# Patient Record
Sex: FEMALE | Race: WHITE | NOT HISPANIC OR LATINO | Employment: OTHER | URBAN - METROPOLITAN AREA
[De-identification: names, ages, dates, MRNs, and addresses within clinical notes are randomized per-mention and may not be internally consistent; named-entity substitution may affect disease eponyms.]

---

## 2017-03-17 DIAGNOSIS — M54.50 CHRONIC LOW BACK PAIN WITHOUT SCIATICA, UNSPECIFIED BACK PAIN LATERALITY: ICD-10-CM

## 2017-03-17 DIAGNOSIS — G89.29 CHRONIC LOW BACK PAIN WITHOUT SCIATICA, UNSPECIFIED BACK PAIN LATERALITY: ICD-10-CM

## 2017-03-17 RX ORDER — HYDROCODONE BITARTRATE AND ACETAMINOPHEN 10; 325 MG/1; MG/1
TABLET ORAL
Qty: 40 TABLET | Refills: 0 | Status: SHIPPED | OUTPATIENT
Start: 2017-03-17 | End: 2017-06-12

## 2017-03-17 NOTE — TELEPHONE ENCOUNTER
Norco 10-325mg        Last Written Prescription Date: 12/26/16  Last Fill Quantity: 40,  # refills: 0  Last Office Visit with G, P or [facility] prescribing provider: 03/10/16    Kena Mccarty Pharmacy Technician  [facility]

## 2017-03-20 ENCOUNTER — TELEPHONE (OUTPATIENT)
Dept: FAMILY MEDICINE | Facility: CLINIC | Age: 57
End: 2017-03-20

## 2017-03-20 NOTE — LETTER
[employer]  [id number] [place] [address]  [address]  Phone: [phone]  Fax: [phone]            March 20,2017                Dear ,    This is to certify that you are being evaluated and treated for some left hip discomfort. This appears to be soft tissue related, and it may be that massages will give relief. You should be able to tap into your health care savings account for therapeutic massage every 1-2 weeks as needed.            Sincerely,     Minnie Montano MD

## 2017-03-20 NOTE — TELEPHONE ENCOUNTER
Reason for Call:  Other Letter    Detailed comments: Pt asking for a letter about massage therapy and using her HSA - She wants the same note that was given to her, by Dr. Montano, 03/25/15.  Please release the letter to pt's My Chart.    Phone Number Patient can be reached at: Home number on file [phone] (home)    Best Time: any    Can we leave a detailed message on this number? YES    Call taken on 3/20/2017 at 9:40 AM by Gracie Torres

## 2017-05-04 ENCOUNTER — RADIANT APPOINTMENT (OUTPATIENT)
Dept: GENERAL RADIOLOGY | Facility: CLINIC | Age: 57
End: 2017-05-04
Attending: FAMILY MEDICINE
Payer: COMMERCIAL

## 2017-05-04 ENCOUNTER — OFFICE VISIT (OUTPATIENT)
Dept: FAMILY MEDICINE | Facility: CLINIC | Age: 57
End: 2017-05-04
Payer: COMMERCIAL

## 2017-05-04 VITALS
SYSTOLIC BLOOD PRESSURE: 126 MMHG | HEART RATE: 84 BPM | BODY MASS INDEX: 36.32 KG/M2 | DIASTOLIC BLOOD PRESSURE: 85 MMHG | WEIGHT: 230.2 LBS

## 2017-05-04 DIAGNOSIS — M25.562 ACUTE PAIN OF LEFT KNEE: ICD-10-CM

## 2017-05-04 DIAGNOSIS — E66.09 NON MORBID OBESITY DUE TO EXCESS CALORIES: ICD-10-CM

## 2017-05-04 DIAGNOSIS — L30.8 OTHER ECZEMA: Primary | ICD-10-CM

## 2017-05-04 DIAGNOSIS — N39.46 MIXED STRESS AND URGE URINARY INCONTINENCE: ICD-10-CM

## 2017-05-04 PROCEDURE — 73565 X-RAY EXAM OF KNEES: CPT

## 2017-05-04 PROCEDURE — 99214 OFFICE O/P EST MOD 30 MIN: CPT | Performed by: FAMILY MEDICINE

## 2017-05-04 RX ORDER — PHENTERMINE HYDROCHLORIDE 37.5 MG/1
CAPSULE ORAL
Qty: 30 CAPSULE | Refills: 0 | Status: SHIPPED | OUTPATIENT
Start: 2017-05-04 | End: 2017-08-10

## 2017-05-04 RX ORDER — TOLTERODINE 2 MG/1
2 CAPSULE, EXTENDED RELEASE ORAL DAILY
Qty: 30 CAPSULE | Refills: 11 | Status: SHIPPED | OUTPATIENT
Start: 2017-05-04 | End: 2018-08-20

## 2017-05-04 NOTE — PROGRESS NOTES
"  SUBJECTIVE:                                                    Joana Arredondo is a 56 year old female who presents to clinic today for the following health issues:    Chief Complaint   Patient presents with     Knee Pain     left knee pain for the last couple months. Worse with activity. swelling on the top of knee. pain is localized in the front of her knee.      Mole     back (left side) not painful.      Derm Problem     discuss cream for excema, refill Kenalog cream.      Recheck Medication     refill meds     Weight Loss     discuss weight loss medications. She has tried Phentermine in the past but would like to discuss more options.      Joana Arredondo is a 56 year old female who has multiple concerns as noted above.  She has some old triamcinolone cream which she has used for some recurrent elbow dermatitis.  She is not finding it as effective as it was several years ago, and does not think that is due to the age of the cream. She uses it intermittently, would like to try something a bit stronger.    She has noted some periodic left knee discomfort, especially after walking long distances, sometimes notes some swelling on the anterior superior portion of the knee, sometimes feels fullness in the popliteal space. She has not noted redness or warmth, has not had any trauma, is not having pain at the present time. She denies locking or buckling. She has increased her activity as part of her weight loss efforts, has even ordered a FitBit.    She has lost weight in the past with [employer] and is restarting the weekly meetings, but would like something to \"jumpstart' her progress.  She has used phentermine in the past with good results, knows this cannot be used longterm, would like to discuss other medical treatment options.    She needs a refill of her Detrol LA.    OBJECTIVE: /85 (BP Location: Right arm, Patient Position: Chair, Cuff Size: Adult Regular)  Pulse 84  Wt 230 lb 3.2 oz " (104.4 kg)  LMP 03/06/2011  BMI 36.32 kg/m2    The lesion on her back is a 4-5 mm rough speckled tan lesion consistent with a small seborrheic keratosis. Patient was reassured.    Knees are grossly symmetric, no effusion or inflammation, normal range of motion.  Xray:  FINDINGS: No fracture or osseous lesion is seen. There is mild joint  space loss in the medial compartment of the left knee and of the  lateral compartment of the right knee. No other abnormality is  Demonstrated.    Examination of the elbows continues to show rough red lesions over each olecranon. On one arm, she also has a 3x2 cm slightly raised red smooth lesion on the distal extensor surface of the forearm with distinct borders suggestive by size and location of a possible psoriatic lesion, but there is no scaliness noted at this time. She has no other similar lesions.    ASSESSMENT:/PLAN:  1)seborrheic keratosis -- asymptomatic, no treatment needed  2) mild osteoarthritis left knee -- agreed with plan to work on weight loss. Continue regular exercise but avoid high impact exercise. OK for occasional Tylenol if needed.  3) eczematoid lesions on elbow with newer plaque suggestive but not diagnostic of possible psoriatic lesion -- Rx Lidex-E BID as needed  4) We discussed phentermine, Orlistat, Belviq, Topamax, Contrave, and Qsymia. We did not discuss metformin or Victoza. At this time, since she did well on phentermine in the past --- and did very well many years ago on the old Phen-Fen combination -- and since phentermine is the least expensive, she would like to try this again for 2-4 months while she gets started on the [employer] again.  Rx for phentermine 18.75 mg daily for two months, then reassess.  5) mixed stress and urge incontinence -- Detrol LA renewed for one year.    Minnie Montano md

## 2017-05-04 NOTE — NURSING NOTE
"Chief Complaint   Patient presents with     Knee Pain     left knee pain for the last couple months. Worse with activity. swelling on the top of knee. pain is localized in the front of her knee.      Mole     back (left side) not painful.      Derm Problem     discuss cream for excema, refill Kenalog cream.      Recheck Medication     refill meds     Weight Loss     discuss weight loss medications. She has tried Phentermine in the past but would like to discuss more options.        Initial /85 (BP Location: Right arm, Patient Position: Chair, Cuff Size: Adult Regular)  Pulse 84  Wt 230 lb 3.2 oz (104.4 kg)  LMP 03/06/2011  BMI 36.32 kg/m2 Estimated body mass index is 36.32 kg/(m^2) as calculated from the following:    Height as of 3/10/16: 5' 6.75\" (1.695 m).    Weight as of this encounter: 230 lb 3.2 oz (104.4 kg).  Medication Reconciliation: complete   Indu Chaparro CMA    "

## 2017-05-04 NOTE — MR AVS SNAPSHOT
After Visit Summary   5/4/2017    Joana Arredondo    MRN: [id number]           Patient Information     Date Of Birth          1960        Visit Information        Provider Department      5/4/2017 4:00 PM Minnie Montano MD [facility]        Today's Diagnoses     Other eczema    -  1    Mixed stress and urge urinary incontinence        Acute pain of left knee        Non morbid obesity due to excess calories           Follow-ups after your visit        Who to contact     If you have questions or need follow up information about today's clinic visit or your schedule please contact [facility] directly at [phone].  Normal or non-critical lab and imaging results will be communicated to you by Reviews42hart, letter or phone within 4 business days after the clinic has received the results. If you do not hear from us within 7 days, please contact the clinic through Reviews42hart or phone. If you have a critical or abnormal lab result, we will notify you by phone as soon as possible.  Submit refill requests through DataFlyte or call your pharmacy and they will forward the refill request to us. Please allow 3 business days for your refill to be completed.          Additional Information About Your Visit        MyChart Information     DataFlyte gives you secure access to your electronic health record. If you see a primary care provider, you can also send messages to your care team and make appointments. If you have questions, please call your primary care clinic.  If you do not have a primary care provider, please call [phone] and they will assist you.        Care EveryWhere ID     This is your Care EveryWhere ID. This could be used by other organizations to access your [place] medical records  [id number]        Your Vitals Were     Pulse Last Period BMI (Body Mass Index)             84 03/06/2011 36.32 kg/m2          Blood Pressure from Last 3 Encounters:   05/04/17  126/85   03/10/16 123/81   01/22/16 122/70    Weight from Last 3 Encounters:   05/04/17 230 lb 3.2 oz (104.4 kg)   03/10/16 231 lb (104.8 kg)   01/22/16 231 lb (104.8 kg)                 Today's Medication Changes          These changes are accurate as of: 5/4/17  5:48 PM.  If you have any questions, ask your nurse or doctor.               Start taking these medicines.        Dose/Directions    fluocinonide-emollient 0.05 % cream   Commonly known as:  LIDEX-E   Used for:  Other eczema   Replaces:  triamcinolone 0.1 % cream   Started by:  Minnie Montano MD        Apply to affected area of elbows twice daily as needed.   Quantity:  30 g   Refills:  3       phentermine 37.5 MG capsule   Used for:  Non morbid obesity due to excess calories   Started by:  Minnie Montano MD        Take 1/2 tablet daily in the AM.   Quantity:  30 capsule   Refills:  0         Stop taking these medicines if you haven't already. Please contact your care team if you have questions.     triamcinolone 0.1 % cream   Commonly known as:  KENALOG   Replaced by:  fluocinonide-emollient 0.05 % cream   Stopped by:  Minnie Montano MD                Where to get your medicines      These medications were sent to [facility] [address]  [address] [facility] [address]     Phone:  [phone]     fluocinonide-emollient 0.05 % cream    tolterodine 2 MG 24 hr capsule         Some of these will need a paper prescription and others can be bought over the counter.  Ask your nurse if you have questions.     Bring a paper prescription for each of these medications     phentermine 37.5 MG capsule                Primary Care Provider Office Phone # Fax #    Minnie Montano [phone]       [facility] [address] [facility] [address]        Thank you!     Thank you for choosing [facility]  for your care. Our goal is  always to provide you with excellent care. Hearing back from our patients is one way we can continue to improve our services. Please take a few minutes to complete the written survey that you may receive in the mail after your visit with us. Thank you!             Your Updated Medication List - Protect others around you: Learn how to safely use, store and throw away your medicines at www.disposemymeds.org.          This list is accurate as of: 5/4/17  5:48 PM.  Always use your most recent med list.                   Brand Name Dispense Instructions for use    acyclovir 5 % cream    ZOVIRAX    5 g    Apply  topically. Apply to affected area thinly five times daily at the first sign of symptoms.       CALCIUM + D PO      1 TABLET DAILY       fluocinonide-emollient 0.05 % cream    LIDEX-E    30 g    Apply to affected area of elbows twice daily as needed.       HYDROcodone-acetaminophen  MG per tablet    NORCO    40 tablet    Take 1/2 - 1 tablet at night as needed for back pain.       LORazepam 0.5 MG tablet    ATIVAN    20 tablet    Take 1-2 tablets (0.5-1 mg) by mouth every 8 hours as needed for anxiety       MULTIVITAMINS PO      1 daily       phentermine 37.5 MG capsule     30 capsule    Take 1/2 tablet daily in the AM.       tolterodine 2 MG 24 hr capsule    DETROL LA    30 capsule    Take 1 capsule (2 mg) by mouth daily       TYLENOL ARTHRITIS PAIN 650 MG CR tablet   Generic drug:  acetaminophen      Take 650 mg by mouth daily. 2 daily

## 2017-05-05 ASSESSMENT — ANXIETY QUESTIONNAIRES
GAD7 TOTAL SCORE: 0
7. FEELING AFRAID AS IF SOMETHING AWFUL MIGHT HAPPEN: NOT AT ALL
2. NOT BEING ABLE TO STOP OR CONTROL WORRYING: NOT AT ALL
IF YOU CHECKED OFF ANY PROBLEMS ON THIS QUESTIONNAIRE, HOW DIFFICULT HAVE THESE PROBLEMS MADE IT FOR YOU TO DO YOUR WORK, TAKE CARE OF THINGS AT HOME, OR GET ALONG WITH OTHER PEOPLE: NOT DIFFICULT AT ALL
1. FEELING NERVOUS, ANXIOUS, OR ON EDGE: NOT AT ALL
6. BECOMING EASILY ANNOYED OR IRRITABLE: NOT AT ALL
3. WORRYING TOO MUCH ABOUT DIFFERENT THINGS: NOT AT ALL
5. BEING SO RESTLESS THAT IT IS HARD TO SIT STILL: NOT AT ALL

## 2017-05-05 ASSESSMENT — PATIENT HEALTH QUESTIONNAIRE - PHQ9: 5. POOR APPETITE OR OVEREATING: NOT AT ALL

## 2017-05-06 ASSESSMENT — PATIENT HEALTH QUESTIONNAIRE - PHQ9: SUM OF ALL RESPONSES TO PHQ QUESTIONS 1-9: 3

## 2017-05-06 ASSESSMENT — ANXIETY QUESTIONNAIRES: GAD7 TOTAL SCORE: 0

## 2017-06-12 DIAGNOSIS — M54.50 CHRONIC LOW BACK PAIN WITHOUT SCIATICA, UNSPECIFIED BACK PAIN LATERALITY: ICD-10-CM

## 2017-06-12 DIAGNOSIS — G89.29 CHRONIC LOW BACK PAIN WITHOUT SCIATICA, UNSPECIFIED BACK PAIN LATERALITY: ICD-10-CM

## 2017-06-12 RX ORDER — HYDROCODONE BITARTRATE AND ACETAMINOPHEN 10; 325 MG/1; MG/1
TABLET ORAL
Qty: 40 TABLET | Refills: 0 | Status: SHIPPED | OUTPATIENT
Start: 2017-06-12 | End: 2017-08-10

## 2017-06-12 NOTE — TELEPHONE ENCOUNTER
Norco 10-325mg      Last Written Prescription Date:  3/17/17  Last Fill Quantity: 40,   # refills: 0  Last Office Visit with [facility], [facility] or [facility] prescribing provider: 5/4/17  Future Office visit:       Routing refill request to provider for review/approval because:  Drug not on the [facility], [facility] or [facility] refill protocol or controlled substance    Thank You-  Olga Vergara, [facility] [place]

## 2017-08-10 DIAGNOSIS — F41.9 ANXIETY: ICD-10-CM

## 2017-08-10 DIAGNOSIS — E66.09 NON MORBID OBESITY DUE TO EXCESS CALORIES: ICD-10-CM

## 2017-08-10 DIAGNOSIS — M54.50 CHRONIC LOW BACK PAIN WITHOUT SCIATICA, UNSPECIFIED BACK PAIN LATERALITY: ICD-10-CM

## 2017-08-10 DIAGNOSIS — G89.29 CHRONIC LOW BACK PAIN WITHOUT SCIATICA, UNSPECIFIED BACK PAIN LATERALITY: ICD-10-CM

## 2017-08-10 RX ORDER — PHENTERMINE HYDROCHLORIDE 37.5 MG/1
CAPSULE ORAL
Qty: 30 CAPSULE | Refills: 0 | Status: SHIPPED | OUTPATIENT
Start: 2017-08-10 | End: 2018-03-07

## 2017-08-10 RX ORDER — LORAZEPAM 0.5 MG/1
.5-1 TABLET ORAL EVERY 8 HOURS PRN
Qty: 20 TABLET | Refills: 1 | Status: SHIPPED | OUTPATIENT
Start: 2017-08-10 | End: 2018-09-14

## 2017-08-10 RX ORDER — HYDROCODONE BITARTRATE AND ACETAMINOPHEN 10; 325 MG/1; MG/1
TABLET ORAL
Qty: 40 TABLET | Refills: 0 | Status: SHIPPED | OUTPATIENT
Start: 2017-08-10 | End: 2018-01-02

## 2017-08-10 NOTE — TELEPHONE ENCOUNTER
Norco   Last Written Prescription Date: 06/12/17  Last Fill Quantity: 40,  # refills: 0   Last Office Visit with [facility], [facility] or [facility] prescribing provider: 05/04/17    Phentermine     Last Written Prescription Date: 05/04  Last Fill Quantity:30 ,  # refills: 0  Last Office Visit with [facility], [facility] or [facility] prescribing provider: 05/04/17    Lorazepam    Last Written Prescription Date: 12/26/16  Last Fill Quantity: 20,  # refills: 1   Last Office Visit with [facility], [facility] or [facility] prescribing provider: 05/04/17    [facility]  [phone]

## 2018-01-02 ENCOUNTER — OFFICE VISIT (OUTPATIENT)
Dept: FAMILY MEDICINE | Facility: CLINIC | Age: 58
End: 2018-01-02
Payer: COMMERCIAL

## 2018-01-02 VITALS
WEIGHT: 234 LBS | RESPIRATION RATE: 18 BRPM | SYSTOLIC BLOOD PRESSURE: 116 MMHG | BODY MASS INDEX: 36.92 KG/M2 | DIASTOLIC BLOOD PRESSURE: 78 MMHG | TEMPERATURE: 98.3 F | HEART RATE: 87 BPM

## 2018-01-02 DIAGNOSIS — M54.50 CHRONIC LOW BACK PAIN WITHOUT SCIATICA, UNSPECIFIED BACK PAIN LATERALITY: ICD-10-CM

## 2018-01-02 DIAGNOSIS — G89.29 CHRONIC LOW BACK PAIN WITHOUT SCIATICA, UNSPECIFIED BACK PAIN LATERALITY: ICD-10-CM

## 2018-01-02 DIAGNOSIS — L23.9 ALLERGIC CONTACT DERMATITIS, UNSPECIFIED TRIGGER: Primary | ICD-10-CM

## 2018-01-02 PROCEDURE — 99213 OFFICE O/P EST LOW 20 MIN: CPT | Performed by: FAMILY MEDICINE

## 2018-01-02 RX ORDER — HYDROCODONE BITARTRATE AND ACETAMINOPHEN 10; 325 MG/1; MG/1
TABLET ORAL
Qty: 40 TABLET | Refills: 0 | Status: SHIPPED | OUTPATIENT
Start: 2018-01-02 | End: 2018-04-12

## 2018-01-02 ASSESSMENT — PATIENT HEALTH QUESTIONNAIRE - PHQ9: SUM OF ALL RESPONSES TO PHQ QUESTIONS 1-9: 0

## 2018-01-02 NOTE — MR AVS SNAPSHOT
After Visit Summary   1/2/2018    Joana Arredondo    MRN: [id number]           Patient Information     Date Of Birth          1960        Visit Information        Provider Department      1/2/2018 2:00 PM Conor Escoto MD [facility]        Today's Diagnoses     Allergic contact dermatitis, unspecified trigger    -  1    Chronic low back pain without sciatica, unspecified back pain laterality          Care Instructions          Thank you for choosing [facility].  You may be receiving a survey in the mail from [facility] your visit today.  Please take a few minutes to complete and return the survey to let us know how we are doing.      Our Clinic hours are:  Mondays    7:20 am - 7 pm  Tues -  Fri  7:20 am - 5 pm    Clinic Phone: [phone]    The clinic lab opens at 7:30 am Mon - Fri and appointments are required.    [facility]. [phone]  Monday-Thursday 8 am - 7pm  Tues/Wed/Fri 8 am - 5:30 pm                 Follow-ups after your visit        Your next 10 appointments already scheduled     Jan 08, 2018 10:15 AM CST   New Visit [facility]as MD   [facility]    [address] [facility] [address] [phone]              Who to contact     If you have questions or need follow up information about today's clinic visit or your schedule please contact [facility] directly at [phone].  Normal or non-critical lab and imaging results will be communicated to you by MyChart, letter or phone within 4 business days after the clinic has received the results. If you do not hear from us within 7 days, please contact the clinic through MyChart or phone. If you have a critical or abnormal lab result, we will notify you by phone as soon as possible.  Submit refill requests through Siemens or call your pharmacy and they will forward the refill request to  us. Please allow 3 business days for your refill to be completed.          Additional Information About Your Visit        MyChart Information     SoundTag gives you secure access to your electronic health record. If you see a primary care provider, you can also send messages to your care team and make appointments. If you have questions, please call your primary care clinic.  If you do not have a primary care provider, please call [phone] and they will assist you.        Care EveryWhere ID     This is your Care EveryWhere ID. This could be used by other organizations to access your [place] medical records  [id number]        Your Vitals Were     Pulse Temperature Respirations Last Period BMI (Body Mass Index)       87 98.3  F (36.8  C) 18 03/06/2011 36.92 kg/m2        Blood Pressure from Last 3 Encounters:   01/02/18 116/78   05/04/17 126/85   03/10/16 123/81    Weight from Last 3 Encounters:   01/02/18 234 lb (106.1 kg)   05/04/17 230 lb 3.2 oz (104.4 kg)   03/10/16 231 lb (104.8 kg)              We Performed the Following     DEPRESSION ACTION PLAN (DAP)          Where to get your medicines      Some of these will need a paper prescription and others can be bought over the counter.  Ask your nurse if you have questions.     Bring a paper prescription for each of these medications     HYDROcodone-acetaminophen  MG per tablet          Primary Care Provider Fax #    Physician No Ref-Primary [phone]       No address on file        Equal Access to Services     NIRAJ WAYNE : Hadii demi pererao Sonika, waaxda luqadaha, qaybta kaalmada adeegyada, samuel kamara . So [facility] [phone].    ATENCIÓN: Si habla español, tiene a flanagan disposición servicios gratuitos de asistencia lingüística. Llame al [phone].    We comply with applicable federal civil rights laws and [place] laws. We do not discriminate on the basis of race, color, national origin, age, disability, sex,  sexual orientation, or gender identity.            Thank you!     Thank you for choosing [facility]  for your care. Our goal is always to provide you with excellent care. Hearing back from our patients is one way we can continue to improve our services. Please take a few minutes to complete the written survey that you may receive in the mail after your visit with us. Thank you!             Your Updated Medication List - Protect others around you: Learn how to safely use, store and throw away your medicines at www.disposemymeds.org.          This list is accurate as of: 1/2/18  2:21 PM.  Always use your most recent med list.                   Brand Name Dispense Instructions for use Diagnosis    acyclovir 5 % cream    ZOVIRAX    5 g    Apply  topically. Apply to affected area thinly five times daily at the first sign of symptoms.    Herpes simplex without mention of complication       CALCIUM + D PO      1 TABLET DAILY        fluocinonide-emollient 0.05 % cream    LIDEX-E    30 g    Apply to affected area of elbows twice daily as needed.    Other eczema       FLUVIRIN Susp   Generic drug:  Influenza Vac Typ A&B Surf Ant      ADM 0.5ML IM UTD        HYDROcodone-acetaminophen  MG per tablet    NORCO    40 tablet    Take 1/2 - 1 tablet at night as needed for back pain.    Chronic low back pain without sciatica, unspecified back pain laterality       LORazepam 0.5 MG tablet    ATIVAN    20 tablet    Take 1-2 tablets (0.5-1 mg) by mouth every 8 hours as needed for anxiety    Anxiety       MULTIVITAMINS PO      1 daily        phentermine 37.5 MG capsule     30 capsule    Take 1/2 tablet daily in the AM.    Non morbid obesity due to excess calories       tolterodine 2 MG 24 hr capsule    DETROL LA    30 capsule    Take 1 capsule (2 mg) by mouth daily    Mixed stress and urge urinary incontinence       TYLENOL ARTHRITIS PAIN 650 MG CR tablet   Generic drug:  acetaminophen      Take 650 mg by mouth  daily. 2 daily    Chronic low back pain

## 2018-01-02 NOTE — PROGRESS NOTES
SUBJECTIVE:   Joana Arredondo is a 57 year old female who presents to clinic today for the following health issues:    Chief Complaint   Patient presents with     Derm Problem     rash on both eye lids      Letter Request     Recheck Medication     refill pain medication          Rash  Onset: x 6 days     Description:   Location:  Both  eye lids   Character: red  Itching (Pruritis): YES    Progression of Symptoms:  worsening    Accompanying Signs & Symptoms:  Fever: no   Body aches or joint pain: no   Sore throat symptoms: no   Recent cold symptoms: no     History:   Previous similar rash: no     Precipitating factors:   Exposure to similar rash: no   New exposures: None   Recent travel: no     Alleviating factors:  none    Therapies Tried and outcome: OTC creams - no relief           Problem list and histories reviewed & adjusted, as indicated.  Additional history: as documented        Reviewed and updated as needed this visit by clinical staffTobacco  Meds       Reviewed and updated as needed this visit by Provider      Further history obtained, clarified or corrected by physician:  She has had a few days of itchy rash on the upper eyelids.  She also apparently is a long-term but very intermittent Vicodin use her for chronic low back pain and she wants a refill.    OBJECTIVE:  /78  Pulse 87  Temp 98.3  F (36.8  C)  Resp 18  Wt 234 lb (106.1 kg)  LMP 03/06/2011  BMI 36.92 kg/m2  LUNGS: clear to auscultation, normal breath sounds  CV: RRR without murmur  ABD: BS+, soft, nontender, no masses, no hepatosplenomegaly  EXTREMITIES: without joint tenderness, swelling or erythema.  No muscle tenderness or abnormality.  SKIN: No rashes or abnormalities, except for just slight redness and scaling of the upper lids  NEURO:non focal exam    ASSESSMENT:     Chronic low back pain without sciatica, unspecified back pain laterality  Allergic contact dermatitis, unspecified trigger    PLAN:  Over-the-counter  hydrocortisone cream twice daily as needed for the next week or so  She has an appointment with a dermatologist next week so she can follow-up on the rash at that time.    Orders Placed This Encounter     DEPRESSION ACTION PLAN (DAP)     HYDROcodone-acetaminophen (NORCO)  MG per tablet     FLUVIRIN SUSP

## 2018-01-02 NOTE — PATIENT INSTRUCTIONS
Thank you for choosing [facility].  You may be receiving a survey in the mail from [facility] regarding your visit today.  Please take a few minutes to complete and return the survey to let us know how we are doing.      Our Clinic hours are:  Mondays    7:20 am - 7 pm  Tues -  Fri  7:20 am - 5 pm    Clinic Phone: [phone]    The clinic lab opens at 7:30 am Mon - Fri and appointments are required.    [facility]. [phone]  Monday-Thursday 8 am - 7pm  Tues/Wed/Fri 8 am - 5:30 pm

## 2018-01-02 NOTE — LETTER
[facility]  [address] [place] [address]  [address]  Phone: [phone]  Fax: [phone]            Joana Arredondo  [address] [address]  Phone: [phone]            Joana Arredondo,      Dear ,     This is to certify that you are being evaluated and treated for some left hip discomfort. This appears to be soft tissue related, and it may be that massages will give relief. You should be able to tap into your health care savings account for therapeutic massage every 1-2 weeks as needed.            Dr. Escoto

## 2018-01-08 ENCOUNTER — OFFICE VISIT (OUTPATIENT)
Dept: DERMATOLOGY | Facility: CLINIC | Age: 58
End: 2018-01-08
Payer: COMMERCIAL

## 2018-01-08 ENCOUNTER — TELEPHONE (OUTPATIENT)
Dept: DERMATOLOGY | Facility: CLINIC | Age: 58
End: 2018-01-08

## 2018-01-08 VITALS — HEART RATE: 82 BPM | SYSTOLIC BLOOD PRESSURE: 115 MMHG | DIASTOLIC BLOOD PRESSURE: 70 MMHG | OXYGEN SATURATION: 99 %

## 2018-01-08 DIAGNOSIS — L82.1 SK (SEBORRHEIC KERATOSIS): ICD-10-CM

## 2018-01-08 DIAGNOSIS — C44.311 BASAL CELL CARCINOMA OF NOSE: Primary | ICD-10-CM

## 2018-01-08 DIAGNOSIS — L81.4 LENTIGO: ICD-10-CM

## 2018-01-08 PROCEDURE — 99203 OFFICE O/P NEW LOW 30 MIN: CPT | Mod: 25 | Performed by: DERMATOLOGY

## 2018-01-08 PROCEDURE — 88331 PATH CONSLTJ SURG 1 BLK 1SPC: CPT | Performed by: DERMATOLOGY

## 2018-01-08 PROCEDURE — 11100 HC BIOPSY SKIN/SUBQ/MUC MEM, SINGLE LESION: CPT | Performed by: DERMATOLOGY

## 2018-01-08 NOTE — LETTER
1/8/2018         RE: Joana Arredondo  [address] [place] [address]        Dear Colleague,    Thank you for referring your patient, Joana Arredondo, to the [facility]. Please see a copy of my visit note below.    Joana Arredondo is a 57 year old year old female patient here today for bleeding spot on nose .  Patient states this has been present for 3 months.  Patient reports the following symptoms:  bleeding.  Patient reports the following previous treatments none.  Patient reports the following modifying factors none.  Associated symptoms: none.  Patient has no other skin complaints today.  Remainder of the HPI, Meds, PMH, Allergies, FH, and SH was reviewed in chart.      Past Medical History:   Diagnosis Date     Neoplasm of uncertain behavior of skin 3/13/2012     Tobacco use disorder 11/30/2005    Quit October 2007       Past Surgical History:   Procedure Laterality Date     COLONOSCOPY  10/22/2012    Procedure: COLONOSCOPY;  Colonoscopy;  Surgeon: Robert Encinas MD;  Location: [facility]     ENT SURGERY       LAPAROSCOPIC CHOLECYSTECTOMY  10/21/2010    LAPAROSCOPIC CHOLECYSTECTOMY performed by DAMIR WING at [facility]     SURGICAL HISTORY OF -   1973    Rt wrist surgery     SURGICAL HISTORY OF -   2004    breast augmentation     SURGICAL HISTORY OF -   10/15/2010    EUS - GB bulding w/sludge, Nl bile duct w/no choledocholithiasis or sludge.      SURGICAL HISTORY OF -   2/9/2006    1.  Left shoulder arthroscopy with arthroscopic subacromial decompression.      TONSILLECTOMY & ADENOIDECTOMY  as child    tonsils and adenoids        Family History   Problem Relation Age of Onset     DIABETES Father      type 2     C.A.D. Father      HEART DISEASE Father      had 2 heart attacks,68 for first one 79 on second     Cancer - colorectal Paternal Grandfather      DIABETES Sister      type 2       Social History     Social History     Marital status:      Spouse name: N/A      Number of children: N/A     Years of education: N/A     Occupational History     Not on file.     Social History Main Topics     Smoking status: Current Every Day Smoker     Packs/day: 0.50     Years: 20.00     Types: Cigarettes     Smokeless tobacco: Never Used      Comment: using E-cig     Alcohol use No     Drug use: No     Sexual activity: Yes     Partners: Male      Comment: husbandvasectomy     Other Topics Concern     Parent/Sibling W/ Cabg, Mi Or Angioplasty Before 65f 55m? No     Social History Narrative       Outpatient Encounter Prescriptions as of 1/8/2018   Medication Sig Dispense Refill     HYDROcodone-acetaminophen (NORCO)  MG per tablet Take 1/2 - 1 tablet at night as needed for back pain. 40 tablet 0     FLUVIRIN SUSP ADM 0.5ML IM UTD  0     phentermine 37.5 MG capsule Take 1/2 tablet daily in the AM. 30 capsule 0     LORazepam (ATIVAN) 0.5 MG tablet Take 1-2 tablets (0.5-1 mg) by mouth every 8 hours as needed for anxiety 20 tablet 1     fluocinonide-emollient (LIDEX-E) 0.05 % cream Apply to affected area of elbows twice daily as needed. 30 g 3     tolterodine (DETROL LA) 2 MG 24 hr capsule Take 1 capsule (2 mg) by mouth daily 30 capsule 11     acetaminophen (TYLENOL ARTHRITIS PAIN) 650 MG CR tablet Take 650 mg by mouth daily. 2 daily       acyclovir (ZOVIRAX) 5 % cream Apply  topically. Apply to affected area thinly five times daily at the first sign of symptoms. 5 g 2     MULTIVITAMINS OR 1 daily        CALCIUM + D OR 1 TABLET DAILY       No facility-administered encounter medications on file as of 1/8/2018.              Review Of Systems  Skin: As above  Eyes: negative  Ears/Nose/Throat: negative  Respiratory: No shortness of breath, dyspnea on exertion, cough, or hemoptysis  Cardiovascular: negative  Gastrointestinal: negative  Genitourinary: negative  Musculoskeletal: negative  Neurologic: negative  Psychiatric: negative  Hematologic/Lymphatic/Immunologic: negative  Endocrine:  negative      O:   NAD, WDWN, Alert & Oriented, Mood & Affect wnl, Vitals stable   Here today alone   /70 (BP Location: Left arm, Patient Position: Sitting, Cuff Size: Adult Large)  Pulse 82  LMP 03/06/2011  SpO2 99%   General appearance normal   Vitals stable   Alert, oriented and in no acute distress      Following lymph nodes palpated: Occipital, Cervical, Supraclavicular no lad   Stuck on papules and brown macules on trunk and ext    L nasal tip 5mm pink pearly papule           The remainder of expanded problem focused exam was unremarkable; the following areas were examined:  scalp/hair, conjunctiva/lids, face, neck, lips, chest, digits/nails, RUE, LUE.      Eyes: Conjunctivae/lids:Normal     ENT: Lips, buccal mucosa, tongue: normal    MSK:Normal    Cardiovascular: peripheral edema none    Pulm: Breathing Normal    Lymph Nodes: No Head and Neck Lymphadenopathy     Neuro/Psych: Orientation:Normal; Mood/Affect:Normal    MICRO  L nasal tip:Orthokeratosis of epidermis with a proliferation of nests of basaloid cells, with peripheral palisading and a haphazard arrangement in the center extending into the dermis, forming nodules.  The tumor cells have hyperchromatic nuclei. Poor cytoplasm and intercellular bridging.    A/P:  1. L nasal tip r/o basal cell carcinoma   TANGENTIAL BIOPSY IN HOUSE:  After consent, anesthesia with LEC and prep, tangential excision performed and dx above confirmed with frozen section histology.  No complications and routine wound care.  Patient told result basal cell carcinoma schedule  2. Seborrheic keratosis, lentigo  .    BENIGN LESIONS DISCUSSED WITH PATIENT:  I discussed the specifics of tumor, prognosis, and genetics of benign lesions.  I explained that treatment of these lesions would be purely cosmetic and not medically neccessary.  I discussed with patient different removal options including excision, cautery and /or laser.      Nature and genetics of benign skin lesions  dicussed with patient.  Signs and Symptoms of skin cancer discussed with patient.  Patient encouraged to perform monthly skin exams.  UV precautions reviewed with patient.  Skin care regimen reviewed with patient: Eliminate harsh soaps, i.e. Dial, zest, irsih spring; Mild soaps such as Cetaphil or Dove sensitive skin, avoid hot or cold showers, aggressive use of emollients including vanicream, cetaphil or cerave discussed with patient.    Risks of non-melanoma skin cancer discussed with patient   Return to clinic 6 months        Again, thank you for allowing me to participate in the care of your patient.        Sincerely,        Tato Dias MD

## 2018-01-08 NOTE — LETTER
[facility]  [address]  [facility] [address]  Phone: [phone]    January 8, 2018    Joana Maria Elena                                                                                                                [address] [place] [address]            Dear Ms. Arredondo,    You are scheduled for Mohs Surgery on Monday January 22 nd at 7:30 am.     Please check in at Dermatology Clinic.   (2nd Floor, last  Clinic on right up staircase or elevator -past OB/GYN clinic)    You don't need to arrive more than 5-10 minutes prior to your appointment time.     Be sure to eat a good breakfast and bathe and wash your hair prior to Surgery.    If you are taking any anti-coagulants that are prescribed by your Doctor (such as Coumadin/warfarin, Plavix, Aspirin, Ibuprofen), please continue taking them.     However, If you are taking anti-coagulants over the counter without  a Doctor's order for a Medical condition, please discontinue them 10 days prior to Surgery.      Please wear loose comfortable clothing as it could possibly be 4-6 hours until your surgery is completed depending upon how many layers of tissue need to be removed.     Wi-fi access is available.     Thank you,      Tato Dias MD/ Virgie Peguero RN

## 2018-01-08 NOTE — TELEPHONE ENCOUNTER
Patient notified. Patient verbalized understanding. Scheduled for MOHS Surgery. Mohs brochure/Pre-op letter sent.   Virgie Peguero RN

## 2018-01-08 NOTE — NURSING NOTE
"Initial /70 (BP Location: Left arm, Patient Position: Sitting, Cuff Size: Adult Large)  Pulse 82  LMP 03/06/2011  SpO2 99% Estimated body mass index is 36.92 kg/(m^2) as calculated from the following:    Height as of 3/10/16: 1.695 m (5' 6.75\").    Weight as of 1/2/18: 106.1 kg (234 lb). .      "
Statement Selected

## 2018-01-08 NOTE — MR AVS SNAPSHOT
After Visit Summary   1/8/2018    Joana Arredondo    MRN: [id number]           Patient Information     Date Of Birth          1960        Visit Information        Provider Department      1/8/2018 10:15 AM Tato Dias MD [facility]        Care Instructions          Wound Care Instructions     FOR SUPERFICIAL WOUNDS     [facility] [phone]    [facility][1] [phone]      nose                 AFTER 24 HOURS YOU SHOULD REMOVE THE BANDAGE AND BEGIN DAILY DRESSING CHANGES AS FOLLOWS:     1) Remove Dressing.     2) Clean and dry the area with tap water using a Q-tip or sterile gauze pad.     3) Apply Vaseline, Aquaphor, Polysporin ointment or Bacitracin ointment over entire wound.  Do NOT use Neosporin ointment.     4) Cover the wound with a band-aid, or a sterile non-stick gauze pad and micropore paper tape      REPEAT THESE INSTRUCTIONS AT LEAST ONCE A DAY UNTIL THE WOUND HAS COMPLETELY HEALED.    It is an old wives tale that a wound heals better when it is exposed to air and allowed to dry out. The wound will heal faster with a better cosmetic result if it is kept moist with ointment and covered with a bandage.    **Do not let the wound dry out.**      Supplies Needed:      *Cotton tipped applicators (Q-tips)    *Polysporin Ointment or Bacitracin Ointment (NOT NEOSPORIN)    *Band-aids or non-stick gauze pads and micropore paper tape.      PATIENT INFORMATION:    During the healing process you will notice a number of changes. All wounds develop a small halo of redness surrounding the wound.  This means healing is occurring. Severe itching with extensive redness usually indicates sensitivity to the ointment or bandage tape used to dress the wound.  You should call our office if this develops.      Swelling  and/or discoloration around your surgical site is common, particularly when performed around the eye.    All wounds normally drain.  The  larger the wound the more drainage there will be.  After 7-10 days, you will notice the wound beginning to shrink and new skin will begin to grow.  The wound is healed when you can see skin has formed over the entire area.  A healed wound has a healthy, shiny look to the surface and is red to dark pink in color to normalize.  Wounds may take approximately 4-6 weeks to heal.  Larger wounds may take 6-8 weeks.  After the wound is healed you may discontinue dressing changes.    You may experience a sensation of tightness as your wound heals. This is normal and will gradually subside.    Your healed wound may be sensitive to temperature changes. This sensitivity improves with time, but if you re having a lot of discomfort, try to avoid temperature extremes.    Patients frequently experience itching after their wound appears to have healed because of the continue healing under the skin.  Plain Vaseline will help relieve the itching.        POSSIBLE COMPLICATIONS    BLEEDIN. Leave the bandage in place.  2. Use tightly rolled up gauze or a cloth to apply direct pressure over the bandage for 30  minutes.  3. Reapply pressure for an additional 30 minutes if necessary  4. Use additional gauze and tape to maintain pressure once the bleeding has stopped.      You can try a Benzoyl Peroxide wash on face      We will notify you of your biopsy results within 2 business days.           Follow-ups after your visit        Who to contact     If you have questions or need follow up information about today's clinic visit or your schedule please contact [facility] directly at [phone].  Normal or non-critical lab and imaging results will be communicated to you by MyChart, letter or phone within 4 business days after the clinic has received the results. If you do not hear from us within 7 days, please contact the clinic through MyChart or phone. If you have a critical or abnormal lab result, we will notify you by  phone as soon as possible.  Submit refill requests through [employer] or call your pharmacy and they will forward the refill request to us. Please allow 3 business days for your refill to be completed.          Additional Information About Your Visit        [employer] Information     [employer] gives you secure access to your electronic health record. If you see a primary care provider, you can also send messages to your care team and make appointments. If you have questions, please call your primary care clinic.  If you do not have a primary care provider, please call [phone] and they will assist you.        Care EveryWhere ID     This is your Care EveryWhere ID. This could be used by other organizations to access your [facility] medical records  [id number]        Your Vitals Were     Pulse Last Period Pulse Oximetry             82 03/06/2011 99%          Blood Pressure from Last 3 Encounters:   01/08/18 115/70   01/02/18 116/78   05/04/17 126/85    Weight from Last 3 Encounters:   01/02/18 106.1 kg (234 lb)   05/04/17 104.4 kg (230 lb 3.2 oz)   03/10/16 104.8 kg (231 lb)              Today, you had the following     No orders found for display       Primary Care Provider Fax #    Physician No Ref-Primary [phone]       No address on file        Equal Access to Services     NIRAJ WAYNE : Hadii demi pererao Soestefaniaali, waaxda luqadaha, qaybta kaalmada adeegyada, samuel kamara . So [facility] [phone].    ATENCIÓN: Si habla español, tiene a flanagan disposición servicios gratuitos de asistencia lingüística. LlEdkimo al [phone].    We comply with applicable federal civil rights laws and [place] laws. We do not discriminate on the basis of race, color, national origin, age, disability, sex, sexual orientation, or gender identity.            Thank you!     Thank you for choosing [facility]  for your care. Our goal is always to provide you with excellent care. Hearing back from our  patients is one way we can continue to improve our services. Please take a few minutes to complete the written survey that you may receive in the mail after your visit with us. Thank you!             Your Updated Medication List - Protect others around you: Learn how to safely use, store and throw away your medicines at www.disposemymeds.org.          This list is accurate as of: 1/8/18 10:31 AM.  Always use your most recent med list.                   Brand Name Dispense Instructions for use Diagnosis    acyclovir 5 % cream    ZOVIRAX    5 g    Apply  topically. Apply to affected area thinly five times daily at the first sign of symptoms.    Herpes simplex without mention of complication       CALCIUM + D PO      1 TABLET DAILY        fluocinonide-emollient 0.05 % cream    LIDEX-E    30 g    Apply to affected area of elbows twice daily as needed.    Other eczema       FLUVIRIN Susp   Generic drug:  Influenza Vac Typ A&B Surf Ant      ADM 0.5ML IM UTD        HYDROcodone-acetaminophen  MG per tablet    NORCO    40 tablet    Take 1/2 - 1 tablet at night as needed for back pain.    Chronic low back pain without sciatica, unspecified back pain laterality       LORazepam 0.5 MG tablet    ATIVAN    20 tablet    Take 1-2 tablets (0.5-1 mg) by mouth every 8 hours as needed for anxiety    Anxiety       MULTIVITAMINS PO      1 daily        phentermine 37.5 MG capsule     30 capsule    Take 1/2 tablet daily in the AM.    Non morbid obesity due to excess calories       tolterodine 2 MG 24 hr capsule    DETROL LA    30 capsule    Take 1 capsule (2 mg) by mouth daily    Mixed stress and urge urinary incontinence       TYLENOL ARTHRITIS PAIN 650 MG CR tablet   Generic drug:  acetaminophen      Take 650 mg by mouth daily. 2 daily    Chronic low back pain

## 2018-01-08 NOTE — TELEPHONE ENCOUNTER
----- Message from Tato Dias MD sent at 1/8/2018 10:47 AM CST -----  L nasal tip basal cell carcinoma schedule excision

## 2018-01-08 NOTE — PROGRESS NOTES
Joana Arredondo is a 57 year old year old female patient here today for bleeding spot on nose .  Patient states this has been present for 3 months.  Patient reports the following symptoms:  bleeding.  Patient reports the following previous treatments none.  Patient reports the following modifying factors none.  Associated symptoms: none.  Patient has no other skin complaints today.  Remainder of the HPI, Meds, PMH, Allergies, FH, and SH was reviewed in chart.      Past Medical History:   Diagnosis Date     Neoplasm of uncertain behavior of skin 3/13/2012     Tobacco use disorder 11/30/2005    Quit October 2007       Past Surgical History:   Procedure Laterality Date     COLONOSCOPY  10/22/2012    Procedure: COLONOSCOPY;  Colonoscopy;  Surgeon: Robert Encinas MD;  Location: [facility]     ENT SURGERY       LAPAROSCOPIC CHOLECYSTECTOMY  10/21/2010    LAPAROSCOPIC CHOLECYSTECTOMY performed by DAMIR WING at [facility]     SURGICAL HISTORY OF -   1973    Rt wrist surgery     SURGICAL HISTORY OF -   2004    breast augmentation     SURGICAL HISTORY OF -   10/15/2010    EUS - GB bulding w/sludge, Nl bile duct w/no choledocholithiasis or sludge.      SURGICAL HISTORY OF -   2/9/2006    1.  Left shoulder arthroscopy with arthroscopic subacromial decompression.      TONSILLECTOMY & ADENOIDECTOMY  as child    tonsils and adenoids        Family History   Problem Relation Age of Onset     DIABETES Father      type 2     C.A.D. Father      HEART DISEASE Father      had 2 heart attacks,68 for first one 79 on second     Cancer - colorectal Paternal Grandfather      DIABETES Sister      type 2       Social History     Social History     Marital status:      Spouse name: N/A     Number of children: N/A     Years of education: N/A     Occupational History     Not on file.     Social History Main Topics     Smoking status: Current Every Day Smoker     Packs/day: 0.50     Years: 20.00     Types: Cigarettes     Smokeless  tobacco: Never Used      Comment: using E-cig     Alcohol use No     Drug use: No     Sexual activity: Yes     Partners: Male      Comment: husbandvasectomy     Other Topics Concern     Parent/Sibling W/ Cabg, Mi Or Angioplasty Before 65f 55m? No     Social History Narrative       Outpatient Encounter Prescriptions as of 1/8/2018   Medication Sig Dispense Refill     HYDROcodone-acetaminophen (NORCO)  MG per tablet Take 1/2 - 1 tablet at night as needed for back pain. 40 tablet 0     FLUVIRIN SUSP ADM 0.5ML IM UTD  0     phentermine 37.5 MG capsule Take 1/2 tablet daily in the AM. 30 capsule 0     LORazepam (ATIVAN) 0.5 MG tablet Take 1-2 tablets (0.5-1 mg) by mouth every 8 hours as needed for anxiety 20 tablet 1     fluocinonide-emollient (LIDEX-E) 0.05 % cream Apply to affected area of elbows twice daily as needed. 30 g 3     tolterodine (DETROL LA) 2 MG 24 hr capsule Take 1 capsule (2 mg) by mouth daily 30 capsule 11     acetaminophen (TYLENOL ARTHRITIS PAIN) 650 MG CR tablet Take 650 mg by mouth daily. 2 daily       acyclovir (ZOVIRAX) 5 % cream Apply  topically. Apply to affected area thinly five times daily at the first sign of symptoms. 5 g 2     MULTIVITAMINS OR 1 daily        CALCIUM + D OR 1 TABLET DAILY       No facility-administered encounter medications on file as of 1/8/2018.              Review Of Systems  Skin: As above  Eyes: negative  Ears/Nose/Throat: negative  Respiratory: No shortness of breath, dyspnea on exertion, cough, or hemoptysis  Cardiovascular: negative  Gastrointestinal: negative  Genitourinary: negative  Musculoskeletal: negative  Neurologic: negative  Psychiatric: negative  Hematologic/Lymphatic/Immunologic: negative  Endocrine: negative      O:   NAD, WDWN, Alert & Oriented, Mood & Affect wnl, Vitals stable   Here today alone   /70 (BP Location: Left arm, Patient Position: Sitting, Cuff Size: Adult Large)  Pulse 82  LMP 03/06/2011  SpO2 99%   General appearance  normal   Vitals stable   Alert, oriented and in no acute distress      Following lymph nodes palpated: Occipital, Cervical, Supraclavicular no lad   Stuck on papules and brown macules on trunk and ext    L nasal tip 5mm pink pearly papule           The remainder of expanded problem focused exam was unremarkable; the following areas were examined:  scalp/hair, conjunctiva/lids, face, neck, lips, chest, digits/nails, RUE, LUE.      Eyes: Conjunctivae/lids:Normal     ENT: Lips, buccal mucosa, tongue: normal    MSK:Normal    Cardiovascular: peripheral edema none    Pulm: Breathing Normal    Lymph Nodes: No Head and Neck Lymphadenopathy     Neuro/Psych: Orientation:Normal; Mood/Affect:Normal    MICRO  L nasal tip:Orthokeratosis of epidermis with a proliferation of nests of basaloid cells, with peripheral palisading and a haphazard arrangement in the center extending into the dermis, forming nodules.  The tumor cells have hyperchromatic nuclei. Poor cytoplasm and intercellular bridging.    A/P:  1. L nasal tip r/o basal cell carcinoma   TANGENTIAL BIOPSY IN HOUSE:  After consent, anesthesia with LEC and prep, tangential excision performed and dx above confirmed with frozen section histology.  No complications and routine wound care.  Patient told result basal cell carcinoma schedule  2. Seborrheic keratosis, lentigo  .    BENIGN LESIONS DISCUSSED WITH PATIENT:  I discussed the specifics of tumor, prognosis, and genetics of benign lesions.  I explained that treatment of these lesions would be purely cosmetic and not medically neccessary.  I discussed with patient different removal options including excision, cautery and /or laser.      Nature and genetics of benign skin lesions dicussed with patient.  Signs and Symptoms of skin cancer discussed with patient.  Patient encouraged to perform monthly skin exams.  UV precautions reviewed with patient.  Skin care regimen reviewed with patient: Eliminate harsh soaps, i.e. Dial,  zest, irsih spring; Mild soaps such as Cetaphil or Dove sensitive skin, avoid hot or cold showers, aggressive use of emollients including vanicream, cetaphil or cerave discussed with patient.    Risks of non-melanoma skin cancer discussed with patient   Return to clinic 6 months

## 2018-01-08 NOTE — PATIENT INSTRUCTIONS
Wound Care Instructions     FOR SUPERFICIAL WOUNDS     [facility] [phone]    [facility] [phone]      nose                 AFTER 24 HOURS YOU SHOULD REMOVE THE BANDAGE AND BEGIN DAILY DRESSING CHANGES AS FOLLOWS:     1) Remove Dressing.     2) Clean and dry the area with tap water using a Q-tip or sterile gauze pad.     3) Apply Vaseline, Aquaphor, Polysporin ointment or Bacitracin ointment over entire wound.  Do NOT use Neosporin ointment.     4) Cover the wound with a band-aid, or a sterile non-stick gauze pad and micropore paper tape      REPEAT THESE INSTRUCTIONS AT LEAST ONCE A DAY UNTIL THE WOUND HAS COMPLETELY HEALED.    It is an old wives tale that a wound heals better when it is exposed to air and allowed to dry out. The wound will heal faster with a better cosmetic result if it is kept moist with ointment and covered with a bandage.    **Do not let the wound dry out.**      Supplies Needed:      *Cotton tipped applicators (Q-tips)    *Polysporin Ointment or Bacitracin Ointment (NOT NEOSPORIN)    *Band-aids or non-stick gauze pads and micropore paper tape.      PATIENT INFORMATION:    During the healing process you will notice a number of changes. All wounds develop a small halo of redness surrounding the wound.  This means healing is occurring. Severe itching with extensive redness usually indicates sensitivity to the ointment or bandage tape used to dress the wound.  You should call our office if this develops.      Swelling  and/or discoloration around your surgical site is common, particularly when performed around the eye.    All wounds normally drain.  The larger the wound the more drainage there will be.  After 7-10 days, you will notice the wound beginning to shrink and new skin will begin to grow.  The wound is healed when you can see skin has formed over the entire area.  A healed wound has a healthy, shiny look to the surface and is red to dark pink in  color to normalize.  Wounds may take approximately 4-6 weeks to heal.  Larger wounds may take 6-8 weeks.  After the wound is healed you may discontinue dressing changes.    You may experience a sensation of tightness as your wound heals. This is normal and will gradually subside.    Your healed wound may be sensitive to temperature changes. This sensitivity improves with time, but if you re having a lot of discomfort, try to avoid temperature extremes.    Patients frequently experience itching after their wound appears to have healed because of the continue healing under the skin.  Plain Vaseline will help relieve the itching.        POSSIBLE COMPLICATIONS    BLEEDIN. Leave the bandage in place.  2. Use tightly rolled up gauze or a cloth to apply direct pressure over the bandage for 30  minutes.  3. Reapply pressure for an additional 30 minutes if necessary  4. Use additional gauze and tape to maintain pressure once the bleeding has stopped.      You can try a Benzoyl Peroxide wash on face      We will notify you of your biopsy results within 2 business days.

## 2018-01-16 ENCOUNTER — APPOINTMENT (OUTPATIENT)
Dept: GENERAL RADIOLOGY | Facility: CLINIC | Age: 58
End: 2018-01-16
Attending: EMERGENCY MEDICINE
Payer: COMMERCIAL

## 2018-01-16 ENCOUNTER — HOSPITAL ENCOUNTER (EMERGENCY)
Facility: CLINIC | Age: 58
Discharge: HOME | End: 2018-01-16
Attending: EMERGENCY MEDICINE | Admitting: EMERGENCY MEDICINE
Payer: COMMERCIAL

## 2018-01-16 VITALS
SYSTOLIC BLOOD PRESSURE: 115 MMHG | DIASTOLIC BLOOD PRESSURE: 76 MMHG | RESPIRATION RATE: 16 BRPM | OXYGEN SATURATION: 96 % | TEMPERATURE: 97.7 F

## 2018-01-16 DIAGNOSIS — M43.10 ANTEROLISTHESIS: ICD-10-CM

## 2018-01-16 DIAGNOSIS — M54.50 ACUTE BILATERAL LOW BACK PAIN WITHOUT SCIATICA: ICD-10-CM

## 2018-01-16 PROCEDURE — 99284 EMERGENCY DEPT VISIT MOD MDM: CPT | Mod: 25 | Performed by: EMERGENCY MEDICINE

## 2018-01-16 PROCEDURE — 72100 X-RAY EXAM L-S SPINE 2/3 VWS: CPT

## 2018-01-16 PROCEDURE — 25000128 H RX IP 250 OP 636: Performed by: EMERGENCY MEDICINE

## 2018-01-16 PROCEDURE — 99284 EMERGENCY DEPT VISIT MOD MDM: CPT | Mod: Z6 | Performed by: EMERGENCY MEDICINE

## 2018-01-16 PROCEDURE — 96372 THER/PROPH/DIAG INJ SC/IM: CPT | Performed by: EMERGENCY MEDICINE

## 2018-01-16 RX ORDER — CYCLOBENZAPRINE HCL 10 MG
10 TABLET ORAL 3 TIMES DAILY PRN
Qty: 30 TABLET | Refills: 0 | Status: SHIPPED | OUTPATIENT
Start: 2018-01-16 | End: 2018-04-20

## 2018-01-16 RX ORDER — KETOROLAC TROMETHAMINE 30 MG/ML
60 INJECTION, SOLUTION INTRAMUSCULAR; INTRAVENOUS ONCE
Status: COMPLETED | OUTPATIENT
Start: 2018-01-16 | End: 2018-01-16

## 2018-01-16 RX ORDER — TRAMADOL HYDROCHLORIDE 50 MG/1
50-100 TABLET ORAL EVERY 6 HOURS PRN
Qty: 20 TABLET | Refills: 0 | Status: SHIPPED | OUTPATIENT
Start: 2018-01-16 | End: 2018-03-07

## 2018-01-16 RX ADMIN — KETOROLAC TROMETHAMINE 60 MG: 30 INJECTION, SOLUTION INTRAMUSCULAR at 10:15

## 2018-01-16 ASSESSMENT — ENCOUNTER SYMPTOMS
GASTROINTESTINAL NEGATIVE: 1
BACK PAIN: 1
NEUROLOGICAL NEGATIVE: 1

## 2018-01-16 NOTE — ED PROVIDER NOTES
History     Chief Complaint   Patient presents with     Back Pain     Pt started having pain 3 days ago, low back pain, across back.  Unable to get pain relieved, trying to stretch and improve discomfort.  Doesn't know of specific injury, cough be      HPI  Joana Arredondo is a 57 year old female with 2 days of atraumatic LBP.  Diffuse low back pain radiating laterally towards the hips bilaterally.  Worse in the morning and better throughout the day.  Insidious onset of atraumatic sharp, severe pain which is constant and exacerbated by movement and change in position.  She has some Norco from an old prescription and does not need a refill, but this helps little.  She is wondering about trying a muscle relaxant, which she is used in the past for chronic intermittent low back pain.  She has no leg weakness or paresthesia, or bowel or bladder incontinence.  No UTI signs or symptoms or hematuria.  No upper back pain or abdominal pain.  No fever or chills.  No other acute complaints or concerns.    Problem List:    Patient Active Problem List    Diagnosis Date Noted     Controlled substance agreement signed 03/10/2016     Priority: Medium     Mixed stress and urge urinary incontinence 01/22/2016     Priority: Medium     Anxiety 03/05/2014     Priority: Medium     Mild obesity 05/12/2013     Priority: Medium     Major depression in complete remission (H) 04/20/2011     Priority: Medium     Probably mild seasonal affective disorder too, does have light box at home and encouraged to use  Off SSRIs, doing well (PHQ-9 1), feels is benefiting from low dose HRT       CARDIOVASCULAR SCREENING; LDL GOAL LESS THAN 130 10/31/2010     Priority: Medium     NICOLETTE (obstructive sleep apnea)-Mild (AHI 6) 10/05/2010     Priority: Medium     Chronic low back pain 11/24/2008     Priority: Medium     724.5 BACK PAIN  Followed with Dr. Montano  Note: She has had therapy and does exercises and changed mattresses, but every once in a while she  has low back pain precluding sleep.  Plan: HYDROCODONE-ACETAMINOPHEN 10/325  #40 at at time, taking 1/2 to 1 tablet at bedtime PRN        Refill -- she has been using this judiciously.  Over the past year (since spring 2014 - summer 2015) a supply of 40 tabs has generally been lasting about 3 months   Patient last seen regarding the back pain in November 2014, OK to continue quarterly refill when requested, call patient if requesting refill significantly before 3 months  Will obtain written narcotic agreement at next clinic visit.          Past Medical History:    Past Medical History:   Diagnosis Date     Neoplasm of uncertain behavior of skin 3/13/2012     Tobacco use disorder 11/30/2005       Past Surgical History:    Past Surgical History:   Procedure Laterality Date     COLONOSCOPY  10/22/2012    Procedure: COLONOSCOPY;  Colonoscopy;  Surgeon: Robert Encinas MD;  Location: [facility]     ENT SURGERY       LAPAROSCOPIC CHOLECYSTECTOMY  10/21/2010    LAPAROSCOPIC CHOLECYSTECTOMY performed by DAMIR WING at [facility]     SURGICAL HISTORY OF -   1973    Rt wrist surgery     SURGICAL HISTORY OF -   2004    breast augmentation     SURGICAL HISTORY OF -   10/15/2010    EUS - GB bulding w/sludge, Nl bile duct w/no choledocholithiasis or sludge.      SURGICAL HISTORY OF -   2/9/2006    1.  Left shoulder arthroscopy with arthroscopic subacromial decompression.      TONSILLECTOMY & ADENOIDECTOMY  as child    tonsils and adenoids       Family History:    Family History   Problem Relation Age of Onset     DIABETES Father      type 2     C.A.D. Father      HEART DISEASE Father      had 2 heart attacks,68 for first one 79 on second     Cancer - colorectal Paternal Grandfather      DIABETES Sister      type 2       Social History:  Marital Status:   [2]  Social History   Substance Use Topics     Smoking status: Current Every Day Smoker     Packs/day: 0.50     Years: 20.00     Types: Cigarettes     Smokeless tobacco:  Never Used      Comment: using E-cig     Alcohol use No        Medications:      cyclobenzaprine (FLEXERIL) 10 MG tablet   traMADol (ULTRAM) 50 MG tablet   HYDROcodone-acetaminophen (NORCO)  MG per tablet   tolterodine (DETROL LA) 2 MG 24 hr capsule   acetaminophen (TYLENOL ARTHRITIS PAIN) 650 MG CR tablet   MULTIVITAMINS OR   CALCIUM + D OR   phentermine 37.5 MG capsule   LORazepam (ATIVAN) 0.5 MG tablet   fluocinonide-emollient (LIDEX-E) 0.05 % cream   acyclovir (ZOVIRAX) 5 % cream       Review of Systems   Gastrointestinal: Negative.    Genitourinary: Negative.    Musculoskeletal: Positive for back pain.   Skin: Negative.    Neurological: Negative.        Physical Exam   BP: 110/68  Heart Rate: 94  Temp: 97.7  F (36.5  C)  Resp: 16  SpO2: 98 %      Physical Exam   Constitutional: She is oriented to person, place, and time. She appears well-developed and well-nourished. No distress.   HENT:   Head: Normocephalic and atraumatic.   Eyes: Conjunctivae and EOM are normal. No scleral icterus.   Neck: Normal range of motion. Neck supple. No tracheal deviation present.   Cardiovascular: Normal rate, regular rhythm and normal heart sounds.  Exam reveals no gallop and no friction rub.    No murmur heard.  Pulmonary/Chest: Effort normal and breath sounds normal. No respiratory distress. She has no wheezes. She has no rales.   Abdominal: Soft. Bowel sounds are normal. She exhibits no distension. There is no tenderness.   Musculoskeletal: Normal range of motion. She exhibits tenderness ( diffuse LBP as diagrammed). She exhibits no edema.        Back:    Neurological: She is alert and oriented to person, place, and time. She has normal strength and normal reflexes. No sensory deficit.   Skin: Skin is warm and dry. No rash noted. She is not diaphoretic. No erythema. No pallor.   Psychiatric: She has a normal mood and affect. Her behavior is normal.   Nursing note and vitals reviewed.      ED Course     ED Course      Procedures           Results for orders placed or performed during the hospital encounter of 01/16/18   XR Lumbar Spine 2/3 Views    Narrative    LUMBAR SPINE TWO TO THREE VIEWS January 16, 2018 10:15 AM    HISTORY: Atraumatic pain.    COMPARISON: None.      Impression    IMPRESSION: Grade 1 anterolisthesis of L4 on L5. Suspicious for  chronic pars defect at this level. Degenerative facet changes at L4  and L5. Otherwise negative.    MYRA BARRIOS MD          Labs Ordered and Resulted from Time of ED Arrival Up to the Time of Departure from the ED - No data to display    Medications   ketorolac (TORADOL) injection 60 mg (60 mg Intramuscular Given 1/16/18 1015)       Assessments & Plan (with Medical Decision Making)   Atraumatic LBP w/o sx of cauda equina syndrome or neurologic claudication. Plain films (never prev performed for recurring LBP) remarkable for L4 on L5 spondylolithesis. She has Norco, will rx Flexeril and have her use NSAID. PCC recheck and  Refer her for PT. Patient was provided instructions for supportive care and will return as needed for worsened condition or worsening symptoms, or new problems or concerns.    I have reviewed the nursing notes.    I have reviewed the findings, diagnosis, plan and need for follow up with the patient.      Discharge Medication List as of 1/16/2018 11:11 AM      START taking these medications    Details   cyclobenzaprine (FLEXERIL) 10 MG tablet Take 1 tablet (10 mg) by mouth 3 times daily as needed for muscle spasms, Disp-30 tablet, R-0, E-Prescribe             Final diagnoses:   Acute bilateral low back pain without sciatica   Anterolisthesis - L4 and L5       1/16/2018   [facility] EMERGENCY DEPARTMENT     Jesus Andrade MD  01/20/18 2139

## 2018-01-16 NOTE — ED AVS SNAPSHOT
[facility] Emergency Department    [address] [address]    Phone:  [phone]    Fax:  [phone]                                       Joana Arredondo   MRN: [id number]    Department:  [facility] Emergency Department   Date of Visit:  1/16/2018           After Visit Summary Signature Page     I have received my discharge instructions, and my questions have been answered. I have discussed any challenges I see with this plan with the nurse or doctor.    ..........................................................................................................................................  Patient/Patient Representative Signature      ..........................................................................................................................................  Patient Representative Print Name and Relationship to Patient    ..................................................               ................................................  Date                                            Time    ..........................................................................................................................................  Reviewed by Signature/Title    ...................................................              ..............................................  Date                                                            Time

## 2018-01-16 NOTE — ED AVS SNAPSHOT
[facility]    [address] [address]    Phone:  [phone]    Fax:  [phone]                                       Joana Arredondo   MRN: [id number]    Department:  [facility]   Date of Visit:  1/16/2018           Patient Information     Date Of Birth          1960        Your diagnoses for this visit were:     Acute bilateral low back pain without sciatica        You were seen by Jesus Andrade MD.      Follow-up Information     Follow up with [facility].    Specialty:  EMERGENCY MEDICINE    Why:  If symptoms worsen    Contact information:    [address] [address] [phone]    Additional information:    The medical center is located at   [address]. (between [address] in [place], [place] miles north   of [place]).        Schedule an appointment as soon as possible for a visit with No Ref-Primary, Physician.        Discharge Instructions       When You Have Low Back Pain  Caring for Your Back  You are not alone.  Low back pain is very common. Nearly half of all adults have low back pain in any given year.  The good news is that back pain is rarely a danger to your health. Most people can manage their back pain on their own and about half of them start feeling better within 2 weeks. In 9 out of 10 cases, low back pain goes away or no longer limits daily activity within 6 weeks.  Your outlook is good!  Your symptoms tell us that your low back pain is most likely not a danger to you. Most of the time we do not know the exact cause of low back pain, even if you see a doctor or have an MRI. However, treatment can still work without knowing the cause of the pain. Less than 1 in 100 people need surgery for their back pain.  What can I do about my low back pain?  There are three things you can do to ease low back pain and help it go away.    Use heat or cold  packs.    Take medicine as directed.    Use positions, movements and exercises.  Using heat or cold packs  Try cold packs or gentle heat to ease your pain. Use whichever gives you the most relief. Apply the cold pack or heat for 15 minutes at a time, as often as needed.  Taking medicine    If your doctor has prescribed medicine, be sure to follow the directions.    If you take over-the-counter medicine, read and follow the directions.    Talk to your doctor if you have any questions.  Using positions, movements and exercises  Research tells us that moving your joints and muscles can help you recover from back pain. Such activity should be simple and gentle.  Use the positions in the photos as well as walking to help relieve your pain. Try taking a short walk every 3 to 4 hours during the day. Walk for a few minutes inside your home or take longer walks outside, on a treadmill or at a mall. Slowly increase the amount of time you walk.  Expect discomfort when you begin, but it should lessen as your back starts to heal. When your back feels better, walk daily to keep your back and body healthy.  Finding a comfortable position  When your back pain is new, certain positions will ease your pain. Gently try each of the positions below until you find one that is helpful. Once you find a position of comfort, use it as often as you like when you are resting. You will recover faster if you combine rest with activity.         When should I call my doctor?  Your back pain should improve over the first couple of weeks. As it improves, you should be able to return to your normal activities. But call your doctor if:    You have a sudden change in your ability to control?your bladder or bowels.    You feel tingling in your groin or legs.    The pain spreads down your leg and into your foot.    Your toes, feet or leg muscles feel weak.    You feel generally unwell or sick.    Your pain does not get better or gets worse.  For  informational purposes only. Not to replace the advice of your health care provider.  Copyright   2013 [facility]. All rights reserved. [employer] [id number] - REV 03/16.      Back Basics: A Healthy Spine  A healthy spine supports the body while letting it move freely. It does this with the help of three natural curves. Strong, flexible muscles help, too. They support the spine by keeping its curves properly aligned. The disks that cushion the bones of your spine also play a role in back fitness.    Three natural curves  The spine is made of bones (vertebrae) and pads of soft tissue (disks). These parts are arranged in three curves: cervical, thoracic, and lumbar. When properly aligned, these curves keep your body balanced. They also support your body when you move. By distributing your weight throughout your spine, the curves make back injuries less likely.  Strong, flexible muscles  Strong, flexible back muscles help support the three curves of the spine. They do so by holding the vertebrae and disks in proper alignment. Strong, flexible abdominal, hip, and leg muscles also reduce strain on the back.  The lumbar curve  The lumbar curve is the hardest-working part of the spine. It carries more weight and moves the most. Aligning this curve helps prevent damage to vertebrae, disks, and other parts of the spine.  Cushioning disks  Disks are the soft pads of tissue between the vertebrae. The disks absorb shock caused by movement. Each disk has a spongy center (nucleus) and a tougher outer ring (annulus). Movement within the nucleus allows the vertebrae to rock back and forth on the disks. This provides the flexibility needed to bend and move.       Date Last Reviewed: 10/18/2015    2459-0821 [employer]. [address] [place] [address]. All rights reserved. This information is not intended as a substitute for professional medical care. Always follow your healthcare professional's  "instructions.          Discharge References/Attachments     BACK, HOW IT WORKS (ENGLISH)    BACK, CARING FOR THROUGHOUT THE DAY (ENGLISH)    BACK PAIN, RELIEVING (ENGLISH)    BACK PAIN, LOW: CAUSES OF LUMBAR (ENGLISH)    BACK EXERCISES, LUMBAR (ENGLISH)      Future Appointments        Provider Department Dept Phone Center    1/22/2018 7:30 AM Tato Dias MD [facility] [phone] [facility]      24 Hour Appointment Hotline       To make an appointment at any [facility], call [phone] ([phone]). If you don't have a family doctor or clinic, we will help you find one. [facility] are conveniently located to serve the needs of you and your family.          ED Discharge Orders     PHYSICAL THERAPY REFERRAL       *This therapy referral will be filtered to a centralized scheduling office at [facility] and the patient will receive a call to schedule an appointment at a [place] location most convenient for them. *     [facility] provides Physical Therapy evaluation and treatment and many specialty services across the [place] system.  If requesting a specialty program, please choose from the list below.    If you have not heard from the scheduling office within 2 business days, please call [phone] for all locations, with the exception of [place], please call [phone].  Treatment: Evaluation & Treatment  Special Instructions/Modalities: As deemed appropriate  Special Programs: As deemed appropriate  Please be aware that coverage of these services is subject to the terms and limitations of your health insurance plan.  Call member services at your health plan with any benefit or coverage questions.      **Note to Provider:  If you are referring outside of [place] for the therapy appointment, please list the name of the location in the \"special instructions\" above, print the referral and give to the patient to schedule the " appointment.                     Review of your medicines      START taking        Dose / Directions Last dose taken    cyclobenzaprine 10 MG tablet   Commonly known as:  FLEXERIL   Dose:  10 mg   Quantity:  30 tablet        Take 1 tablet (10 mg) by mouth 3 times daily as needed for muscle spasms   Refills:  0          Our records show that you are taking the medicines listed below. If these are incorrect, please call your family doctor or clinic.        Dose / Directions Last dose taken    acyclovir 5 % cream   Commonly known as:  ZOVIRAX   Quantity:  5 g        Apply  topically. Apply to affected area thinly five times daily at the first sign of symptoms.   Refills:  2        CALCIUM + D PO        1 TABLET DAILY   Refills:  0        fluocinonide-emollient 0.05 % cream   Commonly known as:  LIDEX-E   Quantity:  30 g        Apply to affected area of elbows twice daily as needed.   Refills:  3        HYDROcodone-acetaminophen  MG per tablet   Commonly known as:  NORCO   Quantity:  40 tablet        Take 1/2 - 1 tablet at night as needed for back pain.   Refills:  0        LORazepam 0.5 MG tablet   Commonly known as:  ATIVAN   Dose:  0.5-1 mg   Quantity:  20 tablet        Take 1-2 tablets (0.5-1 mg) by mouth every 8 hours as needed for anxiety   Refills:  1        MULTIVITAMINS PO        1 daily   Refills:  0        phentermine 37.5 MG capsule   Quantity:  30 capsule        Take 1/2 tablet daily in the AM.   Refills:  0        tolterodine 2 MG 24 hr capsule   Commonly known as:  DETROL LA   Dose:  2 mg   Quantity:  30 capsule        Take 1 capsule (2 mg) by mouth daily   Refills:  11        TYLENOL ARTHRITIS PAIN 650 MG CR tablet   Dose:  1300 mg   Generic drug:  acetaminophen        Take 1,300 mg by mouth every 8 hours as needed   Refills:  0                Prescriptions were sent or printed at these locations (1 Prescription)                   [facility] [place] - [place], [place] - [address]   [address] [address]    Telephone:  [phone]   Fax:  [phone]   Hours:                  E-Prescribed (1 of 1)         cyclobenzaprine (FLEXERIL) 10 MG tablet                Procedures and tests performed during your visit     XR Lumbar Spine 2/3 Views      Orders Needing Specimen Collection     Ordered          01/16/18 1058  Urine Culture - ROUTINE, Prio: Routine, Needs to be Collected     Scheduled Task Status   01/16/18 1059 Collect Urine Culture Open   Order Class:  PCU Collect                01/16/18 1058  UA with Microscopic - STAT, Prio: STAT, Needs to be Collected     Scheduled Task Status   01/16/18 1059 Collect UA with Microscopic Open   Order Class:  PCU Collect                  Pending Results     Date and Time Order Name Status Description    1/16/2018 0954 XR Lumbar Spine 2/3 Views Preliminary             Pending Culture Results     No orders found from 1/14/2018 to 1/17/2018.            Pending Results Instructions     If you had any lab results that were not finalized at the time of your Discharge, you can call the ED Lab Result RN at [phone]. You will be contacted by this team for any positive Lab results or changes in treatment. The nurses are available 7 days a week from 10A to 6:30P.  You can leave a message 24 hours per day and they will return your call.        Test Results From Your Hospital Stay        1/16/2018 10:21 AM      Narrative     LUMBAR SPINE TWO TO THREE VIEWS January 16, 2018 10:15 AM    HISTORY: Atraumatic pain.    COMPARISON: None.        Impression     IMPRESSION: Grade 1 anterolisthesis of L4 on L5. Suspicious for  chronic pars defect at this level. Degenerative facet changes at L4  and L5. Otherwise negative.                Thank you for choosing [facility]       Thank you for choosing [facility] for your care. Our goal is always to provide you with excellent care. Hearing back from our patients is one way we can continue to improve our services. Please  take a few minutes to complete the written survey that you may receive in the mail after you visit with us. Thank you!        [employer] Information     [employer] gives you secure access to your electronic health record. If you see a primary care provider, you can also send messages to your care team and make appointments. If you have questions, please call your primary care clinic.  If you do not have a primary care provider, please call [phone] and they will assist you.        Care EveryWhere ID     This is your Care EveryWhere ID. This could be used by other organizations to access your [facility] medical records  [id number]        Equal Access to Services     [facility] : Massimo Delaney, j carlos garner, david gray, samuel kamara . So [facility] [phone].    ATENCIÓN: Si habla español, tiene a flanagan disposición servicios gratuitos de asistencia lingüística. Llame al [phone].    We comply with applicable federal civil rights laws and [place] laws. We do not discriminate on the basis of race, color, national origin, age, disability, sex, sexual orientation, or gender identity.            After Visit Summary       This is your record. Keep this with you and show to your community pharmacist(s) and doctor(s) at your next visit.

## 2018-01-16 NOTE — DISCHARGE INSTRUCTIONS
When You Have Low Back Pain  Caring for Your Back  You are not alone.  Low back pain is very common. Nearly half of all adults have low back pain in any given year.  The good news is that back pain is rarely a danger to your health. Most people can manage their back pain on their own and about half of them start feeling better within 2 weeks. In 9 out of 10 cases, low back pain goes away or no longer limits daily activity within 6 weeks.  Your outlook is good!  Your symptoms tell us that your low back pain is most likely not a danger to you. Most of the time we do not know the exact cause of low back pain, even if you see a doctor or have an MRI. However, treatment can still work without knowing the cause of the pain. Less than 1 in 100 people need surgery for their back pain.  What can I do about my low back pain?  There are three things you can do to ease low back pain and help it go away.    Use heat or cold packs.    Take medicine as directed.    Use positions, movements and exercises.  Using heat or cold packs  Try cold packs or gentle heat to ease your pain. Use whichever gives you the most relief. Apply the cold pack or heat for 15 minutes at a time, as often as needed.  Taking medicine    If your doctor has prescribed medicine, be sure to follow the directions.    If you take over-the-counter medicine, read and follow the directions.    Talk to your doctor if you have any questions.  Using positions, movements and exercises  Research tells us that moving your joints and muscles can help you recover from back pain. Such activity should be simple and gentle.  Use the positions in the photos as well as walking to help relieve your pain. Try taking a short walk every 3 to 4 hours during the day. Walk for a few minutes inside your home or take longer walks outside, on a treadmill or at a mall. Slowly increase the amount of time you walk.  Expect discomfort when you begin, but it should lessen as your back starts  to heal. When your back feels better, walk daily to keep your back and body healthy.  Finding a comfortable position  When your back pain is new, certain positions will ease your pain. Gently try each of the positions below until you find one that is helpful. Once you find a position of comfort, use it as often as you like when you are resting. You will recover faster if you combine rest with activity.         When should I call my doctor?  Your back pain should improve over the first couple of weeks. As it improves, you should be able to return to your normal activities. But call your doctor if:    You have a sudden change in your ability to control?your bladder or bowels.    You feel tingling in your groin or legs.    The pain spreads down your leg and into your foot.    Your toes, feet or leg muscles feel weak.    You feel generally unwell or sick.    Your pain does not get better or gets worse.  For informational purposes only. Not to replace the advice of your health care provider.  Copyright   2013 [employer]. All rights reserved. [employer] [id number] - REV 03/16.      Back Basics: A Healthy Spine  A healthy spine supports the body while letting it move freely. It does this with the help of three natural curves. Strong, flexible muscles help, too. They support the spine by keeping its curves properly aligned. The disks that cushion the bones of your spine also play a role in back fitness.    Three natural curves  The spine is made of bones (vertebrae) and pads of soft tissue (disks). These parts are arranged in three curves: cervical, thoracic, and lumbar. When properly aligned, these curves keep your body balanced. They also support your body when you move. By distributing your weight throughout your spine, the curves make back injuries less likely.  Strong, flexible muscles  Strong, flexible back muscles help support the three curves of the spine. They do so by holding the vertebrae and disks in  proper alignment. Strong, flexible abdominal, hip, and leg muscles also reduce strain on the back.  The lumbar curve  The lumbar curve is the hardest-working part of the spine. It carries more weight and moves the most. Aligning this curve helps prevent damage to vertebrae, disks, and other parts of the spine.  Cushioning disks  Disks are the soft pads of tissue between the vertebrae. The disks absorb shock caused by movement. Each disk has a spongy center (nucleus) and a tougher outer ring (annulus). Movement within the nucleus allows the vertebrae to rock back and forth on the disks. This provides the flexibility needed to bend and move.       Date Last Reviewed: 10/18/2015    5693-6426 [employer]. [address] [place] [address], [place] [address]. All rights reserved. This information is not intended as a substitute for professional medical care. Always follow your healthcare professional's instructions.

## 2018-01-16 NOTE — ED NOTES
Pt feeling better after resting a little.  Using ice after arrival to ED and sitting up in bed.  Pt feels like position of sitting helped her relax.

## 2018-01-17 ENCOUNTER — NURSE TRIAGE (OUTPATIENT)
Dept: NURSING | Facility: CLINIC | Age: 58
End: 2018-01-17

## 2018-01-17 NOTE — TELEPHONE ENCOUNTER
Sunday woke up with a bad lower back ache.  Yesterday went to ED and given Flexeril.  Xray done and was told to schedule with primary MD.  Joana is calling to schedule appointment with ortho MD.  FNA advised walk in ortho or schedule appointment.

## 2018-01-22 ENCOUNTER — OFFICE VISIT (OUTPATIENT)
Dept: DERMATOLOGY | Facility: CLINIC | Age: 58
End: 2018-01-22
Payer: COMMERCIAL

## 2018-01-22 VITALS — HEART RATE: 112 BPM | OXYGEN SATURATION: 99 % | DIASTOLIC BLOOD PRESSURE: 67 MMHG | SYSTOLIC BLOOD PRESSURE: 126 MMHG

## 2018-01-22 DIAGNOSIS — C44.311 BASAL CELL CARCINOMA OF LEFT NASAL TIP: Primary | ICD-10-CM

## 2018-01-22 PROCEDURE — 14061 TIS TRNFR E/N/E/L10.1-30SQCM: CPT | Performed by: DERMATOLOGY

## 2018-01-22 PROCEDURE — 17311 MOHS 1 STAGE H/N/HF/G: CPT | Mod: 51 | Performed by: DERMATOLOGY

## 2018-01-22 NOTE — NURSING NOTE
Surgical Office Location :   [facility]  [address] [place] [address]

## 2018-01-22 NOTE — NURSING NOTE
"Chief Complaint   Patient presents with     Derm Problem     MOHS left nasal tip       Initial /67 (BP Location: Right arm, Patient Position: Chair, Cuff Size: Adult Regular)  Pulse 112  LMP 03/06/2011  SpO2 99% Estimated body mass index is 36.92 kg/(m^2) as calculated from the following:    Height as of 3/10/16: 1.695 m (5' 6.75\").    Weight as of 1/2/18: 106.1 kg (234 lb).  Medication Reconciliation: complete     Jessica Bahena CMA      "

## 2018-01-22 NOTE — PROGRESS NOTES
Joana Arredondo is a 57 year old year old female patient here today for evaluation and managment of basal cell carcinoma on nose. Patient reports the following modifying factors none.  Associated symptoms: none.  Patient has no other skin complaints today.  Remainder of the HPI, Meds, PMH, Allergies, FH, and SH was reviewed in chart.      Past Medical History:   Diagnosis Date     Neoplasm of uncertain behavior of skin 3/13/2012     Tobacco use disorder 11/30/2005    Quit October 2007       Past Surgical History:   Procedure Laterality Date     COLONOSCOPY  10/22/2012    Procedure: COLONOSCOPY;  Colonoscopy;  Surgeon: Robert Encinas MD;  Location: [facility]     ENT SURGERY       LAPAROSCOPIC CHOLECYSTECTOMY  10/21/2010    LAPAROSCOPIC CHOLECYSTECTOMY performed by DAMIR WING at [facility]     SURGICAL HISTORY OF -   1973    Rt wrist surgery     SURGICAL HISTORY OF -   2004    breast augmentation     SURGICAL HISTORY OF -   10/15/2010    EUS - GB bulding w/sludge, Nl bile duct w/no choledocholithiasis or sludge.      SURGICAL HISTORY OF -   2/9/2006    1.  Left shoulder arthroscopy with arthroscopic subacromial decompression.      TONSILLECTOMY & ADENOIDECTOMY  as child    tonsils and adenoids        Family History   Problem Relation Age of Onset     DIABETES Father      type 2     C.A.D. Father      HEART DISEASE Father      had 2 heart attacks,68 for first one 79 on second     Cancer - colorectal Paternal Grandfather      DIABETES Sister      type 2       Social History     Social History     Marital status:      Spouse name: N/A     Number of children: N/A     Years of education: N/A     Occupational History     Not on file.     Social History Main Topics     Smoking status: Current Every Day Smoker     Packs/day: 0.50     Years: 20.00     Types: Cigarettes     Smokeless tobacco: Never Used      Comment: using E-cig     Alcohol use No     Drug use: No     Sexual activity: Yes     Partners: Male       Comment: husbandvasectomy     Other Topics Concern     Parent/Sibling W/ Cabg, Mi Or Angioplasty Before 65f 55m? No     Social History Narrative       Outpatient Encounter Prescriptions as of 1/22/2018   Medication Sig Dispense Refill     cyclobenzaprine (FLEXERIL) 10 MG tablet Take 1 tablet (10 mg) by mouth 3 times daily as needed for muscle spasms 30 tablet 0     traMADol (ULTRAM) 50 MG tablet Take 1-2 tablets ( mg) by mouth every 6 hours as needed for pain 20 tablet 0     HYDROcodone-acetaminophen (NORCO)  MG per tablet Take 1/2 - 1 tablet at night as needed for back pain. (Patient taking differently: Take 0.5-1 tablets by mouth every evening as needed for pain Take 1/2 - 1 tablet at night as needed for back pain.) 40 tablet 0     phentermine 37.5 MG capsule Take 1/2 tablet daily in the AM. 30 capsule 0     LORazepam (ATIVAN) 0.5 MG tablet Take 1-2 tablets (0.5-1 mg) by mouth every 8 hours as needed for anxiety 20 tablet 1     fluocinonide-emollient (LIDEX-E) 0.05 % cream Apply to affected area of elbows twice daily as needed. 30 g 3     tolterodine (DETROL LA) 2 MG 24 hr capsule Take 1 capsule (2 mg) by mouth daily 30 capsule 11     acetaminophen (TYLENOL ARTHRITIS PAIN) 650 MG CR tablet Take 1,300 mg by mouth every 8 hours as needed        acyclovir (ZOVIRAX) 5 % cream Apply  topically. Apply to affected area thinly five times daily at the first sign of symptoms. 5 g 2     MULTIVITAMINS OR 1 daily        CALCIUM + D OR 1 TABLET DAILY       No facility-administered encounter medications on file as of 1/22/2018.              Review Of Systems  Skin: As above  Eyes: negative  Ears/Nose/Throat: negative  Respiratory: No shortness of breath, dyspnea on exertion, cough, or hemoptysis  Cardiovascular: negative  Gastrointestinal: negative  Genitourinary: negative  Musculoskeletal: negative  Neurologic: negative  Psychiatric: negative  Hematologic/Lymphatic/Immunologic: negative  Endocrine: negative      O:    NAD, WDWN, Alert & Oriented, Mood & Affect wnl, Vitals stable   Here today alone   /67 (BP Location: Right arm, Patient Position: Chair, Cuff Size: Adult Regular)  Pulse 112  LMP 03/06/2011  SpO2 99%   General appearance normal   Vitals stable   Alert, oriented and in no acute distress     L nasal tip 5mm scaly papule       Eyes: Conjunctivae/lids:Normal     ENT: Lips, buccal mucosa, tongue: normal    MSK:Normal    Cardiovascular: peripheral edema none    Pulm: Breathing Normal    Neuro/Psych: Orientation:Normal; Mood/Affect:Normal      A/P:  1. L nasal tip 5mm scaly papule   MOHS:   Location    After PGACAC discussed with patient, decision for Mohs surgery was made. Indication for Mohs was Location. Patient confirmed the site with Dr. Dias.  After anesthesia with LEC, the tumor was excised using standard Mohs technique in 1 stages(s).  CLEAR MARGINS OBTAINED and Final defect size was 1 cm.       REPAIR WITH BUROW'S FLAP: Because of the Because of the size and full thickness nature of the defect, an advancement flap was planned. After LEC anesthesia and prep, the Burow's triangles were excised. One Burow's triangle was displaced laterally along alar groove to hide incisions within skin relaxation lines. The advancement flap was raised by dissection in the deep subcutaneous plane. The remaining wound edges were undermined and hemostasis was obtained. The flap was advanced into the defect with care to avoid distortion and was sutured into place in a layered fashion using Vicryl and Fast Absorbing sutures. Postoperative size was 3.7 x 3.4 cm.  EBL minimal; complications none; wound care routine.  The patient was discharged in good condition and will return in one week for wound evaluation.      BENIGN LESIONS DISCUSSED WITH PATIENT:  I discussed the specifics of tumor, prognosis, and genetics of benign lesions.  I explained that treatment of these lesions would be purely cosmetic and not medically  neccessary.  I discussed with patient different removal options including excision, cautery and /or laser.      Nature and genetics of benign skin lesions dicussed with patient.  Signs and Symptoms of skin cancer discussed with patient.  Patient encouraged to perform monthly skin exams.  UV precautions reviewed with patient.  Skin care regimen reviewed with patient: Eliminate harsh soaps, i.e. Dial, zest, irsih spring; Mild soaps such as Cetaphil or Dove sensitive skin, avoid hot or cold showers, aggressive use of emollients including vanicream, cetaphil or cerave discussed with patient.    Risks of non-melanoma skin cancer discussed with patient   Return to clinic 6 months

## 2018-01-22 NOTE — PATIENT INSTRUCTIONS
Sutured Wound Care     [facility]: [phone]    [facility]: [phone]          ? No strenuous activity for 48 hours. Resume moderate activity in 48 hours. No heavy exercising until you are seen for follow up in one week.     ? Take Tylenol as needed for discomfort.                         ? Do not drink alcoholic beverages for 48 hours.     ? Keep the pressure bandage in place for 24 hours. If the bandage becomes blood tinged or loose, reinforce it with gauze and tape.        (Refer to the reverse side of this page for management of bleeding).    ? Remove pressure bandage in 24 hours     ? Leave the flat bandage in place until your follow up appointment.    ? Keep the bandage dry. Wash around it carefully.    ? If the tape becomes soiled or starts to come off, reinforce it with additional paper tape.    ? Do not smoke for 3 weeks; smoking is detrimental to wound healing.    ? It is normal to have swelling and bruising around the surgical site. The bruising will fade in approximately 10-14 days. Elevate the area to reduce swelling.    ? Numbness, itchiness and sensitivity to temperature changes can occur after surgery and may take up to 18 months to normalize.      POSSIBLE COMPLICATIONS    BLEEDIN. Leave the bandage in place.  2. Use tightly rolled up gauze or a cloth to apply direct pressure over the bandage for 20   minutes.  3. Reapply pressure for an additional 20 minutes if necessary  4. Call the office or go to the nearest emergency room if pressure fails to stop the bleeding.  5. Use additional gauze and tape to maintain pressure once the bleeding has stopped.        PAIN:    1. Post operative pain should slowly get better, never worse.  2. A severe increase in pain may indicate a problem. Call the office if this occurs.    In case of emergency phone:Dr Dias [phone]

## 2018-01-22 NOTE — LETTER
1/22/2018         RE: Joana Arredondo  [address] [place] [address]        Dear Colleague,    Thank you for referring your patient, Joana Arredondo, to the [facility]. Please see a copy of my visit note below.    Joana Arredondo is a 57 year old year old female patient here today for evaluation and managment of basal cell carcinoma on nose. Patient reports the following modifying factors none.  Associated symptoms: none.  Patient has no other skin complaints today.  Remainder of the HPI, Meds, PMH, Allergies, FH, and SH was reviewed in chart.      Past Medical History:   Diagnosis Date     Neoplasm of uncertain behavior of skin 3/13/2012     Tobacco use disorder 11/30/2005    Quit October 2007       Past Surgical History:   Procedure Laterality Date     COLONOSCOPY  10/22/2012    Procedure: COLONOSCOPY;  Colonoscopy;  Surgeon: Robert Encinas MD;  Location: [facility]     ENT SURGERY       LAPAROSCOPIC CHOLECYSTECTOMY  10/21/2010    LAPAROSCOPIC CHOLECYSTECTOMY performed by DAMIR WING at [facility]     SURGICAL HISTORY OF -   1973    Rt wrist surgery     SURGICAL HISTORY OF -   2004    breast augmentation     SURGICAL HISTORY OF -   10/15/2010    EUS - GB bulding w/sludge, Nl bile duct w/no choledocholithiasis or sludge.      SURGICAL HISTORY OF -   2/9/2006    1.  Left shoulder arthroscopy with arthroscopic subacromial decompression.      TONSILLECTOMY & ADENOIDECTOMY  as child    tonsils and adenoids        Family History   Problem Relation Age of Onset     DIABETES Father      type 2     C.A.D. Father      HEART DISEASE Father      had 2 heart attacks,68 for first one 79 on second     Cancer - colorectal Paternal Grandfather      DIABETES Sister      type 2       Social History     Social History     Marital status:      Spouse name: N/A     Number of children: N/A     Years of education: N/A     Occupational History     Not on file.     Social History Main Topics      Smoking status: Current Every Day Smoker     Packs/day: 0.50     Years: 20.00     Types: Cigarettes     Smokeless tobacco: Never Used      Comment: using E-cig     Alcohol use No     Drug use: No     Sexual activity: Yes     Partners: Male      Comment: husbandvasectomy     Other Topics Concern     Parent/Sibling W/ Cabg, Mi Or Angioplasty Before 65f 55m? No     Social History Narrative       Outpatient Encounter Prescriptions as of 1/22/2018   Medication Sig Dispense Refill     cyclobenzaprine (FLEXERIL) 10 MG tablet Take 1 tablet (10 mg) by mouth 3 times daily as needed for muscle spasms 30 tablet 0     traMADol (ULTRAM) 50 MG tablet Take 1-2 tablets ( mg) by mouth every 6 hours as needed for pain 20 tablet 0     HYDROcodone-acetaminophen (NORCO)  MG per tablet Take 1/2 - 1 tablet at night as needed for back pain. (Patient taking differently: Take 0.5-1 tablets by mouth every evening as needed for pain Take 1/2 - 1 tablet at night as needed for back pain.) 40 tablet 0     phentermine 37.5 MG capsule Take 1/2 tablet daily in the AM. 30 capsule 0     LORazepam (ATIVAN) 0.5 MG tablet Take 1-2 tablets (0.5-1 mg) by mouth every 8 hours as needed for anxiety 20 tablet 1     fluocinonide-emollient (LIDEX-E) 0.05 % cream Apply to affected area of elbows twice daily as needed. 30 g 3     tolterodine (DETROL LA) 2 MG 24 hr capsule Take 1 capsule (2 mg) by mouth daily 30 capsule 11     acetaminophen (TYLENOL ARTHRITIS PAIN) 650 MG CR tablet Take 1,300 mg by mouth every 8 hours as needed        acyclovir (ZOVIRAX) 5 % cream Apply  topically. Apply to affected area thinly five times daily at the first sign of symptoms. 5 g 2     MULTIVITAMINS OR 1 daily        CALCIUM + D OR 1 TABLET DAILY       No facility-administered encounter medications on file as of 1/22/2018.              Review Of Systems  Skin: As above  Eyes: negative  Ears/Nose/Throat: negative  Respiratory: No shortness of breath, dyspnea on exertion,  cough, or hemoptysis  Cardiovascular: negative  Gastrointestinal: negative  Genitourinary: negative  Musculoskeletal: negative  Neurologic: negative  Psychiatric: negative  Hematologic/Lymphatic/Immunologic: negative  Endocrine: negative      O:   NAD, WDWN, Alert & Oriented, Mood & Affect wnl, Vitals stable   Here today alone   /67 (BP Location: Right arm, Patient Position: Chair, Cuff Size: Adult Regular)  Pulse 112  LMP 03/06/2011  SpO2 99%   General appearance normal   Vitals stable   Alert, oriented and in no acute distress     L nasal tip 5mm scaly papule       Eyes: Conjunctivae/lids:Normal     ENT: Lips, buccal mucosa, tongue: normal    MSK:Normal    Cardiovascular: peripheral edema none    Pulm: Breathing Normal    Neuro/Psych: Orientation:Normal; Mood/Affect:Normal      A/P:  1. L nasal tip 5mm scaly papule   MOHS:   Location    After PGACAC discussed with patient, decision for Mohs surgery was made. Indication for Mohs was Location. Patient confirmed the site with Dr. Dias.  After anesthesia with LEC, the tumor was excised using standard Mohs technique in 1 stages(s).  CLEAR MARGINS OBTAINED and Final defect size was 1 cm.       REPAIR WITH BUROW'S FLAP: Because of the Because of the size and full thickness nature of the defect, an advancement flap was planned. After LEC anesthesia and prep, the Burow's triangles were excised. One Burow's triangle was displaced laterally along alar groove to hide incisions within skin relaxation lines. The advancement flap was raised by dissection in the deep subcutaneous plane. The remaining wound edges were undermined and hemostasis was obtained. The flap was advanced into the defect with care to avoid distortion and was sutured into place in a layered fashion using Vicryl and Fast Absorbing sutures. Postoperative size was 3.7 x 3.4 cm.  EBL minimal; complications none; wound care routine.  The patient was discharged in good condition and will return in one  week for wound evaluation.      BENIGN LESIONS DISCUSSED WITH PATIENT:  I discussed the specifics of tumor, prognosis, and genetics of benign lesions.  I explained that treatment of these lesions would be purely cosmetic and not medically neccessary.  I discussed with patient different removal options including excision, cautery and /or laser.      Nature and genetics of benign skin lesions dicussed with patient.  Signs and Symptoms of skin cancer discussed with patient.  Patient encouraged to perform monthly skin exams.  UV precautions reviewed with patient.  Skin care regimen reviewed with patient: Eliminate harsh soaps, i.e. Dial, zest, irsih spring; Mild soaps such as Cetaphil or Dove sensitive skin, avoid hot or cold showers, aggressive use of emollients including vanicream, cetaphil or cerave discussed with patient.    Risks of non-melanoma skin cancer discussed with patient   Return to clinic 6 months      Again, thank you for allowing me to participate in the care of your patient.        Sincerely,        Tato Dias MD

## 2018-01-26 ENCOUNTER — ALLIED HEALTH/NURSE VISIT (OUTPATIENT)
Dept: DERMATOLOGY | Facility: CLINIC | Age: 58
End: 2018-01-26
Payer: COMMERCIAL

## 2018-01-26 ENCOUNTER — TELEPHONE (OUTPATIENT)
Dept: DERMATOLOGY | Facility: CLINIC | Age: 58
End: 2018-01-26

## 2018-01-26 DIAGNOSIS — Z48.01 ENCOUNTER FOR CHANGE OR REMOVAL OF SURGICAL WOUND DRESSING: Primary | ICD-10-CM

## 2018-01-26 PROCEDURE — 99207 ZZC NO CHARGE NURSE ONLY: CPT

## 2018-01-26 NOTE — MR AVS SNAPSHOT
After Visit Summary   1/26/2018    Joana Arredondo    MRN: [id number]           Patient Information     Date Of Birth          1960        Visit Information        Provider Department      1/26/2018 11:30 AM [facility]        Care Instructions    WOUND CARE INSTRUCTIONS  for  ONE WEEK AFTER SURGERY          1) Leave flat bandage on your skin for one week after today s bandage change.  2) In one week when you remove the bandage, you may resume your regular skin care routine, including washing with mild soap and water, applying moisturizer, make-up and sunscreen.    3) If there are any open or bleeding areas at the incision/graft site you should begin to cover the area with a bandage daily as follows:    1) Clean and dry the area with plain tap water using a Q-tip or sterile gauze pad.  2) Apply Polysporin or Bacitracin ointment to the open area.  3) Cover the wound with a band-aid or a sterile non-stick gauze pad and micropore paper tape.             *Once the bandages are removed, the scar will be red and firm (especially in the lip/chin area). This is normal and will fade in time. It might take 6-12 months for this to happen.     *Massaging the area will help the scar soften and fade quicker. Begin to massage the area one month after the bandages have been removed. To massage apply pressure directly and firmly over the scar with the fingertips and move in a circular motion. Massage the area for a few minutes several times a day. Continue to massage the site for several months.    *Approximately 6-8 weeks after surgery it is not uncommon to see the formation of  tender pimple-like  bump along the scar. This is normal. As the scar continues to mature and the stitches underneath the skin begin to dissolve, this might occur. Do not pick or squeeze, this will resolve on it s own. Should one break open producing a small amount of drainage, apply Polysporin or Bacitracin  ointment a few times a day until the wound is completely healed.    *Numbness in the surgical area is expected. It might take 12-18 months for the feeling to return to normal. During this time sensations of itchiness, tingling and occasional sharp pains might be noted. These feelings are normal and will subside once the nerves have completely healed.         IN CASE OF EMERGENCY: Dr Dias [phone]     If you were seen in [place] call: [phone]    If you were seen in [place] call: [phone]              Follow-ups after your visit        Your next 10 appointments already scheduled     Jan 29, 2018  1:30 PM CST   Nurse Only with [facility]   [facility]    [address] [address] [phone]              Who to contact     If you have questions or need follow up information about today's clinic visit or your schedule please contact [facility] directly at [phone].  Normal or non-critical lab and imaging results will be communicated to you by [employer], letter or phone within 4 business days after the clinic has received the results. If you do not hear from us within 7 days, please contact the clinic through [employer] or phone. If you have a critical or abnormal lab result, we will notify you by phone as soon as possible.  Submit refill requests through [employer] or call your pharmacy and they will forward the refill request to us. Please allow 3 business days for your refill to be completed.          Additional Information About Your Visit        [employer] Information     [employer] gives you secure access to your electronic health record. If you see a primary care provider, you can also send messages to your care team and make appointments. If you have questions, please call your primary care clinic.  If you do not have a primary care provider, please call [phone] and they will assist you.        Care EveryWhere ID      This is your Care EveryWhere ID. This could be used by other organizations to access your [place] medical records  [id number]        Your Vitals Were     Last Period                   03/06/2011            Blood Pressure from Last 3 Encounters:   01/22/18 126/67   01/16/18 115/76   01/08/18 115/70    Weight from Last 3 Encounters:   01/02/18 106.1 kg (234 lb)   05/04/17 104.4 kg (230 lb 3.2 oz)   03/10/16 104.8 kg (231 lb)              Today, you had the following     No orders found for display         Today's Medication Changes          These changes are accurate as of 1/26/18 11:46 AM.  If you have any questions, ask your nurse or doctor.               These medicines have changed or have updated prescriptions.        Dose/Directions    HYDROcodone-acetaminophen  MG per tablet   Commonly known as:  NORCO   This may have changed:    - how much to take  - how to take this  - when to take this  - reasons to take this  - additional instructions   Used for:  Chronic low back pain without sciatica, unspecified back pain laterality        Take 1/2 - 1 tablet at night as needed for back pain.   Quantity:  40 tablet   Refills:  0                Primary Care Provider Fax #    Physician No Ref-Primary [phone]       No address on file        Equal Access to Services     NIRAJ WAYNE : Massimo Delaney, waawa garner, qaybta kaalmada adesean, samuel duarte. So [facility] [phone].    ATENCIÓN: Si habla español, tiene a flanagan disposición servicios gratuitos de asistencia lingüística. Llame al [phone].    We comply with applicable federal civil rights laws and [place] laws. We do not discriminate on the basis of race, color, national origin, age, disability, sex, sexual orientation, or gender identity.            Thank you!     Thank you for choosing [facility]  for your care. Our goal is always to provide you with excellent care. Hearing back from our  patients is one way we can continue to improve our services. Please take a few minutes to complete the written survey that you may receive in the mail after your visit with us. Thank you!             Your Updated Medication List - Protect others around you: Learn how to safely use, store and throw away your medicines at www.disposemymeds.org.          This list is accurate as of 1/26/18 11:46 AM.  Always use your most recent med list.                   Brand Name Dispense Instructions for use Diagnosis    acyclovir 5 % cream    ZOVIRAX    5 g    Apply  topically. Apply to affected area thinly five times daily at the first sign of symptoms.    Herpes simplex without mention of complication       CALCIUM + D PO      1 TABLET DAILY        cyclobenzaprine 10 MG tablet    FLEXERIL    30 tablet    Take 1 tablet (10 mg) by mouth 3 times daily as needed for muscle spasms        fluocinonide-emollient 0.05 % cream    LIDEX-E    30 g    Apply to affected area of elbows twice daily as needed.    Other eczema       HYDROcodone-acetaminophen  MG per tablet    NORCO    40 tablet    Take 1/2 - 1 tablet at night as needed for back pain.    Chronic low back pain without sciatica, unspecified back pain laterality       LORazepam 0.5 MG tablet    ATIVAN    20 tablet    Take 1-2 tablets (0.5-1 mg) by mouth every 8 hours as needed for anxiety    Anxiety       MULTIVITAMINS PO      1 daily        phentermine 37.5 MG capsule     30 capsule    Take 1/2 tablet daily in the AM.    Non morbid obesity due to excess calories       tolterodine 2 MG 24 hr capsule    DETROL LA    30 capsule    Take 1 capsule (2 mg) by mouth daily    Mixed stress and urge urinary incontinence       traMADol 50 MG tablet    ULTRAM    20 tablet    Take 1-2 tablets ( mg) by mouth every 6 hours as needed for pain        TYLENOL ARTHRITIS PAIN 650 MG CR tablet   Generic drug:  acetaminophen      Take 1,300 mg by mouth every 8 hours as needed    Chronic low  back pain

## 2018-01-26 NOTE — TELEPHONE ENCOUNTER
Reason for Call:  Other call back    Detailed comments: pt calling stating she had mohs on Monday. Her bandage is blood crusted and falling off. Would like to come in and have someone fix it. She has somewhere she needs to be this afternoon.     Phone Number Patient can be reached at: Home number on file [phone] (home)    Best Time: any     Can we leave a detailed message on this number? YES    Call taken on 1/26/2018 at 7:51 AM by Juliette Canela

## 2018-01-26 NOTE — PATIENT INSTRUCTIONS
WOUND CARE INSTRUCTIONS  for  ONE WEEK AFTER SURGERY          1) Leave flat bandage on your skin for one week after today s bandage change.  2) In one week when you remove the bandage, you may resume your regular skin care routine, including washing with mild soap and water, applying moisturizer, make-up and sunscreen.    3) If there are any open or bleeding areas at the incision/graft site you should begin to cover the area with a bandage daily as follows:    1) Clean and dry the area with plain tap water using a Q-tip or sterile gauze pad.  2) Apply Polysporin or Bacitracin ointment to the open area.  3) Cover the wound with a band-aid or a sterile non-stick gauze pad and micropore paper tape.             *Once the bandages are removed, the scar will be red and firm (especially in the lip/chin area). This is normal and will fade in time. It might take 6-12 months for this to happen.     *Massaging the area will help the scar soften and fade quicker. Begin to massage the area one month after the bandages have been removed. To massage apply pressure directly and firmly over the scar with the fingertips and move in a circular motion. Massage the area for a few minutes several times a day. Continue to massage the site for several months.    *Approximately 6-8 weeks after surgery it is not uncommon to see the formation of  tender pimple-like  bump along the scar. This is normal. As the scar continues to mature and the stitches underneath the skin begin to dissolve, this might occur. Do not pick or squeeze, this will resolve on it s own. Should one break open producing a small amount of drainage, apply Polysporin or Bacitracin ointment a few times a day until the wound is completely healed.    *Numbness in the surgical area is expected. It might take 12-18 months for the feeling to return to normal. During this time sensations of itchiness, tingling and occasional sharp pains might be noted. These feelings are normal  and will subside once the nerves have completely healed.         IN CASE OF EMERGENCY: Dr Dias [phone]     If you were seen in [place] call: [phone]    If you were seen in [place] call: [phone]

## 2018-01-26 NOTE — PROGRESS NOTES
Pt returned to clinic for post surgery 1 week follow up bandage change. Pt has no complaints, denies pain. Bandage removed nose, area cleansed with normal saline. Site is healing and wound edges approximating well. Reapplied new steri strips and paper tape.    Advised to watch for signs/sx of infection; spreading redness, drainage, odor, fever. Call or report promptly to clinic. Pt given written instructions and informed to rtc as needed. Patient verbalized understanding.     Elizabeth Wynne, CMA

## 2018-01-26 NOTE — TELEPHONE ENCOUNTER
Called patient back. She has what sounds like old drainage on dressing. Gauze is in place currently still. Concerned about taking off and finding a dressing that will stick and fit appropriately. Patient requesting to come in to be seen and make sure site is healing appropriately. Planning to come in for nurse visit @ 11:30 today.     Maria T STREETER   [facility] RN

## 2018-03-07 ENCOUNTER — OFFICE VISIT (OUTPATIENT)
Dept: FAMILY MEDICINE | Facility: CLINIC | Age: 58
End: 2018-03-07
Payer: COMMERCIAL

## 2018-03-07 VITALS
BODY MASS INDEX: 38.06 KG/M2 | WEIGHT: 236.8 LBS | RESPIRATION RATE: 16 BRPM | SYSTOLIC BLOOD PRESSURE: 122 MMHG | DIASTOLIC BLOOD PRESSURE: 81 MMHG | HEIGHT: 66 IN | HEART RATE: 87 BPM | TEMPERATURE: 98.3 F

## 2018-03-07 DIAGNOSIS — M54.9 MUSCULOSKELETAL BACK PAIN: ICD-10-CM

## 2018-03-07 DIAGNOSIS — M54.2 CERVICALGIA: Primary | ICD-10-CM

## 2018-03-07 PROCEDURE — 99215 OFFICE O/P EST HI 40 MIN: CPT | Performed by: FAMILY MEDICINE

## 2018-03-07 RX ORDER — COVID-19 ANTIGEN TEST
220 KIT MISCELLANEOUS 2 TIMES DAILY PRN
COMMUNITY
End: 2022-04-28

## 2018-03-07 RX ORDER — OMEGA-3 FATTY ACIDS/FISH OIL 300-1000MG
200 CAPSULE ORAL EVERY 4 HOURS PRN
COMMUNITY

## 2018-03-07 ASSESSMENT — PAIN SCALES - GENERAL: PAINLEVEL: MODERATE PAIN (5)

## 2018-03-07 NOTE — PATIENT INSTRUCTIONS
Thank you for choosing [facility].  You may be receiving a survey in the mail from [facility] regarding your visit today.  Please take a few minutes to complete and return the survey to let us know how we are doing.      Our Clinic hours are:  Mondays    7:20 am - 7 pm  Tues -  Fri  7:20 am - 5 pm    Clinic Phone: [phone]    The clinic lab opens at 7:30 am Mon - Fri and appointments are required.    [facility]. [phone]  Monday-Thursday 8 am - 7pm  Tues/Wed/Fri 8 am - 5:30 pm

## 2018-03-07 NOTE — PROGRESS NOTES
SUBJECTIVE:   Joana Arredondo is a 57 year old female who presents to clinic today for the following health issues:    Problem:   Chief Complaint   Patient presents with     Neck Pain     would like referral for physical therapy neck pain/spasms.   x 2-3 months.   Was seen in ED (1/16/18) for low back pain and was given a referral for physical therapy for her low back pain then.     Weight Problem     would like to discuss medication for weight loss     ADDITIONAL HPI: 57 year old female here for above issue.      ROS: 10 point review of systems negative except as per HPI.    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 Father      HEART DISEASE Father      had 2 heart attacks,68 for first one 79 on second     Cancer - colorectal Paternal Grandfather      DIABETES Sister      type 2       SOCIAL HISTORY:  Social History     Social History     Marital status:      Spouse name: N/A     Number of children: N/A     Years of education: N/A     Occupational History     Not on file.     Social History Main Topics     Smoking status: Former Smoker     Packs/day: 0.50     Years: 20.00     Types: Cigarettes     Quit date: 1/1/2016     Smokeless tobacco: Never Used      Comment: using E-cig     Alcohol use No     Drug use: No     Sexual activity: Yes     Partners: Male      Comment: husbandvasectomy     Other Topics Concern      Parent/Sibling W/ Cabg, Mi Or Angioplasty Before 65f 55m? No     Social History Narrative       MEDICATIONS:  Current Outpatient Prescriptions   Medication Sig Dispense Refill     naproxen sodium (ALEVE) 220 MG capsule Take 220 mg by mouth 2 times daily as needed       ibuprofen 200 MG capsule Take 200 mg by mouth every 4 hours as needed for fever       cyclobenzaprine (FLEXERIL) 10 MG tablet Take 1 tablet (10 mg) by mouth 3 times daily as needed for muscle spasms 30 tablet 0     traMADol (ULTRAM) 50 MG tablet Take 1-2 tablets ( mg) by mouth every 6 hours as needed for pain 20 tablet 0     HYDROcodone-acetaminophen (NORCO)  MG per tablet Take 1/2 - 1 tablet at night as needed for back pain. (Patient taking differently: Take 0.5-1 tablets by mouth every evening as needed for pain Take 1/2 - 1 tablet at night as needed for back pain.) 40 tablet 0     LORazepam (ATIVAN) 0.5 MG tablet Take 1-2 tablets (0.5-1 mg) by mouth every 8 hours as needed for anxiety 20 tablet 1     fluocinonide-emollient (LIDEX-E) 0.05 % cream Apply to affected area of elbows twice daily as needed. 30 g 3     acetaminophen (TYLENOL ARTHRITIS PAIN) 650 MG CR tablet Take 1,300 mg by mouth every 8 hours as needed        acyclovir (ZOVIRAX) 5 % cream Apply  topically. Apply to affected area thinly five times daily at the first sign of symptoms. 5 g 2     MULTIVITAMINS OR 1 daily        CALCIUM + D OR 1 TABLET DAILY       phentermine 37.5 MG capsule Take 1/2 tablet daily in the AM. (Patient not taking: Reported on 3/7/2018) 30 capsule 0     tolterodine (DETROL LA) 2 MG 24 hr capsule Take 1 capsule (2 mg) by mouth daily (Patient not taking: Reported on 3/7/2018) 30 capsule 11       ALLERGIES:     Allergies   Allergen Reactions     Nkda [No Known Drug Allergies]        Problem list, Medication list, Allergies, and Medical/Social/Surgical histories reviewed in [facility] and updated as appropriate.    OBJECTIVE:                                   "                  VITALS: /81 (BP Location: Right arm, Cuff Size: Adult Large)  Pulse 87  Temp 98.3  F (36.8  C) (Tympanic)  Resp 16  Ht 5' 6.25\" (1.683 m)  Wt 236 lb 12.8 oz (107.4 kg)  LMP 03/06/2011  BMI 37.93 kg/m2 Body mass index is 37.93 kg/(m^2).  GENERAL: Pleasant, well appearing female.     ASSESSMENT/PLAN:                                                    1. Cervicalgia  - PHYSICAL THERAPY REFERRAL    2. Musculoskeletal back pain  - PHYSICAL THERAPY REFERRAL    3. BMI 38.0-38.9,adult  Discussed weight loss options including medications. Also discussed low carb/keto diets. Ultimately she wanted to try contrave. Discussed use and side effects. Cautioned to not take hydrocodone while taking this.  - naltrexone-bupropion (CONTRAVE) 8-90 MG per 12 hr tablet; Take 1 tablet by mouth 2 times daily  Dispense: 60 tablet; Refill: 2    I spent 45 minutes with patient face-to-face, of which 50% or greater was spent in counseling and coordination of care in regards to weight loss counseling.  See plan above.       "

## 2018-03-07 NOTE — MR AVS SNAPSHOT
After Visit Summary   3/7/2018    Joana Arredondo    MRN: [id number]           Patient Information     Date Of Birth          1960        Visit Information        Provider Department      3/7/2018 3:00 PM Marybel Aguiar MD [facility]        Today's Diagnoses     Cervicalgia    -  1    Musculoskeletal back pain        BMI 38.0-38.9,adult          Care Instructions        Thank you for choosing [facility].  You may be receiving a [facility] the [facility].  Please take a few minutes to complete and return the survey to let us know how we are doing.      Our Clinic hours are:  Mondays    7:20 am - 7 pm  Tues -  Fri  7:20 am - 5 pm    Clinic Phone: [phone]    The clinic lab opens at 7:30 am Mon - Fri and appointments are required.    [facility]. [phone]  Monday-Thursday 8 am - 7pm  Tues/Wed/Fri 8 am - 5:30 pm                 Follow-ups after your visit        Additional Services     PHYSICAL THERAPY REFERRAL       *This therapy referral will be filtered to a centralized scheduling office at [facility] and the patient will receive a call to schedule an appointment at a [facility] location most convenient for them. *     [facility] provides Physical Therapy evaluation and treatment and many specialty services across the [facility] system.  If requesting a specialty program, please choose from the list below.    If you have not heard from the scheduling office within 2 business days, please call [phone] for all locations, with the exception of [place], please call [phone] and [facility], please call [phone]  Treatment: Evaluation & Treatment  Special Instructions/Modalities:   Special Programs: None    Please be aware that coverage of these services is subject to the terms and limitations of your health insurance plan.  Call member services at  "your health plan with any benefit or coverage questions.      **Note to Provider:  If you are referring outside of [place] for the therapy appointment, please list the name of the location in the \"special instructions\" above, print the referral and give to the patient to schedule the appointment.                  Your next 10 appointments already scheduled     May 07, 2018  9:15 AM CDT   [facility]    [address] [address]   [phone]              Who to contact     If you have questions or need follow up information about today's clinic visit or your schedule please contact [facility] directly at [phone].  Normal or non-critical lab and imaging results will be communicated to you by MyChart, letter or phone within 4 business days after the clinic has received the results. If you do not hear from us within 7 days, please contact the clinic through MyChart or phone. If you have a critical or abnormal lab result, we will notify you by phone as soon as possible.  Submit refill requests through [employer] or call your pharmacy and they will forward the refill request to us. Please allow 3 business days for your refill to be completed.          Additional Information About Your Visit        [employer] Information     [employer] gives you secure access to your electronic health record. If you see a primary care provider, you can also send messages to your care team and make appointments. If you have questions, please call your primary care clinic.  If you do not have a primary care provider, please call [phone] and they will assist you.        Care EveryWhere ID     This is your Care EveryWhere ID. This could be used by other organizations to access your [place] medical records  [id number]        Your Vitals Were     Pulse Temperature Respirations Height Last Period BMI (Body Mass " "Index)    87 98.3  F (36.8  C) (Tympanic) 16 5' 6.25\" (1.683 m) 03/06/2011 37.93 kg/m2       Blood Pressure from Last 3 Encounters:   03/07/18 122/81   01/22/18 126/67   01/16/18 115/76    Weight from Last 3 Encounters:   03/07/18 236 lb 12.8 oz (107.4 kg)   01/02/18 234 lb (106.1 kg)   05/04/17 230 lb 3.2 oz (104.4 kg)              We Performed the Following     PHYSICAL THERAPY REFERRAL          Today's Medication Changes          These changes are accurate as of 3/7/18  4:25 PM.  If you have any questions, ask your nurse or doctor.               Start taking these medicines.        Dose/Directions    naltrexone-bupropion 8-90 MG per 12 hr tablet   Commonly known as:  CONTRAVE   Used for:  BMI 38.0-38.9,adult   Started by:  Marybel Aguiar MD        Dose:  1 tablet   Take 1 tablet by mouth 2 times daily   Quantity:  60 tablet   Refills:  2         These medicines have changed or have updated prescriptions.        Dose/Directions    HYDROcodone-acetaminophen  MG per tablet   Commonly known as:  NORCO   This may have changed:    - how much to take  - how to take this  - when to take this  - reasons to take this  - additional instructions   Used for:  Chronic low back pain without sciatica, unspecified back pain laterality        Take 1/2 - 1 tablet at night as needed for back pain.   Quantity:  40 tablet   Refills:  0         Stop taking these medicines if you haven't already. Please contact your care team if you have questions.     phentermine 37.5 MG capsule   Stopped by:  Marybel Aguiar MD           traMADol 50 MG tablet   Commonly known as:  ULTRAM   Stopped by:  Marybel Aguiar MD                Where to get your medicines      These medications were sent to [facility] [place], [place] - [address]  [address], [facility] [address]     Phone:  [phone]     naltrexone-bupropion 8-90 MG per 12 hr tablet                " Primary Care Provider Office Phone # Fax #    [facility] Kaylah Aguiar [phone] [address] [facility] [address]        Equal Access to Services     FAHADMARVIN ROSANA : Hadlaura simms ku dilmao Soestefaniaali, waaxda luqadaha, qaybta kaalmada marina, samuel de la pazvanessa felicia. So [facility] [phone].    ATENCIÓN: Si habla español, tiene a flanagan disposición servicios gratuitos de asistencia lingüística. Llame al [phone].    We comply with applicable federal civil rights laws and [place] laws. We do not discriminate on the basis of race, color, national origin, age, disability, sex, sexual orientation, or gender identity.            Thank you!     Thank you for choosing [facility]  for your care. Our goal is always to provide you with excellent care. Hearing back from our patients is one way we can continue to improve our services. Please take a few minutes to complete the written survey that you may receive in the mail after your visit with us. Thank you!             Your Updated Medication List - Protect others around you: Learn how to safely use, store and throw away your medicines at www.disposemymeds.org.          This list is accurate as of 3/7/18  4:25 PM.  Always use your most recent med list.                   Brand Name Dispense Instructions for use Diagnosis    acyclovir 5 % cream    ZOVIRAX    5 g    Apply  topically. Apply to affected area thinly five times daily at the first sign of symptoms.    Herpes simplex without mention of complication       ALEVE 220 MG capsule   Generic drug:  naproxen sodium      Take 220 mg by mouth 2 times daily as needed        CALCIUM + D PO      1 TABLET DAILY        cyclobenzaprine 10 MG tablet    FLEXERIL    30 tablet    Take 1 tablet (10 mg) by mouth 3 times daily as needed for muscle spasms        fluocinonide-emollient 0.05 % cream    LIDEX-E    30 g    Apply to affected area of elbows twice daily as needed.     Other eczema       HYDROcodone-acetaminophen  MG per tablet    NORCO    40 tablet    Take 1/2 - 1 tablet at night as needed for back pain.    Chronic low back pain without sciatica, unspecified back pain laterality       ibuprofen 200 MG capsule      Take 200 mg by mouth every 4 hours as needed for fever        LORazepam 0.5 MG tablet    ATIVAN    20 tablet    Take 1-2 tablets (0.5-1 mg) by mouth every 8 hours as needed for anxiety    Anxiety       MULTIVITAMINS PO      1 daily        naltrexone-bupropion 8-90 MG per 12 hr tablet    CONTRAVE    60 tablet    Take 1 tablet by mouth 2 times daily    BMI 38.0-38.9,adult       tolterodine 2 MG 24 hr capsule    DETROL LA    30 capsule    Take 1 capsule (2 mg) by mouth daily    Mixed stress and urge urinary incontinence       TYLENOL ARTHRITIS PAIN 650 MG CR tablet   Generic drug:  acetaminophen      Take 1,300 mg by mouth every 8 hours as needed    Chronic low back pain

## 2018-03-15 ENCOUNTER — TELEPHONE (OUTPATIENT)
Dept: FAMILY MEDICINE | Facility: CLINIC | Age: 58
End: 2018-03-15

## 2018-03-15 NOTE — TELEPHONE ENCOUNTER
Patient came in today wondering the status of a prior auth for Contrave. So far has picked up to weeks worth at the cash price but she is really hoping to get insurance to cover at least some of it.  So pharmacy is trying to follow up, is there a  PA in process?      Prior Authorization Retail Medication Request    Medication/Dose:   ICD code (if different than what is on RX):  Previously Tried and Failed:  Rationale:    Insurance Name: [employer]  Insurance ID: [id number]      Pharmacy Information (if different than what is on RX)  Name:  Phone:

## 2018-03-16 NOTE — TELEPHONE ENCOUNTER
Previous message about PA was sent to OOP.  Waiting for response.    Thank you  Rosita CAMPBELL RN

## 2018-03-19 NOTE — TELEPHONE ENCOUNTER
"Prior Authorization Retail Medication Request    Medication/Dose: naltrexone-bupropion (CONTRAVE) 8-90 MG per 12 hr tablet  ICD code (if different than what is on RX):  Previously Tried and Failed: phentermine 37.5 previously used  Rationale: 5/2017    She has lost weight in the past with [employer] and is restarting the weekly meetings, but would like something to \"jumpstart' her progress.  She has used phentermine in the past with good results, knows this cannot be used longterm, would like to discuss other medical treatment options.    Insurance Name: [employer]  Insurance ID:       Pharmacy Information (if different than what is on RX)  Name: [facility]   Phone: [phone]    "

## 2018-03-21 ENCOUNTER — HOSPITAL ENCOUNTER (OUTPATIENT)
Dept: PHYSICAL THERAPY | Facility: CLINIC | Age: 58
Setting detail: THERAPIES SERIES
End: 2018-03-21
Attending: FAMILY MEDICINE
Payer: COMMERCIAL

## 2018-03-21 PROCEDURE — 97110 THERAPEUTIC EXERCISES: CPT | Mod: GP | Performed by: PHYSICAL THERAPIST

## 2018-03-21 PROCEDURE — 40000718 ZZHC STATISTIC PT DEPARTMENT ORTHO VISIT: Performed by: PHYSICAL THERAPIST

## 2018-03-21 PROCEDURE — 97161 PT EVAL LOW COMPLEX 20 MIN: CPT | Mod: GP | Performed by: PHYSICAL THERAPIST

## 2018-03-21 NOTE — TELEPHONE ENCOUNTER
Central Prior Authorization Team   Phone: [phone]    PA Initiation    Medication:   Insurance Company: [employer] - Phone [phone] Fax [phone]  Pharmacy Filling the Rx: [place], [place] - [address]  Filling Pharmacy Phone: [phone]  Filling Pharmacy Fax: [phone]  Start Date: 3/21/2018

## 2018-03-26 NOTE — TELEPHONE ENCOUNTER
Prior Authorization Approval    Authorization Effective Date: 3/24/2018  Authorization Expiration Date: 7/24/2018  Medication: CONTRAVE-APPROVED  Approved Dose/Quantity:    Reference #:     Insurance Company: [employer] - Phone [phone] Fax [phone]  Expected CoPay: $41.74     CoPay Card Available:      Foundation Assistance Needed:    Which Pharmacy is filling the prescription (Not needed for infusion/clinic administered): [facility] [place], [place] - [address]  Pharmacy Notified: Yes  Patient Notified: Yes

## 2018-04-02 ENCOUNTER — HOSPITAL ENCOUNTER (OUTPATIENT)
Dept: PHYSICAL THERAPY | Facility: CLINIC | Age: 58
Setting detail: THERAPIES SERIES
End: 2018-04-02
Attending: FAMILY MEDICINE
Payer: COMMERCIAL

## 2018-04-02 PROCEDURE — 97110 THERAPEUTIC EXERCISES: CPT | Mod: GP | Performed by: PHYSICAL THERAPIST

## 2018-04-02 PROCEDURE — 40000718 ZZHC STATISTIC PT DEPARTMENT ORTHO VISIT: Performed by: PHYSICAL THERAPIST

## 2018-04-02 PROCEDURE — 97140 MANUAL THERAPY 1/> REGIONS: CPT | Mod: GP | Performed by: PHYSICAL THERAPIST

## 2018-04-02 NOTE — ADDENDUM NOTE
Encounter addended by: Ilda Christensen, PT on: 4/2/2018 10:29 AM<BR>     Actions taken: Sign clinical note, Flowsheet accepted

## 2018-04-02 NOTE — PROGRESS NOTES
03/21/18 0600   General Information   Type of Visit Initial OP Ortho PT Evaluation   Start of Care Date 03/21/18   Referring Physician Marybel Aguiar MD   Patient/Family Goals Statement To reduce pain; try to get rid of it   Orders Evaluate and Treat   Date of Order 03/07/18   Insurance Type Other   Insurance Comments/Visits Authorized [employer] 365/365   Medical Diagnosis Cervicalgia; musculoskeletal back pain   General Information Comments PmHX left shoulder surgery in 2006 or 2007       Present No   Body Part(s)   Body Part(s) Cervical Spine;Lumbar Spine/SI   Presentation and Etiology   Pertinent history of current problem (include personal factors and/or comorbidities that impact the POC) Pt reports: she has had low back pain for years.  In January the low back worsened, so bad that she could only lie on the ground.  More recently the upper back has been spasming.  Massage usually helps, but not this time.   Flexeril does help to get to sleep.     Impairments A. Pain;E. Decreased flexibility;N. Headaches;R. Other   Impairment comment Spasms   Functional Limitations perform activities of daily living;perform required work activities;perform desired leisure / sports activities   Symptom Location Between shoulder blades, into neck;    How/Where did it occur From insidious onset   Onset date of current episode/exacerbation 01/17/18  (Chronic; worsening in January)   Chronicity Chronic   Pain rating (0-10 point scale) Best (/10);Worst (/10)   Best (/10) 3   Worst (/10) 7   Pain quality C. Aching;G. Cramping   Frequency of pain/symptoms A. Constant   Pain/symptoms are: The same all the time   Pain/symptoms exacerbated by F. Nothing   Pain/symptoms eased by H. Cold   Progression of symptoms since onset: Worsened   Current / Previous Interventions   Diagnostic Tests: X-ray   X-ray Results Results   X-ray results 1/16/18 XR lumbar spine: IMPRESSION: Grade 1 anterolisthesis of L4 on L5.  Suspicious for chronic pars defect at this level. Degenerative facet changes at L4 and L5. Otherwise negative.   Prior Level of Function   Prior Level of Function-Mobility Independent   Prior Level of Function-ADLs Independent   Current Level of Function   Current Community Support Family/friend caregiver   Patient role/employment history A. 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 No   Have you fallen and had an injury in the past year? No   Is patient a fall risk? No   System Outcome Measures   Outcome Measures Low Back Pain (see Oswestry and Dave)   Lumbar Spine/SI Objective Findings   Posture Hyper lumbar lordosis with hinge at L5-S1; increased thoracic kyphosis; raza rounded shoulders   Flexion % fingertips to toes   Extension % pain free   Right Side Bending % pain free   Left Side Bending % pain free   Hip Abduction Strength Raza=3/5   Lumbar/Hip/Knee/Foot Strength Comments Myotomes=5/5   SLR 90 deg raza   Cervical Spine   Cervical Flexion ROM 30 deg pain free   Cervical Extension ROM 50 deg pain free   Cervical Right Side Bending ROM 10 deg   Cervical Left Side Bending ROM 10 deg   Cervical Right Rotation ROM 60 deg with right sided cervical pain   Cervical Left Rotation ROM 65 deg   Planned Therapy Interventions   Planned Therapy Interventions ADL retraining;balance training;gait training;joint mobilization;manual therapy;motor coordination training;neuromuscular re-education;ROM;strengthening;stretching   Planned Modality Interventions   Planned Modality Interventions Cryotherapy;TENS;Traction   Clinical Impression   Criteria for Skilled Therapeutic Interventions Met yes, treatment indicated   PT Diagnosis Cervical and lumbar pain   Influenced by the following impairments Pain; weakness;  impaired ROM; impaired posture   Functional limitations due to impairments Pain and difficulty performing ADL's   Clinical Presentation Stable/Uncomplicated   Clinical Presentation Rationale (+) motivation; age; overall health   Clinical Decision Making (Complexity) Low complexity   Therapy Frequency other (see comments)   Predicted Duration of Therapy Intervention (days/wks) 1x every other week   Risk & Benefits of therapy have been explained Yes   Patient, Family & other staff in agreement with plan of care Yes   Clinical Impression Comments Joana is a pleasant 58 yo female who presents today with cervical > lumbar pain.  She will benefit from skilled PT to address the above impairments and functional limitations.   Education Assessment   Preferred Learning Style Listening;Demonstration;Pictures/video   Barriers to Learning No barriers   ORTHO GOALS   PT Ortho Eval Goals 1;2;3   Ortho Goal 1   Goal Description Patient will have >=70 degrees of bilateral cervical AROM to allow for safe driving.   Target Date 05/14/18   Ortho Goal 2   Goal Description Patient will be able to perform her ADL's without upper back spasming.   Target Date 05/14/18   Ortho Goal 3   Goal Description Patient will be independent with her HEP to reduce future occurrence of pain and disability.   Target Date 04/02/18       Ilda Christensen, PT, DPT  Doctor of Physical Therapy #[id number]  [facility]  [phone]  [email or web address]

## 2018-04-05 DIAGNOSIS — G89.29 CHRONIC LOW BACK PAIN WITHOUT SCIATICA, UNSPECIFIED BACK PAIN LATERALITY: ICD-10-CM

## 2018-04-05 DIAGNOSIS — M54.50 CHRONIC LOW BACK PAIN WITHOUT SCIATICA, UNSPECIFIED BACK PAIN LATERALITY: ICD-10-CM

## 2018-04-06 RX ORDER — HYDROCODONE BITARTRATE AND ACETAMINOPHEN 10; 325 MG/1; MG/1
TABLET ORAL
Qty: 40 TABLET | Refills: 0 | OUTPATIENT
Start: 2018-04-06

## 2018-04-06 NOTE — TELEPHONE ENCOUNTER
No refills of hydrocodone since she is on contrave and should not be using narcotics while taking this.

## 2018-04-12 ENCOUNTER — TELEPHONE (OUTPATIENT)
Dept: FAMILY MEDICINE | Facility: CLINIC | Age: 58
End: 2018-04-12

## 2018-04-12 DIAGNOSIS — G89.29 CHRONIC LOW BACK PAIN WITHOUT SCIATICA, UNSPECIFIED BACK PAIN LATERALITY: ICD-10-CM

## 2018-04-12 DIAGNOSIS — M54.50 CHRONIC LOW BACK PAIN WITHOUT SCIATICA, UNSPECIFIED BACK PAIN LATERALITY: ICD-10-CM

## 2018-04-12 NOTE — TELEPHONE ENCOUNTER
Requested Prescriptions   Pending Prescriptions Disp Refills     HYDROcodone-acetaminophen (NORCO)  MG per tablet  Last Written Prescription Date:  01/02/2018  Last Fill Quantity: 40,  # refills: 0   Last office visit: 3/7/2018 with prescribing provider:  TIFFANIE Aguiar   Future Office Visit:   Next 5 appointments (look out 90 days)     May 07, 2018  9:15 AM CDT   [facility] Visit with Tato Dias MD   [facility]    [address] [place] [id number]   [phone]                  40 tablet 0     Sig: Take 1/2 - 1 tablet at night as needed for back pain.    There is no refill protocol information for this order        Bassem BEJARANO (R)

## 2018-04-14 ENCOUNTER — MYC REFILL (OUTPATIENT)
Dept: FAMILY MEDICINE | Facility: CLINIC | Age: 58
End: 2018-04-14

## 2018-04-14 DIAGNOSIS — G89.29 CHRONIC LOW BACK PAIN WITHOUT SCIATICA, UNSPECIFIED BACK PAIN LATERALITY: ICD-10-CM

## 2018-04-14 DIAGNOSIS — M54.50 CHRONIC LOW BACK PAIN WITHOUT SCIATICA, UNSPECIFIED BACK PAIN LATERALITY: ICD-10-CM

## 2018-04-16 NOTE — TELEPHONE ENCOUNTER
Message from [facility]:  Original authorizing provider: MD Charlotte Khanann Maria Elena would like a refill of the following medications:  HYDROcodone-acetaminophen (NORCO)  MG per tablet [Conor Escoto MD]    Preferred pharmacy: [facility] [id number]    Comment:  Please send this renewal to my new regular physician, Dr Aguiar. Dr Escoto provided a refill when I was in between physicians, after Dr Montano retired.

## 2018-04-18 RX ORDER — HYDROCODONE BITARTRATE AND ACETAMINOPHEN 10; 325 MG/1; MG/1
TABLET ORAL
Qty: 40 TABLET | Refills: 0 | OUTPATIENT
Start: 2018-04-18

## 2018-04-18 NOTE — TELEPHONE ENCOUNTER
Please see previous phone note. She is on Contrave and should not be taking narcotics while on this.

## 2018-04-18 NOTE — TELEPHONE ENCOUNTER
She called about this earlier. She should not be combining this with Contrave. Please be sure that she has stopped the Contrave while taking narcotics.

## 2018-04-20 RX ORDER — HYDROCODONE BITARTRATE AND ACETAMINOPHEN 10; 325 MG/1; MG/1
.5-1 TABLET ORAL
Qty: 30 TABLET | Refills: 0 | Status: SHIPPED | OUTPATIENT
Start: 2018-04-20 | End: 2020-04-09

## 2018-04-20 RX ORDER — METHOCARBAMOL 500 MG/1
1000 TABLET, FILM COATED ORAL 3 TIMES DAILY PRN
Qty: 30 TABLET | Refills: 1 | Status: SHIPPED | OUTPATIENT
Start: 2018-04-20 | End: 2018-06-06

## 2018-04-20 NOTE — TELEPHONE ENCOUNTER
Trial Robaxin instead of Flexeril.  This may make her less drowsy.  May also improve pain control some.  As it has dual benefit of both musculoskeletal pain relief and muscle relaxing properties.  Hydrocodone printed. Follow-up if not improving or if worsening.

## 2018-04-20 NOTE — TELEPHONE ENCOUNTER
Rx for Norco Signed and walked to our pharmacy, also called the patient to inform her and gave her the messag about Robaxin.  Esme Garvey  [facility]

## 2018-04-20 NOTE — TELEPHONE ENCOUNTER
Patient is doing PT and still having issues.  Patient has stopped taking the Contrave, patient reports she did not like the way it made her feel. Patient states she has flexeril but it makes her sleepy, so she will be able to take it on the weekend.  Patient would like to try something else.  Will send to provider for review and advise.    Thank you  Rosita CAMPBELL RN

## 2018-06-06 ENCOUNTER — TELEPHONE (OUTPATIENT)
Dept: FAMILY MEDICINE | Facility: CLINIC | Age: 58
End: 2018-06-06

## 2018-06-06 DIAGNOSIS — M54.50 CHRONIC LOW BACK PAIN WITHOUT SCIATICA, UNSPECIFIED BACK PAIN LATERALITY: ICD-10-CM

## 2018-06-06 DIAGNOSIS — G89.29 CHRONIC LOW BACK PAIN WITHOUT SCIATICA, UNSPECIFIED BACK PAIN LATERALITY: ICD-10-CM

## 2018-06-06 RX ORDER — METHOCARBAMOL 750 MG/1
750-1125 TABLET, FILM COATED ORAL 3 TIMES DAILY PRN
Qty: 45 TABLET | Refills: 1 | Status: SHIPPED | OUTPATIENT
Start: 2018-06-06 | End: 2020-08-12

## 2018-06-06 NOTE — TELEPHONE ENCOUNTER
Patient is trying to refill her Methocarbamol 500mg tablets from the rx that was written 4/20/18, unfortunately that drug is on backorder and there is no estimated date for it to be back, is there another med you want to write a new rx for or we have the 750mg tabs if you want to send an rx for that strength.     Thank You-  Olga Vergara, [facility] [place]

## 2018-06-06 NOTE — ADDENDUM NOTE
Encounter addended by: Ilda Christensen, PT on: 6/6/2018  9:46 AM<BR>     Actions taken: Sign clinical note, Episode resolved

## 2018-06-06 NOTE — PROGRESS NOTES
Outpatient Physical Therapy Discharge Note     Patient: Joana Arredondo  : 1960    Beginning/End Dates of Reporting Period:  3/21/18 to 2018    Referring Provider: Marybel Aguiar MD    Therapy Diagnosis: Cervicalgia; musculoskeletal back pain     Client Self Report: Stretching has really really helped the neck; low back is still alright, no soreness.    Objective Measurements:       Objective Measure: Cervical AROM   Details: Right=80 deg; Left=74 deg         Goals:  Goal Identifier     Goal Description Patient will have >=70 degrees of bilateral cervical AROM to allow for safe driving.   Target Date 18   Date Met  18   Progress:     Goal Identifier     Goal Description Patient will be able to perform her ADL's without upper back spasming.   Target Date 18   Date Met  18   Progress:     Goal Identifier     Goal Description Patient will be independent with her HEP to reduce future occurrence of pain and disability.   Target Date 18   Date Met  18   Progress:       Progress Toward Goals:   Progress this reporting period: Joana attended 2 PT sessions to address her lumbar and cervical pain. Sessions were primarily focused on neck pain.  Joana made good progress with cervical AROM and pain reduction.  She is independent with her long term HEP to continue self managing symptoms.      Plan:  Discharge from therapy.    Discharge:    Reason for Discharge: Working independently to achieve final goals    Equipment Issued: Opal    Discharge Plan: Patient to continue home program.    Ilda Christensen, PT, DPT  Doctor of Physical Therapy #[id number]  [facility]  [phone]  [email or web address]

## 2018-06-25 DIAGNOSIS — G89.29 CHRONIC LOW BACK PAIN WITHOUT SCIATICA, UNSPECIFIED BACK PAIN LATERALITY: ICD-10-CM

## 2018-06-25 DIAGNOSIS — M54.50 CHRONIC LOW BACK PAIN WITHOUT SCIATICA, UNSPECIFIED BACK PAIN LATERALITY: ICD-10-CM

## 2018-06-25 RX ORDER — HYDROCODONE BITARTRATE AND ACETAMINOPHEN 10; 325 MG/1; MG/1
.5-1 TABLET ORAL
Qty: 30 TABLET | Refills: 0 | Status: CANCELLED | OUTPATIENT
Start: 2018-06-25

## 2018-06-25 NOTE — TELEPHONE ENCOUNTER
Requested Prescriptions   Pending Prescriptions Disp Refills     HYDROcodone-acetaminophen (NORCO)  MG per tablet 30 tablet 0     Sig: Take 0.5-1 tablets by mouth every evening as needed for pain Take 1/2 - 1 tablet at night as needed for back pain.    There is no refill protocol information for this order        Routing refill request to provider for review/approval because:  Drug not on the [facility] refill protocol     Mary Ann WOLFE Rn

## 2018-08-17 ENCOUNTER — TELEPHONE (OUTPATIENT)
Dept: FAMILY MEDICINE | Facility: CLINIC | Age: 58
End: 2018-08-17

## 2018-08-17 NOTE — TELEPHONE ENCOUNTER
Reason for call:  Patient reporting a symptom    Symptom or request: sushil rt middle finger x2 weeks ago, it swelled up and bruised and now the bruising and swelling went down but patient would like advise if she should get it xray or not?    Duration (how long have symptoms been present): x2 weeks    Have you been treated for this before? No    Additional comments: finger bends but not all the way    Phone Number patient can be reached at:  Cell number on file:    Telephone Information:   Mobile [phone]       Best Time:  any    Can we leave a detailed message on this number:  YES    Call taken on 8/17/2018 at 1:10 PM by Sunshine Caballero

## 2018-08-17 NOTE — TELEPHONE ENCOUNTER
"S-(situation): jammed middle finger on right hand     B-(background): 2 weeks ago    A-(assessment): The finger is on her dominant hand. She thinks it is bruised, still can bend it. She is \"not holding back with it, seems fine\". Patient rates the pain as a 3/10 and \"uncomfortable\". She denies, obvious deformity or dislocation, severe pain, fever, loose nail, puncture wound bleeding, severe swelling, draining, blood under nail, or discolored nail.      R-(recommendations): ice, soak in Epson salt, ibuprofen, immobilize the digit by kike taping. This RN recommended that she watch the digit for another 2 weeks as jammed fingers can take 4 weeks to heal.    Ira JIMENEZ RN      "

## 2018-08-20 ENCOUNTER — TELEPHONE (OUTPATIENT)
Dept: FAMILY MEDICINE | Facility: CLINIC | Age: 58
End: 2018-08-20

## 2018-08-20 DIAGNOSIS — N39.46 MIXED STRESS AND URGE URINARY INCONTINENCE: ICD-10-CM

## 2018-08-20 DIAGNOSIS — F41.9 ANXIETY: ICD-10-CM

## 2018-08-20 RX ORDER — LORAZEPAM 0.5 MG/1
.5-1 TABLET ORAL EVERY 8 HOURS PRN
Qty: 20 TABLET | Refills: 1 | Status: CANCELLED | OUTPATIENT
Start: 2018-08-20

## 2018-08-21 NOTE — TELEPHONE ENCOUNTER
"Requested Prescriptions   Pending Prescriptions Disp Refills     tolterodine (DETROL LA) 2 MG 24 hr capsule  Last Written Prescription Date: 5/4/2017  Last Fill Quantity: 30,  # refills: 11   Last office visit: 3/7/2018 with prescribing provider:  [place]    Future Office Visit:     30 capsule 11     Sig: Take 1 capsule (2 mg) by mouth daily    Muscarinic Antagonists (Urinary Incontinence Agents) Passed    8/20/2018  6:03 PM       Passed - Recent (12 mo) or future (30 days) visit within the authorizing provider's specialty    Patient had office visit in the last 12 months or has a visit in the next 30 days with authorizing provider or within the authorizing provider's specialty.  See \"Patient Info\" tab in inbasket, or \"Choose Columns\" in Meds & Orders section of the refill encounter.           Passed - Patient does not have a diagnosis of glaucoma on the problem list    If glaucoma diagnosis is new, refer refill to physician.         Passed - Patient is 18 years of age or older          "

## 2018-08-21 NOTE — TELEPHONE ENCOUNTER
Requested Prescriptions   Pending Prescriptions Disp Refills     LORazepam (ATIVAN) 0.5 MG tablet 20 tablet 1     Sig: Take 1-2 tablets (0.5-1 mg) by mouth every 8 hours as needed for anxiety    Last Written Prescription Date:  8/10/2017  Last Fill Quantity: 20,   # refills: 1  Last Office Visit: 3/7/2018 with [place]   Future Office visit:       Routing refill request to provider for review/approval because:  Drug not on the [facility] or [facility] refill protocol or controlled substance

## 2018-08-22 RX ORDER — TOLTERODINE 2 MG/1
2 CAPSULE, EXTENDED RELEASE ORAL DAILY
Qty: 30 CAPSULE | Refills: 11 | Status: SHIPPED | OUTPATIENT
Start: 2018-08-22 | End: 2020-08-12

## 2018-08-22 NOTE — TELEPHONE ENCOUNTER
Left message for patient to return call to clinic.  Needs OV.  This is a controlled substance so needs OV.  Last prescriber was Dr. Montano.  She has seen Dr. Aguiar since however anxiety not discussed.  Lili WOLFE RN

## 2018-08-22 NOTE — TELEPHONE ENCOUNTER
Routing refill request to provider for review/approval because:  Last filled by Dr. Montano. Patient has been seen in clinic/established care with Dr. OBED Aguiar but not for this diagnosis. OK to continue?     Olive VELAZQUEZ RN

## 2018-08-22 NOTE — TELEPHONE ENCOUNTER
She reported that she was not taking this back when we saw her in March. Which is why it was not refilled. Was that a mistake or does she only take it intermittently?

## 2018-08-23 NOTE — TELEPHONE ENCOUNTER
Left message for the patient to call the clinic.  Clarification on if she is taking the detrol. Ansley GOLDBERG RN

## 2018-08-24 NOTE — TELEPHONE ENCOUNTER
Emma prescription signed. Please advise her any time she starts up an old medication just to give us a heads up so we can keep her medlist accurate.  Otherwise we are unable to correctly track her medications or refills.

## 2018-08-24 NOTE — TELEPHONE ENCOUNTER
Patient states she did start up the medication again and would like to continue taking the med daily.    Rosita CAMPBELL RN

## 2018-09-14 ENCOUNTER — RADIANT APPOINTMENT (OUTPATIENT)
Dept: GENERAL RADIOLOGY | Facility: CLINIC | Age: 58
End: 2018-09-14
Attending: FAMILY MEDICINE
Payer: COMMERCIAL

## 2018-09-14 ENCOUNTER — OFFICE VISIT (OUTPATIENT)
Dept: FAMILY MEDICINE | Facility: CLINIC | Age: 58
End: 2018-09-14
Payer: COMMERCIAL

## 2018-09-14 VITALS
DIASTOLIC BLOOD PRESSURE: 60 MMHG | HEIGHT: 67 IN | HEART RATE: 81 BPM | OXYGEN SATURATION: 97 % | TEMPERATURE: 97.5 F | SYSTOLIC BLOOD PRESSURE: 110 MMHG | WEIGHT: 219.4 LBS | BODY MASS INDEX: 34.44 KG/M2 | RESPIRATION RATE: 16 BRPM

## 2018-09-14 DIAGNOSIS — R73.9 HYPERGLYCEMIA: ICD-10-CM

## 2018-09-14 DIAGNOSIS — Z13.220 LIPID SCREENING: ICD-10-CM

## 2018-09-14 DIAGNOSIS — S69.91XA INJURY OF RIGHT MIDDLE FINGER, INITIAL ENCOUNTER: Primary | ICD-10-CM

## 2018-09-14 DIAGNOSIS — S69.91XA INJURY OF RIGHT MIDDLE FINGER, INITIAL ENCOUNTER: ICD-10-CM

## 2018-09-14 DIAGNOSIS — Z23 NEED FOR PROPHYLACTIC VACCINATION AND INOCULATION AGAINST INFLUENZA: ICD-10-CM

## 2018-09-14 DIAGNOSIS — F41.9 ANXIETY: ICD-10-CM

## 2018-09-14 LAB
CHOLEST SERPL-MCNC: 204 MG/DL
HBA1C MFR BLD: 5.5 % (ref 0–5.6)
HDLC SERPL-MCNC: 43 MG/DL
LDLC SERPL CALC-MCNC: 131 MG/DL
NONHDLC SERPL-MCNC: 161 MG/DL
TRIGL SERPL-MCNC: 151 MG/DL

## 2018-09-14 PROCEDURE — 80061 LIPID PANEL: CPT | Performed by: FAMILY MEDICINE

## 2018-09-14 PROCEDURE — 83036 HEMOGLOBIN GLYCOSYLATED A1C: CPT | Performed by: FAMILY MEDICINE

## 2018-09-14 PROCEDURE — 73140 X-RAY EXAM OF FINGER(S): CPT | Mod: LT

## 2018-09-14 PROCEDURE — 90686 IIV4 VACC NO PRSV 0.5 ML IM: CPT | Performed by: FAMILY MEDICINE

## 2018-09-14 PROCEDURE — 36415 COLL VENOUS BLD VENIPUNCTURE: CPT | Performed by: FAMILY MEDICINE

## 2018-09-14 PROCEDURE — 99214 OFFICE O/P EST MOD 30 MIN: CPT | Mod: 25 | Performed by: FAMILY MEDICINE

## 2018-09-14 PROCEDURE — 90471 IMMUNIZATION ADMIN: CPT | Performed by: FAMILY MEDICINE

## 2018-09-14 RX ORDER — LORAZEPAM 0.5 MG/1
.5-1 TABLET ORAL EVERY 8 HOURS PRN
Qty: 20 TABLET | Refills: 1 | Status: SHIPPED | OUTPATIENT
Start: 2018-09-14 | End: 2020-02-19

## 2018-09-14 ASSESSMENT — PAIN SCALES - GENERAL: PAINLEVEL: MILD PAIN (2)

## 2018-09-14 ASSESSMENT — ANXIETY QUESTIONNAIRES
2. NOT BEING ABLE TO STOP OR CONTROL WORRYING: NOT AT ALL
5. BEING SO RESTLESS THAT IT IS HARD TO SIT STILL: NOT AT ALL
GAD7 TOTAL SCORE: 1
6. BECOMING EASILY ANNOYED OR IRRITABLE: NOT AT ALL
IF YOU CHECKED OFF ANY PROBLEMS ON THIS QUESTIONNAIRE, HOW DIFFICULT HAVE THESE PROBLEMS MADE IT FOR YOU TO DO YOUR WORK, TAKE CARE OF THINGS AT HOME, OR GET ALONG WITH OTHER PEOPLE: NOT DIFFICULT AT ALL
7. FEELING AFRAID AS IF SOMETHING AWFUL MIGHT HAPPEN: NOT AT ALL
3. WORRYING TOO MUCH ABOUT DIFFERENT THINGS: NOT AT ALL
1. FEELING NERVOUS, ANXIOUS, OR ON EDGE: SEVERAL DAYS

## 2018-09-14 ASSESSMENT — PATIENT HEALTH QUESTIONNAIRE - PHQ9: 5. POOR APPETITE OR OVEREATING: NOT AT ALL

## 2018-09-14 NOTE — PROGRESS NOTES
SUBJECTIVE:   Joana Arredondo is a 57 year old female who presents to clinic today for the following health issues:      Problem:   Chief Complaint   Patient presents with     Finger     jammed finger 5-6 weeks ago and having trouble bending middle finger right hand.     Patient Request     would like to get a A1c and Cholesterol checked. states she has a family hx of diabetes. Has been told in the past she has prediabetes.     Anxiety     med recheck and refill     ADDITIONAL HPI: 57 year old female here for above issue.      ROS: 10 point review of systems negative except as per HPI.    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 Father      HEART DISEASE Father      had 2 heart attacks,68 for first one 79 on second     Cancer - colorectal Paternal Grandfather      Diabetes Sister      type 2       SOCIAL HISTORY:  Social History     Social History     Marital status:      Spouse name: N/A     Number of children: N/A     Years of education: N/A     Occupational History     Not on file.     Social History Main Topics     Smoking status: Former Smoker     Packs/day: 0.50     Years: 20.00     Types: Cigarettes     Quit date: 1/1/2016     Smokeless tobacco: Never Used      Comment: using E-cig     Alcohol use No     Drug use: No     Sexual activity: Yes     Partners: Male      Comment: husbandvasectomy  "    Other Topics Concern     Parent/Sibling W/ Cabg, Mi Or Angioplasty Before 65f 55m? No     Social History Narrative       MEDICATIONS:  Current Outpatient Prescriptions   Medication Sig Dispense Refill     acetaminophen (TYLENOL ARTHRITIS PAIN) 650 MG CR tablet Take 1,300 mg by mouth every 8 hours as needed        acyclovir (ZOVIRAX) 5 % cream Apply  topically. Apply to affected area thinly five times daily at the first sign of symptoms. 5 g 2     CALCIUM + D OR 1 TABLET DAILY       fluocinonide-emollient (LIDEX-E) 0.05 % cream Apply to affected area of elbows twice daily as needed. 30 g 3     HYDROcodone-acetaminophen (NORCO)  MG per tablet Take 0.5-1 tablets by mouth every evening as needed for pain Take 1/2 - 1 tablet at night as needed for back pain. 30 tablet 0     ibuprofen 200 MG capsule Take 200 mg by mouth every 4 hours as needed for fever       LORazepam (ATIVAN) 0.5 MG tablet Take 1-2 tablets (0.5-1 mg) by mouth every 8 hours as needed for anxiety 20 tablet 1     methocarbamol (ROBAXIN) 750 MG tablet Take 1-1.5 tablets (750-1,125 mg) by mouth 3 times daily as needed for muscle spasms 45 tablet 1     MULTIVITAMINS OR 1 daily        naproxen sodium (ALEVE) 220 MG capsule Take 220 mg by mouth 2 times daily as needed       tolterodine (DETROL LA) 2 MG 24 hr capsule Take 1 capsule (2 mg) by mouth daily (Patient not taking: Reported on 9/14/2018) 30 capsule 11       ALLERGIES:     Allergies   Allergen Reactions     Nkda [No Known Drug Allergies]        Problem list, Medication list, Allergies, and Medical/Social/Surgical histories reviewed in [facility] and updated as appropriate.    OBJECTIVE:                                                    VITALS: /60 (BP Location: Right arm, Cuff Size: Adult Large)  Pulse 81  Temp 97.5  F (36.4  C) (Tympanic)  Resp 16  Ht 5' 6.5\" (1.689 m)  Wt 219 lb 6.4 oz (99.5 kg)  LMP 03/06/2011  SpO2 97%  Breastfeeding? No  BMI 34.88 kg/m2 Body mass index is 34.88 " kg/(m^2).  GENERAL: Pleasant, well appearing female.  HEENT: PERRL, EOMI, oropharynx clear.  NECK: supple, no thyromegaly or thyroid masses, no lymphadenopathy.  CV: RRR, no murmurs, rubs or gallops.  LUNGS: Clear to auscultation bilaterally, normal effort.  ABDOMEN: Soft, non-distended, non-tender.  No hepatosplenomegaly or palpable masses.    SKIN: warm and dry without obvious rashes.   EXTREMITIES: No edema, normal pulses.   PSYCH: Alert and oriented x3, neatly dressed and well groomed, makes good eye contact, fluid speech - non-pressured, no psychomotor agitation or slowing, good memory, judgement and insight, no auditory or visual hallucinations, bright affect which is mood congruent.     Right middle finger x-ray obtained. Read and interpreted by me. No obvious bony deformities.      ASSESSMENT/PLAN:                                                    1. Injury of right middle finger, initial encounter  Buddy tape PRN comfort, ice. Gentle ROM. Follow-up if not improving or if worsening.   - XR Finger Left G/E 2 Views; Future    2. Hyperglycemia  Check labs.   - Hemoglobin A1c    3. Anxiety  Well controlled. Refilled medication.     - LORazepam (ATIVAN) 0.5 MG tablet; Take 1-2 tablets (0.5-1 mg) by mouth every 8 hours as needed for anxiety  Dispense: 20 tablet; Refill: 1    4. Lipid screening  - Lipid Profile (Chol, Trig, HDL, LDL calc)    5. Need for prophylactic vaccination and inoculation against influenza  - Vaccine Administration, Initial [40984]  - FLU VACCINE, SPLIT VIRUS, IM (QUADRIVALENT) [73537]- >3 YRS  - Vaccine Administration, Initial [43919]         Injectable Influenza Immunization Documentation    1.  Is the person to be vaccinated sick today?   No    2. Does the person to be vaccinated have an allergy to a component   of the vaccine?   No  Egg Allergy Algorithm Link    3. Has the person to be vaccinated ever had a serious reaction   to influenza vaccine in the past?   No    4. Has the person to  be vaccinated ever had Guillain-Barré syndrome?   No    Form completed by Elizabeth Field MA

## 2018-09-14 NOTE — MR AVS SNAPSHOT
After Visit Summary   9/14/2018    Joana Arredondo    MRN: [id number]           Patient Information     Date Of Birth          1960        Visit Information        Provider Department      9/14/2018 10:40 AM Marybel Aguiar MD [facility]        Today's Diagnoses     Need for prophylactic vaccination and inoculation against influenza    -  1    Injury of right middle finger, initial encounter        Hyperglycemia        Lipid screening        Anxiety          Care Instructions          Thank you for choosing [facility].  You may be receiving a survey in the mail from [facility] regarding your visit today.  Please take a few minutes to complete and return the survey to let us know how we are doing.      Our Clinic hours are:  Mondays    7:20 am - 7 pm  Tues -  Fri  7:20 am - 5 pm    Clinic Phone: [phone]    The clinic lab opens at 7:30 am Mon - Fri and appointments are required.    [facility]  Ph. [phone]  Monday  8 am - 7pm  Tues - Fri 8 am - 5:30 pm                 Follow-ups after your visit        Who to contact     If you have questions or need follow up information about today's clinic visit or your schedule please contact [facility] directly at [phone].  Normal or non-critical lab and imaging results will be communicated to you by MyChart, letter or phone within 4 business days after the clinic has received the results. If you do not hear from us within 7 days, please contact the clinic through AppLovinhart or phone. If you have a critical or abnormal lab result, we will notify you by phone as soon as possible.  Submit refill requests through Pathful or call your pharmacy and they will forward the refill request to us. Please allow 3 business days for your refill to be completed.          Additional Information About Your Visit        [facility] Information     Pathful gives you secure access to your  "electronic health record. If you see a primary care provider, you can also send messages to your care team and make appointments. If you have questions, please call your primary care clinic.  If you do not have a primary care provider, please call [phone] and they will assist you.        Care EveryWhere ID     This is your Care EveryWhere ID. This could be used by other organizations to access your [facility] medical records  [id number]        Your Vitals Were     Pulse Temperature Respirations Height Last Period Pulse Oximetry    81 97.5  F (36.4  C) (Tympanic) 16 5' 6.5\" (1.689 m) 03/06/2011 97%    Breastfeeding? BMI (Body Mass Index)                No 34.88 kg/m2           Blood Pressure from Last 3 Encounters:   09/14/18 110/60   03/07/18 122/81   01/22/18 126/67    Weight from Last 3 Encounters:   09/14/18 219 lb 6.4 oz (99.5 kg)   03/07/18 236 lb 12.8 oz (107.4 kg)   01/02/18 234 lb (106.1 kg)              We Performed the Following     FLU VACCINE, SPLIT VIRUS, IM (QUADRIVALENT) [17273]- >3 YRS     Hemoglobin A1c     Lipid Profile (Chol, Trig, HDL, LDL calc)     Vaccine Administration, Initial [31736]     Vaccine Administration, Initial [35643]          Where to get your medicines      Some of these will need a paper prescription and others can be bought over the counter.  Ask your nurse if you have questions.     Bring a paper prescription for each of these medications     LORazepam 0.5 MG tablet          Primary Care Provider Office Phone # Fax #    Marybel Kaylah Aguiar [phone] [address] [facility] [address]        Equal Access to Services     MARVIN WAYNE : Hadii demi Delaney, rosalineda pascual, qaybta kaalmasamuel duran. So [facility] [phone].    ATENCIÓN: Si habla español, tiene a flanagan disposición servicios gratuitos de asistencia lingüística. Llame al [phone].    We comply with applicable federal civil " rights laws and [place] laws. We do not discriminate on the basis of race, color, national origin, age, disability, sex, sexual orientation, or gender identity.            Thank you!     Thank you for choosing [facility]  for your care. Our goal is always to provide you with excellent care. Hearing back from our patients is one way we can continue to improve our services. Please take a few minutes to complete the written survey that you may receive in the mail after your visit with us. Thank you!             Your Updated Medication List - Protect others around you: Learn how to safely use, store and throw away your medicines at www.disposemymeds.org.          This list is accurate as of 9/14/18 12:07 PM.  Always use your most recent med list.                   Brand Name Dispense Instructions for use Diagnosis    acyclovir 5 % cream    ZOVIRAX    5 g    Apply  topically. Apply to affected area thinly five times daily at the first sign of symptoms.    Herpes simplex without mention of complication       ALEVE 220 MG capsule   Generic drug:  naproxen sodium      Take 220 mg by mouth 2 times daily as needed        CALCIUM + D PO      1 TABLET DAILY        fluocinonide-emollient 0.05 % cream    LIDEX-E    30 g    Apply to affected area of elbows twice daily as needed.    Other eczema       HYDROcodone-acetaminophen  MG per tablet    NORCO    30 tablet    Take 0.5-1 tablets by mouth every evening as needed for pain Take 1/2 - 1 tablet at night as needed for back pain.    Chronic low back pain without sciatica, unspecified back pain laterality       ibuprofen 200 MG capsule      Take 200 mg by mouth every 4 hours as needed for fever        LORazepam 0.5 MG tablet    ATIVAN    20 tablet    Take 1-2 tablets (0.5-1 mg) by mouth every 8 hours as needed for anxiety    Anxiety       methocarbamol 750 MG tablet    ROBAXIN    45 tablet    Take 1-1.5 tablets (750-1,125 mg) by mouth 3 times daily as  needed for muscle spasms    Chronic low back pain without sciatica, unspecified back pain laterality       MULTIVITAMINS PO      1 daily        tolterodine 2 MG 24 hr capsule    DETROL LA    30 capsule    Take 1 capsule (2 mg) by mouth daily    Mixed stress and urge urinary incontinence       TYLENOL ARTHRITIS PAIN 650 MG CR tablet   Generic drug:  acetaminophen      Take 1,300 mg by mouth every 8 hours as needed    Chronic low back pain

## 2018-09-14 NOTE — PATIENT INSTRUCTIONS
Thank you for choosing [facility].  You may be receiving a survey in the mail from [facility] regarding your visit today.  Please take a few minutes to complete and return the survey to let us know how we are doing.      Our Clinic hours are:  Mondays    7:20 am - 7 pm  Tues - Fri  7:20 am - 5 pm    Clinic Phone: [phone]    The clinic lab opens at 7:30 am Mon - Fri and appointments are required.    [facility]. [phone]  Monday  8 am - 7pm  Tues - Fri 8 am - 5:30 pm

## 2018-09-15 ASSESSMENT — PATIENT HEALTH QUESTIONNAIRE - PHQ9: SUM OF ALL RESPONSES TO PHQ QUESTIONS 1-9: 0

## 2018-09-15 ASSESSMENT — ANXIETY QUESTIONNAIRES: GAD7 TOTAL SCORE: 1

## 2018-09-19 NOTE — PROGRESS NOTES
Notified via [email or web address]: Cholesterol mildly elevated.  Not abnormal enough at this point to warrant medication changes but I would recommend: increasing daily exercise, increasing dietary fiber, eliminating trans fats and reducing saturated fats. Otherwise labs look good.

## 2018-10-03 ENCOUNTER — E-VISIT (OUTPATIENT)
Dept: FAMILY MEDICINE | Facility: CLINIC | Age: 58
End: 2018-10-03
Payer: COMMERCIAL

## 2018-10-03 DIAGNOSIS — F32.5 MAJOR DEPRESSION IN COMPLETE REMISSION (H): Primary | ICD-10-CM

## 2018-10-03 DIAGNOSIS — F41.9 ANXIETY: ICD-10-CM

## 2018-10-03 PROCEDURE — 99444 ZZC PHYSICIAN ONLINE EVALUATION & MANAGEMENT SERVICE: CPT | Performed by: FAMILY MEDICINE

## 2018-10-03 ASSESSMENT — ANXIETY QUESTIONNAIRES
1. FEELING NERVOUS, ANXIOUS, OR ON EDGE: NEARLY EVERY DAY
5. BEING SO RESTLESS THAT IT IS HARD TO SIT STILL: SEVERAL DAYS
3. WORRYING TOO MUCH ABOUT DIFFERENT THINGS: SEVERAL DAYS
GAD7 TOTAL SCORE: 10
7. FEELING AFRAID AS IF SOMETHING AWFUL MIGHT HAPPEN: MORE THAN HALF THE DAYS
7. FEELING AFRAID AS IF SOMETHING AWFUL MIGHT HAPPEN: MORE THAN HALF THE DAYS
4. TROUBLE RELAXING: MORE THAN HALF THE DAYS
GAD7 TOTAL SCORE: 10
GAD7 TOTAL SCORE: 10
6. BECOMING EASILY ANNOYED OR IRRITABLE: NOT AT ALL
2. NOT BEING ABLE TO STOP OR CONTROL WORRYING: SEVERAL DAYS

## 2018-10-03 ASSESSMENT — PATIENT HEALTH QUESTIONNAIRE - PHQ9
SUM OF ALL RESPONSES TO PHQ QUESTIONS 1-9: 14
10. IF YOU CHECKED OFF ANY PROBLEMS, HOW DIFFICULT HAVE THESE PROBLEMS MADE IT FOR YOU TO DO YOUR WORK, TAKE CARE OF THINGS AT HOME, OR GET ALONG WITH OTHER PEOPLE: SOMEWHAT DIFFICULT
SUM OF ALL RESPONSES TO PHQ QUESTIONS 1-9: 14

## 2018-10-04 ASSESSMENT — ANXIETY QUESTIONNAIRES: GAD7 TOTAL SCORE: 10

## 2018-10-04 ASSESSMENT — PATIENT HEALTH QUESTIONNAIRE - PHQ9: SUM OF ALL RESPONSES TO PHQ QUESTIONS 1-9: 14

## 2018-11-01 ENCOUNTER — TELEPHONE (OUTPATIENT)
Dept: FAMILY MEDICINE | Facility: CLINIC | Age: 58
End: 2018-11-01

## 2018-11-01 NOTE — TELEPHONE ENCOUNTER
Pt recently restarted Prozac.  Med is working well for her depression.  Pt is having episodes of vertigo,spinning when she rolls over in bed, sits up quickly, moves her neck side to side.  Discussed med side effects and BPPV symptoms.  Pt has appt on 11/16/18.  Will f/u sooner if symptoms worsen.  Josey

## 2018-11-01 NOTE — TELEPHONE ENCOUNTER
Reason for Call:  Other     Detailed comments: Pt experiencing Vertigo,the medication of  Fluoxitine on recently and wondering if this is the reason.  Please advise.    Phone Number Patient can be reached at: Cell number on file:    Telephone Information:   Mobile [phone]       Best Time: any    Can we leave a detailed message on this number? YES    Call taken on 11/1/2018 at 9:06 AM by Ansley Epperson

## 2019-01-04 DIAGNOSIS — F41.9 ANXIETY: ICD-10-CM

## 2019-01-04 DIAGNOSIS — N95.1 MENOPAUSAL SYNDROME (HOT FLASHES): ICD-10-CM

## 2019-01-07 NOTE — TELEPHONE ENCOUNTER
"Requested Prescriptions   Pending Prescriptions Disp Refills     FLUoxetine (PROZAC) 20 MG capsule  Not on med list  Last Written Prescription Date:  6/20/2016 d/cd on 5/4/2017  Last Fill Quantity: 180,  # refills: 3   Last office visit: 9/14/2018 with prescribing provider: [place]   Future Office Visit:     180 capsule 3     Sig: Take 2 capsules (40 mg) by mouth daily    SSRIs Protocol Failed - 1/4/2019  1:07 PM       Failed - Medication is active on med list       Passed - Recent (12 mo) or future (30 days) visit within the authorizing provider's specialty    Patient had office visit in the last 12 months or has a visit in the next 30 days with authorizing provider or within the authorizing provider's specialty.  See \"Patient Info\" tab in inbasket, or \"Choose Columns\" in Meds & Orders section of the refill encounter.             Passed - Patient is age 18 or older       Passed - No active pregnancy on record       Passed - No positive pregnancy test in last 12 months          "

## 2019-01-07 NOTE — TELEPHONE ENCOUNTER
Order Status Reason By On   Discontinued Therapy completed Indu Chaparro, KRANTHI 5/4/17 1620     Need clinic visit. Left message for patient to return call to clinic.     TONO PardoN, RN

## 2019-01-11 ENCOUNTER — E-VISIT (OUTPATIENT)
Dept: FAMILY MEDICINE | Facility: CLINIC | Age: 59
End: 2019-01-11
Payer: COMMERCIAL

## 2019-01-11 DIAGNOSIS — N95.1 MENOPAUSAL SYNDROME (HOT FLASHES): ICD-10-CM

## 2019-01-11 DIAGNOSIS — F41.9 ANXIETY: ICD-10-CM

## 2019-01-11 PROCEDURE — 99444 ZZC PHYSICIAN ONLINE EVALUATION & MANAGEMENT SERVICE: CPT | Performed by: FAMILY MEDICINE

## 2019-01-11 RX ORDER — FLUOXETINE 40 MG/1
40 CAPSULE ORAL DAILY
Qty: 90 CAPSULE | Refills: 3 | Status: SHIPPED | OUTPATIENT
Start: 2019-01-11 | End: 2020-02-19

## 2019-02-05 ENCOUNTER — TELEPHONE (OUTPATIENT)
Dept: FAMILY MEDICINE | Facility: CLINIC | Age: 59
End: 2019-02-05

## 2019-02-05 NOTE — TELEPHONE ENCOUNTER
Panel Management Review      Patient has the following on her problem list: None      Composite cancer screening  Chart review shows that this patient is due/due soon for the following Pap Smear and Mammogram  Summary:    Patient is due/failing the following:   MAMMOGRAM, PAP and PHYSICAL    Action needed:   Patient needs office visit for annual physical/pap and to schedule a mammogram.    Type of outreach:    Sent letter.    Questions for provider review:    None                                                                                                                                    Elizabeth Field MA

## 2019-02-05 NOTE — LETTER
[facility]  [address]  [facility] [address]  [phone]  February 5, 2019    Joana Arredondo  [address] [place] [address]    Dear Joana,    We care about your health and have reviewed your health plan including your medical conditions, medication list, and lab results.  Based on this review, it is recommended that you follow up regarding the following health topic(s):     -Breast Cancer Screening  -Wellness (Physical) Visit     We recommend you take the following action(s):    To schedule annual physical please call [phone] to make an appointment.    -Please call [phone] to schedule you mammogram at one of our locations. Your last mammogram was on 7/25/16.      Thank you for trusting [facility] and we appreciate the opportunity to serve you.  We look forward to supporting your healthcare needs in the future.    Healthy Regards,      Dr. Marybel Aguiar MD/la  Your Health Care Team  [facility]

## 2019-02-08 ENCOUNTER — MYC MEDICAL ADVICE (OUTPATIENT)
Dept: FAMILY MEDICINE | Facility: CLINIC | Age: 59
End: 2019-02-08

## 2019-02-08 NOTE — LETTER
[address]  [address]  Phone: [phone]  Fax: [phone]      1/1/2019       Joana Arredondo  [address] [address]  Phone: [phone]            Joana Arredondo,      Dear ,     This is to certify that you are being evaluated and treated for some left hip discomfort. This appears to be soft tissue related, and it may be that massages will give relief. You should be able to tap into your health care savings account for therapeutic massage every 1-2 weeks as needed.            Sincerely,          Marybel Aguiar MD

## 2019-04-24 ENCOUNTER — TELEPHONE (OUTPATIENT)
Dept: FAMILY MEDICINE | Facility: CLINIC | Age: 59
End: 2019-04-24

## 2019-04-24 NOTE — TELEPHONE ENCOUNTER
4/24/2019    Attempt 1    Contacted patient in regards to scheduling VIP mammogram screening at [facility] on May 15th  Message on voicemail     Patient is also due for - Preventive Health Screening Cervical/PAP    Comments:       Outreach   Dimple CEJA

## 2019-05-01 NOTE — TELEPHONE ENCOUNTER
Left message to call back. Due to go over Phq9. Patient is also due for mammogram, annual physical/pap

## 2019-05-06 ASSESSMENT — ANXIETY QUESTIONNAIRES
3. WORRYING TOO MUCH ABOUT DIFFERENT THINGS: NOT AT ALL
1. FEELING NERVOUS, ANXIOUS, OR ON EDGE: NOT AT ALL
7. FEELING AFRAID AS IF SOMETHING AWFUL MIGHT HAPPEN: NOT AT ALL
6. BECOMING EASILY ANNOYED OR IRRITABLE: NOT AT ALL
GAD7 TOTAL SCORE: 0
2. NOT BEING ABLE TO STOP OR CONTROL WORRYING: NOT AT ALL
5. BEING SO RESTLESS THAT IT IS HARD TO SIT STILL: NOT AT ALL

## 2019-05-06 ASSESSMENT — PATIENT HEALTH QUESTIONNAIRE - PHQ9
5. POOR APPETITE OR OVEREATING: NOT AT ALL
SUM OF ALL RESPONSES TO PHQ QUESTIONS 1-9: 1

## 2019-05-06 NOTE — TELEPHONE ENCOUNTER
Panel Management Review      Patient has the following on her problem list:     Depression / Dysthymia review    Measure:  Needs PHQ-9 score of 4 or less during index window.  Administer PHQ-9 and if score is 5 or more, send encounter to provider for next steps.    5 - 7 month window range: no    PHQ-9 SCORE 9/14/2018 10/3/2018 5/6/2019   PHQ-9 Total Score - - -   PHQ-9 Total Score MyChart - 14 (Moderate depression) -   PHQ-9 Total Score 0 14 1       If PHQ-9 recheck is 5 or more, route to provider for next steps.    Patient is due for:  PHQ9      Composite cancer screening  Chart review shows that this patient is due/due soon for the following Pap Smear and Mammogram  Summary:    Patient is due/failing the following:   PHQ9 and PHYSICAL    Action needed:   Patient needs to do PHQ9.    Type of outreach:    Phone, spoke to patient.  PHQ-9 done and reminded to schedule for [place] and mammo    Questions for provider review:    None                                                                                                                                    Elizabeth Yo MA

## 2019-05-07 ASSESSMENT — ANXIETY QUESTIONNAIRES: GAD7 TOTAL SCORE: 0

## 2019-05-22 ENCOUNTER — MYC MEDICAL ADVICE (OUTPATIENT)
Dept: FAMILY MEDICINE | Facility: CLINIC | Age: 59
End: 2019-05-22

## 2019-05-22 NOTE — LETTER
[facility]  [address]  [place] [address]  Phone: [phone]    May 23, 2019        Joana Arredondo  [address] [place] [address]          To whom it may concern:    RE: Joana Maria Elena    Please allow Joana to use her [employer] account for over the counter acetaminophen, ibuprofen and naproxen as she uses these for medical conditions.    Please contact me for questions or concerns.      Sincerely,        Marybel Aguiar MD

## 2019-05-23 NOTE — TELEPHONE ENCOUNTER
Ok for these. Letter drafted. Just be sure she's aware to not use ibuprofen and naproxen in the same day as they are similar medications.  Ok to combine either of these with acetaminophen.

## 2019-07-22 ENCOUNTER — ANCILLARY PROCEDURE (OUTPATIENT)
Dept: MAMMOGRAPHY | Facility: CLINIC | Age: 59
End: 2019-07-22
Attending: FAMILY MEDICINE
Payer: COMMERCIAL

## 2019-07-22 DIAGNOSIS — Z12.31 VISIT FOR SCREENING MAMMOGRAM: ICD-10-CM

## 2019-07-22 PROCEDURE — 77067 SCR MAMMO BI INCL CAD: CPT | Mod: TC

## 2019-09-04 ENCOUNTER — TELEPHONE (OUTPATIENT)
Dept: FAMILY MEDICINE | Facility: CLINIC | Age: 59
End: 2019-09-04

## 2019-09-04 NOTE — TELEPHONE ENCOUNTER
Panel Management Review      Patient has the following on her problem list:     Depression / Dysthymia review    Measure:  Needs PHQ-9 score of 4 or less during index window.  Administer PHQ-9 and if score is 5 or more, send encounter to provider for next steps.    5 - 7 month window range:     PHQ-9 SCORE 9/14/2018 10/3/2018 5/6/2019   PHQ-9 Total Score - - -   PHQ-9 Total Score MyChart - 14 (Moderate depression) -   PHQ-9 Total Score 0 14 1       If PHQ-9 recheck is 5 or more, route to provider for next steps.    Patient is due for:  None      Composite cancer screening  Chart review shows that this patient is due/due soon for the following Pap Smear  Summary:    Patient is due/failing the following:   PAP and PHYSICAL    Action needed:   Patient needs office visit for annual pe / pap.    Type of outreach:    Sent letter.    Questions for provider review:    None                                                                                                                                    Elizabeth Field MA

## 2019-09-04 NOTE — LETTER
[facility]  [address]  [facility] [id number] [phone]  September 4, 2019    Joana Arredondo  [address] [place] [address]    Dear Joana,    We care about your health and have reviewed your health plan including your medical conditions, medication list, and lab results.  Based on this review, it is recommended that you follow up regarding the following health topic(s):     -Wellness (Physical) Visit       We recommend you take the following action(s):    -schedule a annual physical/pap which is due. Please call [phone] to schedule.    Please disregard this reminder if you have had this exam elsewhere within the last year. It would be helpful for us to have a copy of your recent pap smear report in our file so that we can best coordinate your care.     If you are under/uninsured, we recommend you contact [facility]. They offer pap smears at no charge or on a sliding fee charge. You can schedule with them at [phone]. Please have them send us the results.     Please use [employer], or call the clinic at your earliest convenience, to schedule an appointment: [phone].     Thank you for trusting [facility] and we appreciate the opportunity to serve you.  We look forward to supporting your healthcare needs in the future.    Healthy Regards,      Your Health Care Team  [facility]

## 2019-11-12 NOTE — MR AVS SNAPSHOT
After Visit Summary   2018    Joana Arredondo    MRN: [id number]           Patient Information     Date Of Birth          1960        Visit Information        Provider Department      2018 7:30 AM Tato Dias MD [facility]        Today's Diagnoses     Basal cell carcinoma of left nasal tip    -  1      Care Instructions      Sutured Wound Care     [facility]: [phone]    [facility]: [phone]          ? No strenuous activity for 48 hours. Resume moderate activity in 48 hours. No heavy exercising until you are seen for follow up in one week.     ? Take Tylenol as needed for discomfort.                         ? Do not drink alcoholic beverages for 48 hours.     ? Keep the pressure bandage in place for 24 hours. If the bandage becomes blood tinged or loose, reinforce it with gauze and tape.        (Refer to the reverse side of this page for management of bleeding).    ? Remove pressure bandage in 24 hours     ? Leave the flat bandage in place until your follow up appointment.    ? Keep the bandage dry. Wash around it carefully.    ? If the tape becomes soiled or starts to come off, reinforce it with additional paper tape.    ? Do not smoke for 3 weeks; smoking is detrimental to wound healing.    ? It is normal to have swelling and bruising around the surgical site. The bruising will fade in approximately 10-14 days. Elevate the area to reduce swelling.    ? Numbness, itchiness and sensitivity to temperature changes can occur after surgery and may take up to 18 months to normalize.      POSSIBLE COMPLICATIONS    BLEEDIN. Leave the bandage in place.  2. Use tightly rolled up gauze or a cloth to apply direct pressure over the bandage for 20   minutes.  3. Reapply pressure for an additional 20 minutes if necessary  4. Call the office or go to the nearest emergency room if pressure fails to stop the bleeding.  5. Use additional  CARDIOLOGY NEW PATIENT    Referring Physician: Ruthie Kaur MD  Reason for Referral: Chest Pain    HPI:   Angely Oglesby is a 69 year old female with a history of HTN, DM presenting for evaluation of chest pain.  Mrs. Oglesby has had intermittent chest pain for the last 2 months.  She states that ever since she took a trip from [place] 2 months ago she has had intermittent left-sided chest pain with left arm discomfort.  The pain lasts anywhere from 10 minutes to an hour.  She was concerned that the pain is related to high blood pressure, but ultimately she was seen by her PCP Dr. Kaur and a stress test was ordered which was without evidence of ischemia.  She also had a chest x-ray which showed some mild cardiomegaly and some atherosclerotic changes in the thoracic aorta.  She states that the symptom is not associated with shortness of breath nor exertion.  She states that the symptom only comes on at rest and is quite uncomfortable.  She denies any palpitations, PND, orthopnea, lower externally swelling or claudication-like symptoms.    Depression Screening:  Over the past 2 weeks, has patient felt down, depressed or hopeless?  No  Over the past 2 weeks, has patient felt little interest or pleasure in doing things?  No    On the basis of the above screen, the following is initiated:  Normal screen    ROS:  GENERAL: (-) fever (-) chills (-) fatigue (-) weight loss  EYES: (-) blurry vision (-) change in vision  ENT: (-) masses (-) pain (-) changes/loss of hearing  RESPIRATORY: (-) dyspnea on exertion (-) shortness of breath (-) cough  CARDIOVASCULAR: (+) chest pain (-) syncope (-) palpitations (-) orthopnea (-) paroxysmal nocturnal dyspnea (-) lower extremity swelling  GASTRO: (-) nausea/vomiting (-) bright red blood per rectum (-) melena (-) constipation (-) diarrhea  GENITOURINARY: (-) hematuria (-) oliguria (-) frequency  HEMATOLOGIC: (-) easy bruising (-) bleeding  NEUROLOGIC: (-) dizziness (-) weakness (-)  gauze and tape to maintain pressure once the bleeding has stopped.        PAIN:    1. Post operative pain should slowly get better, never worse.  2. A severe increase in pain may indicate a problem. Call the office if this occurs.    In case of emergency phone:Dr Dias [phone]              Follow-ups after your visit        Who to contact     If you have questions or need follow up information about today's clinic visit or your schedule please contact [facility] directly at [phone].  Normal or non-critical lab and imaging results will be communicated to you by [employer], letter or phone within 4 business days after the clinic has received the results. If you do not hear from us within 7 days, please contact the clinic through [employer] or phone. If you have a critical or abnormal lab result, we will notify you by phone as soon as possible.  Submit refill requests through [employer] or call your pharmacy and they will forward the refill request to us. Please allow 3 business days for your refill to be completed.          Additional Information About Your Visit        [employer] Information     [employer] gives you secure access to your electronic health record. If you see a primary care provider, you can also send messages to your care team and make appointments. If you have questions, please call your primary care clinic.  If you do not have a primary care provider, please call [phone] and they will assist you.        Care EveryWhere ID     This is your Care EveryWhere ID. This could be used by other organizations to access your [place] medical records  [id number]        Your Vitals Were     Pulse Last Period Pulse Oximetry             112 03/06/2011 99%          Blood Pressure from Last 3 Encounters:   01/22/18 126/67   01/16/18 115/76   01/08/18 115/70    Weight from Last 3 Encounters:   01/02/18 234 lb (106.1 kg)   05/04/17 230 lb 3.2 oz (104.4 kg)   03/10/16 231 lb (104.8 kg)              We  paresthesias (-) loss of sensation (-) focal weakness  PSYCHIATRIC: (-) depressions (-) anxiety  MUSC: (-) muscle aches (-) joint pain    PMH:  Past Medical History:   Diagnosis Date   • Controlled type 2 diabetes mellitus without complication, without long-term current use of insulin (CMS/[facility]) 1/13/2017   • Gastroesophageal reflux disease 1/13/2017   • Hypertension 5/18/2019     MEDS:  Current Outpatient Medications   Medication Sig Dispense Refill   • metformin (GLUCOPHAGE) 1000 MG tablet Take 1 tablet by mouth 2 times daily (with meals). 180 tablet 1   • olmesartan (BENICAR) 5 MG tablet Take 2 tablets by mouth daily. 180 tablet 1   • amoxicillin (AMOXIL) 500 MG tablet Take 2 tablets by mouth 2 times daily for 14 days. 56 tablet 0   • clarithromycin (BIAXIN) 500 MG tablet Take 1 tablet by mouth 2 times daily for 14 days. 28 tablet 0   • hydroCORTisone (CORTIZONE) 2.5 % cream Apply 1 application topically every evening as needed (QHS, PRN). 30 g 3   • pantoprazole (PROTONIX) 40 MG tablet Take 1 tablet by mouth daily. 90 tablet 1   • aspirin (ECOTRIN) 81 MG EC tablet Take 1 Tab by mouth daily. - Oral     • Lancets (MICROLET) Misc Test blood glucose once daily. Dx: E11.9     • hydroCORTisone (ANUSOL-HC) 25 MG suppository Place 1 suppository rectally 2 times daily. 12 each 0   • simvastatin (ZOCOR) 40 MG tablet Take 1 tablet by mouth nightly. 90 tablet 1     No current facility-administered medications for this visit.      SOCIAL HISTORY:  Social History     Tobacco Use   • Smoking status: Never Smoker   • Smokeless tobacco: Never Used   Substance Use Topics   • Alcohol use: Not on file   • Drug use: Not on file     FAMILY HISTORY:  Family History   Problem Relation Age of Onset   • Patient is unaware of any medical problems Mother    • Patient is unaware of any medical problems Father    • Diabetes Sister    • Diabetes Brother       ALLERGIES:  ALLERGIES:  No Known Allergies    O:  VITALS:  Visit Vitals  /68    Pulse 80   Resp 16   Ht 5' 1\" (1.549 m)   Wt 63.5 kg (140 lb)   SpO2 96%   BMI 26.45 kg/m²       PHYSICAL EXAM:  GEN: well groomed, NAD, conversant  SKIN: no rashes noted, normal temperature  NECK: neck supple, trachea midline, no thyromegaly  EYES: sclerae anicteric, moist conjunctiva, PERRLA  ENT: oropharynx clear, MMM   CV: RR, no murmurs/rubs/gallops, no extra heart sounds, LV impulse non displaced, JVP 6cm  LUNGS: clear to auscultation bilaterally, no wheezes/rales/rhonchi, normal respiratory effort, no usage of accessory muscles  ABD: soft, non-tender, non-distended, normal active bowel sounds  EXT: no peripheral edema noted  PSYCH: A&Ox3, appropriate affect  MUSC: no clubbing or cyanosis noted  VASCULAR: Pulses - Rt DP 2+, PT 2+, Femoral 2+; Lt DP 2+, PT 2+, Femoral 2+    Carotids - no bruits, normal upstroke    I extensively reviewed clinical history, medical records, and various cardiovascular studies with patient. Pertinent testing is listed below.      LABS:  Ancillary Procedure on 09/20/2019   Component Date Value Ref Range Status   • Predicted HR Max 09/20/2019 153  BPM Final   Office Visit on 08/30/2019   Component Date Value Ref Range Status   • CLARITY 08/30/2019 CLEAR  CLEAR Final   • PH URINE 08/30/2019 6.0  5.0 - 7.0 Final   • COLOR 08/30/2019 STRAW  YELLOW Final   • SPECIFIC GRAVITY URINE 08/30/2019 1.009  1.001 - 1.030 Final   • BLOOD URINE 08/30/2019 NEGATIVE  NEGATIVE Final   • KETONES, URINE 08/30/2019 NEGATIVE  NEGATIVE Final   • GLUCOSE QUALITATIVE U 08/30/2019 NEGATIVE  NEGATIVE Final   • UROBILINOGEN URINE 08/30/2019 <2.0  <2 Final   • URINE PROTEIN, QUAL (DIPSTICK) 08/30/2019 NEGATIVE  NEGATIVE Final   • BILIRUBIN URINE 08/30/2019 NEGATIVE  NEGATIVE Final   • LEUKOCYTE ESTERASE URINE 08/30/2019 NEGATIVE  NEGATIVE Final   • NITRITE URINE 08/30/2019 NEGATIVE  NEGATIVE Final   • TSH 08/30/2019 0.99  0.30 - 4.82 m[iU]/L Final    Comment: Specimens from patients who are taking high dose of  Performed the Following     95255 - ADJ TISSUE XFER LID/NOS/EAR/LIP 10.1-30 CM     MOHS HEAD/NCK/HND/FT/GEN 1ST STAGE UP T0 5 BLOCKS          Today's Medication Changes          These changes are accurate as of: 1/22/18 10:13 AM.  If you have any questions, ask your nurse or doctor.               These medicines have changed or have updated prescriptions.        Dose/Directions    HYDROcodone-acetaminophen  MG per tablet   Commonly known as:  NORCO   This may have changed:    - how much to take  - how to take this  - when to take this  - reasons to take this  - additional instructions   Used for:  Chronic low back pain without sciatica, unspecified back pain laterality        Take 1/2 - 1 tablet at night as needed for back pain.   Quantity:  40 tablet   Refills:  0                Primary Care Provider Fax #    Physician No Ref-Primary [phone]       No address on file        Equal Access to Services     NIRAJ WAYNE : Massimo Delaney, j carlos garner, david gray, samuel kamara . So [facility] [phone].    ATENCIÓN: Si habla español, tiene a flanagan disposición servicios gratuitos de asistencia lingüística. Joceline al [phone].    We comply with applicable federal civil rights laws and [place] laws. We do not discriminate on the basis of race, color, national origin, age, disability, sex, sexual orientation, or gender identity.            Thank you!     Thank you for choosing [facility]  for your care. Our goal is always to provide you with excellent care. Hearing back from our patients is one way we can continue to improve our services. Please take a few minutes to complete the written survey that you may receive in the mail after your visit with us. Thank you!             Your Updated Medication List - Protect others around you: Learn how to safely use, store and throw away your medicines at www.disposemymeds.org.          This list is  accurate as of: 1/22/18 10:13 AM.  Always use your most recent med list.                   Brand Name Dispense Instructions for use Diagnosis    acyclovir 5 % cream    ZOVIRAX    5 g    Apply  topically. Apply to affected area thinly five times daily at the first sign of symptoms.    Herpes simplex without mention of complication       CALCIUM + D PO      1 TABLET DAILY        cyclobenzaprine 10 MG tablet    FLEXERIL    30 tablet    Take 1 tablet (10 mg) by mouth 3 times daily as needed for muscle spasms        fluocinonide-emollient 0.05 % cream    LIDEX-E    30 g    Apply to affected area of elbows twice daily as needed.    Other eczema       HYDROcodone-acetaminophen  MG per tablet    NORCO    40 tablet    Take 1/2 - 1 tablet at night as needed for back pain.    Chronic low back pain without sciatica, unspecified back pain laterality       LORazepam 0.5 MG tablet    ATIVAN    20 tablet    Take 1-2 tablets (0.5-1 mg) by mouth every 8 hours as needed for anxiety    Anxiety       MULTIVITAMINS PO      1 daily        phentermine 37.5 MG capsule     30 capsule    Take 1/2 tablet daily in the AM.    Non morbid obesity due to excess calories       tolterodine 2 MG 24 hr capsule    DETROL LA    30 capsule    Take 1 capsule (2 mg) by mouth daily    Mixed stress and urge urinary incontinence       traMADol 50 MG tablet    ULTRAM    20 tablet    Take 1-2 tablets ( mg) by mouth every 6 hours as needed for pain        TYLENOL ARTHRITIS PAIN 650 MG CR tablet   Generic drug:  acetaminophen      Take 1,300 mg by mouth every 8 hours as needed    Chronic low back pain          biotin supplements   may contain higher than normal levels of biotin that could interfere with   this test. Results from this test should be used and interpreted only   within the context of the overall clinical picture.     • WHITE BLOOD CELL COUNT 08/30/2019 6.6  4.0 - 10.0 10*3/uL Final   • RED BLOOD CELL COUNT 08/30/2019 4.18  3.70 - 5.20 10*6/uL Final   • HEMOGLOBIN 08/30/2019 12.1  11.2 - 15.7 g/dL Final   • HEMATOCRIT 08/30/2019 37.7  34.0 - 45.0 % Final   • MEAN CORPUSCULAR VOLUME 08/30/2019 90.2  79.0 - 95.0 fL Final   • MEAN CORPUSCULAR HGB 08/30/2019 28.9  27.0 - 34.0 pg Final   • MEAN CORPUSCULAR HGB CONCENTRATION 08/30/2019 32.1  32.0 - 36.0 % Final   • PLATELET COUNT 08/30/2019 199  150 - 400 10*3/uL Final   • MEAN PLATELET VOLUME 08/30/2019 13.6* 8.6 - 12.4 fL Final   • RED CELL DISTRIBUTION WIDTH 08/30/2019 15.2* 11.3 - 14.8 % Final   • NEUTROPHIL PERCENT 08/30/2019 65.3  34.0 - 73.5 % Final   • NEUTROPHIL ABSOLUTE # 08/30/2019 4.3  1.4 - 6.5 10*3/uL Final   • LYMPH PERCENT 08/30/2019 23.5  20.5 - 51.1 % Final   • LYMPHOCYTE ABSOLUTE # 08/30/2019 1.6  1.2 - 3.4 10*3/uL Final   • MONOCYTE PERCENT 08/30/2019 5.9  4.3 - 12.9 % Final   • MONOCYTE ABSOLUTE # 08/30/2019 0.4  0.2 - 0.9 10*3/uL Final   • EOSINOPHIL % 08/30/2019 4.8  0.0 - 10.0 % Final   • EOSINOPHIL ABSOLUTE # 08/30/2019 0.3  0.0 - 0.5 10*3/uL Final   • BASOPHIL % 08/30/2019 0.5  0.0 - 1.2 % Final   • BASOPHIL ABSOLUTE # 08/30/2019 0.0  0.0 - 0.1 10*3/uL Final   • DIFFERENTIAL TYPE 08/30/2019 AUTO DIFF   Final   • PROTEIN, TOTAL SERUM 08/30/2019 7.5  6.4 - 8.5 g/dL Final   • NA (SODIUM, SERUM) 08/30/2019 136  136 - 146 mmol/L Final   • K (POTASSIUM, SERUM) 08/30/2019 4.5  3.5 - 5.3 mmol/L Final   • GLUCOSE, RANDOM 08/30/2019 85  70 - 200 mg/dL Final   • CREATININE, SERUM 08/30/2019 0.7  0.5 - 1.4 mg/dL Final   • CO2 VENOUS 08/30/2019 28  22 - 32 mmol/L Final   • CHLORIDE, SERUM 08/30/2019 100  96 - 107 mmol/L Final   • CALCIUM, SERUM  08/30/2019 9.8  8.6 - 10.6 mg/dL Final   • BLOOD UREA NITROGEN 08/30/2019 16  7 - 20 mg/dL Final   • ASPARTATE AMINOTRNSFRASE(SGOT) 08/30/2019 24  14 - 43 U/L Final   • BILIRUBIN, TOTAL 08/30/2019 0.3  0.0 - 1.3 mg/dL Final   • ALANINE AMINOTRANSFERASE(SGPT) 08/30/2019 15  4 - 38 U/L Final   • ALKALINE PHOSPHATASE([id number]) 08/30/2019 72  45 - 130 U/L Final   • ALBUMIN, SERUM 08/30/2019 4.4  3.6 - 5.1 g/dL Final   • EGFR*  08/30/2019 >60  >60 mL/min/[1.73m2] Final   • EGFR* NON- 08/30/2019 >60  >60 mL/min/[1.73m2] Final   • HEMOGLOBIN A1C 08/30/2019 7.2* 4.2 - 6.0 % Final   • ESTIMATED AVERAGE GLUCOSE 08/30/2019 161* 0 - 154 mg/dL Final   • HEP C AB 08/30/2019 NEGATIVE  NEGATIVE Final   • POC GLUC 10/18/2019 101.00* 66.00 - 97.00 mg/dL Final   Orders Only on 05/18/2019   Component Date Value Ref Range Status   • TSH 05/18/2019 1.16  0.30 - 4.82 m[iU]/L Final    Comment: Specimens from patients who are taking high dose of biotin supplements   may contain higher than normal levels of biotin that could interfere with   this test. Results from this test should be used and interpreted only   within the context of the overall clinical picture.     • MICROALBUMIN, URINE 05/18/2019 5.1  mg/L Final   • CREATININE, URINE RANDOM 05/18/2019 62.9  mg/dL Final   • MICROALBUMIN/CREATININE RATIO 05/18/2019 8.1  0.0 - 30.0 mg/G Final   • CHOLESTEROL, TOTAL 05/18/2019 155  140 - 200 mg/dL Final   • HDL CHOLESTEROL 05/18/2019 66  >40 mg/dL Final   • LDL CHOL, CALCULATED 05/18/2019 74  30 - 100 mg/dL Final   • TRIGLYCERIDES 05/18/2019 76  0 - 200 mg/dL Final   • HEMOGLOBIN A1C 05/18/2019 6.1* 4.2 - 6.0 % Final   • ESTIMATED AVERAGE GLUCOSE 05/18/2019 130  0 - 154 mg/dL Final   • PROTEIN, TOTAL SERUM 05/18/2019 7.7  6.4 - 8.5 g/dL Final   • NA (SODIUM, SERUM) 05/18/2019 137  136 - 146 mmol/L Final   • K (POTASSIUM, SERUM) 05/18/2019 4.7  3.5 - 5.3 mmol/L Final   • GLUCOSE, RANDOM 05/18/2019 131  70 - 200  mg/dL Final   • CREATININE, SERUM 05/18/2019 0.7  0.5 - 1.4 mg/dL Final   • CO2 VENOUS 05/18/2019 27  22 - 32 mmol/L Final   • CHLORIDE, SERUM 05/18/2019 102  96 - 107 mmol/L Final   • CALCIUM, SERUM 05/18/2019 9.8  8.6 - 10.6 mg/dL Final   • BLOOD UREA NITROGEN 05/18/2019 15  7 - 20 mg/dL Final   • ASPARTATE AMINOTRNSFRASE(SGOT) 05/18/2019 28  14 - 43 U/L Final   • BILIRUBIN, TOTAL 05/18/2019 0.5  0.0 - 1.3 mg/dL Final   • ALANINE AMINOTRANSFERASE(SGPT) 05/18/2019 18  4 - 38 U/L Final   • ALKALINE PHOSPHATASE([id number]) 05/18/2019 86  45 - 130 U/L Final   • ALBUMIN, SERUM 05/18/2019 4.3  3.6 - 5.1 g/dL Final   • EGFR*  05/18/2019 >60  >60 mL/min/[1.73m2] Final   • EGFR* NON- 05/18/2019 >60  >60 mL/min/[1.73m2] Final   • WHITE BLOOD CELL COUNT 05/18/2019 4.4  4.0 - 10.0 10*3/uL Final   • RED BLOOD CELL COUNT 05/18/2019 3.98  3.70 - 5.20 10*6/uL Final   • HEMOGLOBIN 05/18/2019 12.0  11.2 - 15.7 g/dL Final   • HEMATOCRIT 05/18/2019 34.9  34.0 - 45.0 % Final   • MEAN CORPUSCULAR VOLUME 05/18/2019 87.7  79.0 - 95.0 fL Final   • MEAN CORPUSCULAR HGB 05/18/2019 30.2  27.0 - 34.0 pg Final   • MEAN CORPUSCULAR HGB CONCENTRATION 05/18/2019 34.4  32.0 - 36.0 % Final   • PLATELET COUNT 05/18/2019 226  150 - 400 10*3/uL Final   • MEAN PLATELET VOLUME 05/18/2019 12.7* 8.6 - 12.4 fL Final   • RED CELL DISTRIBUTION WIDTH 05/18/2019 14.4  11.3 - 14.8 % Final   • NEUTROPHIL PERCENT 05/18/2019 64.9  34.0 - 73.5 % Final   • NEUTROPHIL ABSOLUTE # 05/18/2019 2.9  1.4 - 6.5 10*3/uL Final   • LYMPH PERCENT 05/18/2019 24.3  20.5 - 51.1 % Final   • LYMPHOCYTE ABSOLUTE # 05/18/2019 1.1* 1.2 - 3.4 10*3/uL Final   • MONOCYTE PERCENT 05/18/2019 8.3  4.3 - 12.9 % Final   • MONOCYTE ABSOLUTE # 05/18/2019 0.4  0.2 - 0.9 10*3/uL Final   • EOSINOPHIL % 05/18/2019 2.0  0.0 - 10.0 % Final   • EOSINOPHIL ABSOLUTE # 05/18/2019 0.1  0.0 - 0.5 10*3/uL Final   • BASOPHIL % 05/18/2019 0.5  0.0 - 1.2 % Final   • BASOPHIL  ABSOLUTE # 05/18/2019 0.0  0.0 - 0.1 10*3/uL Final   • DIFFERENTIAL TYPE 05/18/2019 AUTO DIFF   Final   • AP REPORT 10/18/2019 AP results   Final    Comment: ------------------------------------------------------------  SURGICAL PATHOLOGY REPORT     [id number]  ------------------------------------------------------------  Final Pathologic Diagnosis:  A. STOMACH; BIOPSY:  1. Chronic gastritis with reactive changes and focal activity  2. Organisms compatible with Helicobacter species noted on H/E stain  3. Focal intestinal metaplasia noted without dysplasia (1 of 4 fragments,   less than 2% of tissue)     B. COLON, ASCENDING, POLYP; BIOPSY:  - Tubular adenoma without high grade dysplasia     Electronically Signed by:  ROYCE AMARAL MD 10/22/2019     CLINICAL HISTORY/INFORMATION:  GERD, Screening     SPECIMEN SOURCE:  A. BIOPSY; Gastric  B. BIOPSY; Ascending polyp     GROSS DESCRIPTION:  A. Received labeled gastric, are 4 fragments of tan soft tissue   aggregating 4 mm. Entirely submitted in one cassette.  B. Received labeled ascending polyp, are 3 fragments of tan soft tissue   aggregating to 6 mm. Entirely submitted in one cassette.                                e requisition and specimen container demographics and identifications   are checked and verified for the specimen(s). A complete microscopic   study is performed.  The immunohistochemical, FISH or GIANA reagents (if   any) utilized in this test were developed and their performance   characteristics determined by [employer].  Some of the   immunohistochemical reagents have not been cleared or approved by the [place]   Food and Drug Administration. The FDA has determined that such clearance   or approval is not necessary. This test is used for clinical purposes. It   should not be regarded as investigational or for research. This   laboratory is certified under the Clinical Laboratory Improvement   Amendments of 1988 (CLIA) as qualified to perform high  complexity   clinical laboratory testing. The appropriate controls were run and show   appropriate reactivity. Since FISH and/or immunohistochemistry for   estrogen receptor, progesterone receptor, and HER2/juana have not been   validated on                            decalcified tissue, such results should be interpreted with   caution given the likelihood of false negativity.              STUDIES:  ECG 9/2/2019: (Study independently reviewed)  Sinus rhythm    Stress Test 9/20/2019:  1. Normal myocardial perfusion examination.   2. The overall quality of the study is good.  3. Normal perfusion imaging without evidence of inducible ischemia or infarct.  4. Normal left ventricular volume with normal systolic thickening and function and an ejection fraction of 67%.  5. No previous study was available for comparison    Chest Xray 11/2/2019:  1.  Borderline cardiac enlargement with atherosclerotic change in the thoracic aorta.  2.  No acute infiltrate is seen.    ASSESSMENT/PLAN:  Chest Pain  -Has both typical and atypical features  -Given history of diabetes and hypertension as well as family history of cardiovascular disease, despite stress testing being normal, I would be concerned that she is having unstable angina.  -Evidence of atherosclerosis in the thoracic aorta  -Will schedule a cardiac cath with possible intervention.  The potential procedural risks and complications including: bleeding, vascular complications, kidney failure, TIA/CVA, allergic reaction, infection, death, need for blood transfusion, need for urgent CABG were discussed at length with the patient and her family. Patient verbalized understanding of the risks and benefits of the proposed procedure and agreed to proceed.  -We will check echocardiogram  -Continue aspirin and statin    Cardiomegaly   -On chest x-ray  -We will check echocardiogram    HTN  -BP is controlled - goal <140/90  -Continue current BP meds  -low sodium diet    LIPIDS:   -cont  simvastatin  -LDL at goal  -Low-fat diet    Preventive Cardiology:   - Diet: instructed pt to follow a low salt, low fat   - Does patient smoke: No Smoking cessation education was not provided.  - Angely's BMI is Body mass index is 26.45 kg/m²., which is which is within normal parameters.(Ages 18-64 >/= 18.5 and <25; Over age 65 >/= 23 and <30)  - Does patient exercise? Yes Was counseling given: Yes  - Use of Aspirin for Primary Prevention: Patient is already on aspirin, risks and benefits discussed.    RTC pending cardiac catheterization.    Thank you for allowing me to participate in the care of this patient.  Please do not hesitate to call me for any further questions.      Tanner Salazar MD  11/12/2019

## 2020-01-08 ENCOUNTER — MYC MEDICAL ADVICE (OUTPATIENT)
Dept: FAMILY MEDICINE | Facility: CLINIC | Age: 60
End: 2020-01-08

## 2020-01-08 NOTE — LETTER
January 8, 2020      Joana Arredondo  [address] [place] [address]        RE: Joana Arredondo,      To Whom It May Concern:     This is to certify that you are being evaluated and treated for muscular low back pain. This appears to be soft tissue related, and it may be that massages will give relief. I think it is medically reasonable to use your health care savings account for therapeutic massage as needed.    Sincerely,        Marybel Aguiar MD

## 2020-01-08 NOTE — TELEPHONE ENCOUNTER
Please see mychart.  A letter was done for her 1-2-19, that had been copied to a pending letter for your review.    Routing to provider.  Lili WOLFE RN

## 2020-02-16 ASSESSMENT — ENCOUNTER SYMPTOMS
EYE PAIN: 0
BREAST MASS: 0
NAUSEA: 0
ARTHRALGIAS: 0
DIARRHEA: 0
SHORTNESS OF BREATH: 0
FEVER: 0
DYSURIA: 0
PARESTHESIAS: 0
HEMATURIA: 0
HEMATOCHEZIA: 0
ABDOMINAL PAIN: 0
CHILLS: 0
SORE THROAT: 0
HEARTBURN: 0
JOINT SWELLING: 0
DIZZINESS: 0
MYALGIAS: 0
FREQUENCY: 0
PALPITATIONS: 0
CONSTIPATION: 0
HEADACHES: 0
NERVOUS/ANXIOUS: 1
COUGH: 0
WEAKNESS: 0

## 2020-02-19 ENCOUNTER — OFFICE VISIT (OUTPATIENT)
Dept: FAMILY MEDICINE | Facility: CLINIC | Age: 60
End: 2020-02-19
Payer: COMMERCIAL

## 2020-02-19 ENCOUNTER — RESULT FOLLOW UP (OUTPATIENT)
Dept: FAMILY MEDICINE | Facility: CLINIC | Age: 60
End: 2020-02-19

## 2020-02-19 VITALS
TEMPERATURE: 98.3 F | SYSTOLIC BLOOD PRESSURE: 110 MMHG | RESPIRATION RATE: 16 BRPM | HEIGHT: 66 IN | DIASTOLIC BLOOD PRESSURE: 60 MMHG | OXYGEN SATURATION: 99 % | HEART RATE: 78 BPM | BODY MASS INDEX: 36.67 KG/M2 | WEIGHT: 228.2 LBS

## 2020-02-19 DIAGNOSIS — F32.5 MAJOR DEPRESSION IN COMPLETE REMISSION (H): ICD-10-CM

## 2020-02-19 DIAGNOSIS — R87.810 CERVICAL HIGH RISK HPV (HUMAN PAPILLOMAVIRUS) TEST POSITIVE: ICD-10-CM

## 2020-02-19 DIAGNOSIS — Z71.89 ADVANCED DIRECTIVES, COUNSELING/DISCUSSION: ICD-10-CM

## 2020-02-19 DIAGNOSIS — Z12.4 SCREENING FOR MALIGNANT NEOPLASM OF CERVIX: ICD-10-CM

## 2020-02-19 DIAGNOSIS — E66.01 MORBID OBESITY (H): ICD-10-CM

## 2020-02-19 DIAGNOSIS — F41.9 ANXIETY: ICD-10-CM

## 2020-02-19 DIAGNOSIS — Z00.00 ROUTINE GENERAL MEDICAL EXAMINATION AT A HEALTH CARE FACILITY: Primary | ICD-10-CM

## 2020-02-19 DIAGNOSIS — Z11.51 SCREENING FOR HUMAN PAPILLOMAVIRUS: ICD-10-CM

## 2020-02-19 PROCEDURE — G0145 SCR C/V CYTO,THINLAYER,RESCR: HCPCS | Performed by: FAMILY MEDICINE

## 2020-02-19 PROCEDURE — 99396 PREV VISIT EST AGE 40-64: CPT | Performed by: FAMILY MEDICINE

## 2020-02-19 PROCEDURE — 87624 HPV HI-RISK TYP POOLED RSLT: CPT | Performed by: FAMILY MEDICINE

## 2020-02-19 RX ORDER — FLUOXETINE 40 MG/1
40 CAPSULE ORAL DAILY
Qty: 90 CAPSULE | Refills: 4 | Status: SHIPPED | OUTPATIENT
Start: 2020-02-19 | End: 2021-04-23

## 2020-02-19 RX ORDER — LORAZEPAM 0.5 MG/1
.5-1 TABLET ORAL EVERY 8 HOURS PRN
Qty: 20 TABLET | Refills: 1 | Status: SHIPPED | OUTPATIENT
Start: 2020-02-19 | End: 2021-07-28

## 2020-02-19 ASSESSMENT — ENCOUNTER SYMPTOMS
BREAST MASS: 0
COUGH: 0
ARTHRALGIAS: 0
JOINT SWELLING: 0
NAUSEA: 0
WEAKNESS: 0
CHILLS: 0
HEADACHES: 0
FREQUENCY: 0
MYALGIAS: 0
NERVOUS/ANXIOUS: 1
SORE THROAT: 0
SHORTNESS OF BREATH: 0
HEMATURIA: 0
EYE PAIN: 0
PALPITATIONS: 0
DIARRHEA: 0
ABDOMINAL PAIN: 0
HEMATOCHEZIA: 0
DYSURIA: 0
FEVER: 0
PARESTHESIAS: 0
DIZZINESS: 0
HEARTBURN: 0
CONSTIPATION: 0

## 2020-02-19 ASSESSMENT — PAIN SCALES - GENERAL: PAINLEVEL: NO PAIN (0)

## 2020-02-19 ASSESSMENT — ANXIETY QUESTIONNAIRES
6. BECOMING EASILY ANNOYED OR IRRITABLE: NOT AT ALL
1. FEELING NERVOUS, ANXIOUS, OR ON EDGE: SEVERAL DAYS
5. BEING SO RESTLESS THAT IT IS HARD TO SIT STILL: SEVERAL DAYS
7. FEELING AFRAID AS IF SOMETHING AWFUL MIGHT HAPPEN: NOT AT ALL
2. NOT BEING ABLE TO STOP OR CONTROL WORRYING: SEVERAL DAYS
3. WORRYING TOO MUCH ABOUT DIFFERENT THINGS: NOT AT ALL
GAD7 TOTAL SCORE: 3

## 2020-02-19 ASSESSMENT — PATIENT HEALTH QUESTIONNAIRE - PHQ9
SUM OF ALL RESPONSES TO PHQ QUESTIONS 1-9: 3
5. POOR APPETITE OR OVEREATING: NOT AT ALL

## 2020-02-19 ASSESSMENT — MIFFLIN-ST. JEOR: SCORE: 1631.86

## 2020-02-19 NOTE — PROGRESS NOTES
SUBJECTIVE:   CC: Joana Arredondo is an 59 year old woman who presents for preventive health visit.     Healthy Habits:     Getting at least 3 servings of Calcium per day:  Yes    Bi-annual eye exam:  Yes    Dental care twice a year:  Yes    Sleep apnea or symptoms of sleep apnea:  None    Diet:  Regular (no restrictions)    Frequency of exercise:  2-3 days/week    Duration of exercise:  Less than 15 minutes    Taking medications regularly:  Yes    Medication side effects:  None    PHQ-2 Total Score: 2    Additional concerns today:  No          Chief Complaint   Patient presents with     Physical     Depression     /anxiety med recheck and refills     Derm Problem     dry spot on left upper thigh.         Today's PHQ-2 Score:   PHQ-2 (  [employer]) 2020   Q1: Little interest or pleasure in doing things 1   Q2: Feeling down, depressed or hopeless 1   PHQ-2 Score 2   Q1: Little interest or pleasure in doing things Several days   Q2: Feeling down, depressed or hopeless Several days   PHQ-2 Score 2       Abuse: Current or Past(Physical, Sexual or Emotional)- No  Do you feel safe in your environment? Yes    Have you ever done Advance Care Planning? (For example, a Health Directive, POLST, or a discussion with a medical provider or your loved ones about your wishes): No, advance care planning information given to patient to review.  Patient plans to discuss their wishes with loved ones or provider.      Social History     Tobacco Use     Smoking status: Former Smoker     Packs/day: 0.50     Years: 20.00     Pack years: 10.00     Types: Cigarettes     Last attempt to quit: 2016     Years since quittin.1     Smokeless tobacco: Never Used     Tobacco comment: using E-cig   Substance Use Topics     Alcohol use: No         Alcohol Use 2020   Prescreen: >3 drinks/day or >7 drinks/week? No   Prescreen: >3 drinks/day or >7 drinks/week? -       Reviewed orders with patient.  Reviewed health maintenance and  updated orders accordingly - Yes  Labs reviewed in EPIC  Patient Active Problem List   Diagnosis     Chronic low back pain     NICOLETTE (obstructive sleep apnea)-Mild (AHI 6)     CARDIOVASCULAR SCREENING; LDL GOAL LESS THAN 130     Major depression in complete remission (H)     BMI 38.0-38.9,adult     Anxiety     Mixed stress and urge urinary incontinence     Controlled substance agreement signed     Advanced directives, counseling/discussion     Morbid obesity (H)     Cervical high risk HPV (human papillomavirus) test positive     Past Surgical History:   Procedure Laterality Date     COLONOSCOPY  10/22/2012    Procedure: COLONOSCOPY;  Colonoscopy;  Surgeon: Robert Encinas MD;  Location: [facility]     CONIZATION LEEP       ENT SURGERY       LAPAROSCOPIC CHOLECYSTECTOMY  10/21/2010    LAPAROSCOPIC CHOLECYSTECTOMY performed by DAMIR WING at [facility]     SURGICAL HISTORY OF -       Rt wrist surgery     SURGICAL HISTORY OF -       breast augmentation     SURGICAL HISTORY OF -   10/15/2010    EUS - GB bulding w/sludge, Nl bile duct w/no choledocholithiasis or sludge.      SURGICAL HISTORY OF -   2006    1.  Left shoulder arthroscopy with arthroscopic subacromial decompression.      TONSILLECTOMY & ADENOIDECTOMY  as child    tonsils and adenoids       Social History     Tobacco Use     Smoking status: Former Smoker     Packs/day: 0.50     Years: 20.00     Pack years: 10.00     Types: Cigarettes     Last attempt to quit: 2016     Years since quittin.1     Smokeless tobacco: Never Used     Tobacco comment: using E-cig   Substance Use Topics     Alcohol use: No     Family History   Problem Relation Age of Onset     Diabetes Father         type 2     C.A.D. Father      Heart Disease Father         had 2 heart attacks,68 for first one 79 on second     Cancer - colorectal Paternal Grandfather      Diabetes Sister         type 2         Current Outpatient Medications   Medication Sig Dispense Refill      acetaminophen (TYLENOL ARTHRITIS PAIN) 650 MG CR tablet Take 1,300 mg by mouth every 8 hours as needed        CALCIUM + D OR 1 TABLET DAILY       fluocinonide-emollient (LIDEX-E) 0.05 % cream Apply to affected area of elbows twice daily as needed. 30 g 3     FLUoxetine 40 MG PO capsule Take 1 capsule (40 mg) by mouth daily 90 capsule 4     GLUCOSAMINE-CHONDROIT-CALCIUM PO Take 2 tablets by mouth daily       ibuprofen 200 MG capsule Take 200 mg by mouth every 4 hours as needed for fever       Ibuprofen-diphenhydrAMINE Cit (ADVIL PM) 200-38 MG PO TABS Take 1 tablet by mouth nightly as needed       LORazepam (ATIVAN) 0.5 MG PO tablet Take 1-2 tablets (0.5-1 mg) by mouth every 8 hours as needed for anxiety 20 tablet 1     MULTIVITAMINS OR 1 daily        acyclovir (ZOVIRAX) 5 % cream Apply  topically. Apply to affected area thinly five times daily at the first sign of symptoms. (Patient not taking: Reported on 2/19/2020) 5 g 2     HYDROcodone-acetaminophen (NORCO)  MG per tablet Take 0.5-1 tablets by mouth every evening as needed for pain Take 1/2 - 1 tablet at night as needed for back pain. (Patient not taking: Reported on 2/19/2020) 30 tablet 0     methocarbamol (ROBAXIN) 750 MG tablet Take 1-1.5 tablets (750-1,125 mg) by mouth 3 times daily as needed for muscle spasms (Patient not taking: Reported on 2/19/2020) 45 tablet 1     naproxen sodium (ALEVE) 220 MG capsule Take 220 mg by mouth 2 times daily as needed       tolterodine (DETROL LA) 2 MG 24 hr capsule Take 1 capsule (2 mg) by mouth daily (Patient not taking: Reported on 9/14/2018) 30 capsule 11     Allergies   Allergen Reactions     Nkda [No Known Drug Allergies]        Mammogram Screening: Patient over age 50, mutual decision to screen reflected in health maintenance.    Pertinent mammograms are reviewed under the imaging tab.  History of abnormal Pap smear:   NO - age 30-65 PAP every 5 years with negative HPV co-testing recommended  Last 3 Pap and HPV  Results:     PAP / HPV 1/13/2014 6/25/2009 12/3/2007   PAP NIL NIL NIL     Reviewed and updated as needed this visit by clinical staff  Tobacco  Allergies  Meds         Reviewed and updated as needed this visit by Provider        Past Medical History:   Diagnosis Date     Cervical high risk HPV (human papillomavirus) test positive 02/18/2020    See problem list     Neoplasm of uncertain behavior of skin 3/13/2012     Tobacco use disorder 11/30/2005    Quit October 2007      Past Surgical History:   Procedure Laterality Date     COLONOSCOPY  10/22/2012    Procedure: COLONOSCOPY;  Colonoscopy;  Surgeon: Robert Encinas MD;  Location: [facility]     CONIZATION LEEP  1983     ENT SURGERY       LAPAROSCOPIC CHOLECYSTECTOMY  10/21/2010    LAPAROSCOPIC CHOLECYSTECTOMY performed by DAMIR WING at [facility]     SURGICAL HISTORY OF -   1973    Rt wrist surgery     SURGICAL HISTORY OF -   2004    breast augmentation     SURGICAL HISTORY OF -   10/15/2010    EUS - GB bulding w/sludge, Nl bile duct w/no choledocholithiasis or sludge.      SURGICAL HISTORY OF -   2/9/2006    1.  Left shoulder arthroscopy with arthroscopic subacromial decompression.      TONSILLECTOMY & ADENOIDECTOMY  as child    tonsils and adenoids       Review of Systems   Constitutional: Negative for chills and fever.   HENT: Negative for congestion, ear pain, hearing loss and sore throat.    Eyes: Negative for pain and visual disturbance.   Respiratory: Negative for cough and shortness of breath.    Cardiovascular: Negative for chest pain, palpitations and peripheral edema.   Gastrointestinal: Negative for abdominal pain, constipation, diarrhea, heartburn, hematochezia and nausea.   Breasts:  Negative for tenderness, breast mass and discharge.   Genitourinary: Positive for urgency. Negative for dysuria, frequency, genital sores, hematuria, pelvic pain, vaginal bleeding and vaginal discharge.   Musculoskeletal: Negative for arthralgias, joint swelling and  "myalgias.   Skin: Negative for rash.   Neurological: Negative for dizziness, weakness, headaches and paresthesias.   Psychiatric/Behavioral: Positive for mood changes. The patient is nervous/anxious.         OBJECTIVE:   /60 (BP Location: Right arm, Cuff Size: Adult Large)   Pulse 78   Temp 98.3  F (36.8  C) (Tympanic)   Resp 16   Ht 1.676 m (5' 6\")   Wt 103.5 kg (228 lb 3.2 oz)   LMP 03/06/2011   SpO2 99%   Breastfeeding No   BMI 36.83 kg/m    Physical Exam  GENERAL: healthy, alert and no distress  EYES: Eyes grossly normal to inspection, PERRL and conjunctivae and sclerae normal  HENT: ear canals and TM's normal, nose and mouth without ulcers or lesions  NECK: no adenopathy, no asymmetry, masses, or scars and thyroid normal to palpation  RESP: lungs clear to auscultation - no rales, rhonchi or wheezes  BREAST: normal without masses, tenderness or nipple discharge and no palpable axillary masses or adenopathy  CV: regular rate and rhythm, normal S1 S2, no S3 or S4, no murmur, click or rub, no peripheral edema and peripheral pulses strong  ABDOMEN: soft, nontender, no hepatosplenomegaly, no masses and bowel sounds normal   (female): normal female external genitalia, normal urethral meatus, vaginal mucosa pink, moist, well rugated, and normal cervix/adnexa/uterus without masses or discharge  MS: no gross musculoskeletal defects noted, no edema  SKIN: no suspicious lesions or rashes  NEURO: Normal strength and tone, mentation intact and speech normal  PSYCH: mentation appears normal, affect normal/bright      ASSESSMENT/PLAN:   1. Routine general medical examination at a health care facility    2. Major depression in complete remission (H)  Well controlled. Refilled medication.     - FLUoxetine 40 MG PO capsule; Take 1 capsule (40 mg) by mouth daily  Dispense: 90 capsule; Refill: 4    3. Morbid obesity (H)  Working on lifestyle changes.     4. Anxiety  Well controlled. Refilled medication.     - " "FLUoxetine 40 MG PO capsule; Take 1 capsule (40 mg) by mouth daily  Dispense: 90 capsule; Refill: 4  - LORazepam (ATIVAN) 0.5 MG PO tablet; Take 1-2 tablets (0.5-1 mg) by mouth every 8 hours as needed for anxiety  Dispense: 20 tablet; Refill: 1    5. Screening for human papillomavirus  - HPV High Risk Types DNA Cervical    6. Screening for malignant neoplasm of cervix  - PAP imaged thin layer screen    7. Advanced directives, counseling/discussion      If depression worsening then could consider increasing fluoxetine to 60mg.  COUNSELING:  Reviewed preventive health counseling, as reflected in patient instructions    Estimated body mass index is 36.83 kg/m  as calculated from the following:    Height as of this encounter: 1.676 m (5' 6\").    Weight as of this encounter: 103.5 kg (228 lb 3.2 oz).    Weight management plan: Discussed healthy diet and exercise guidelines     reports that she quit smoking about 4 years ago. Her smoking use included cigarettes. She has a 10.00 pack-year smoking history. She has never used smokeless tobacco.      Counseling Resources:  ATP IV Guidelines  Pooled Cohorts Equation Calculator  Breast Cancer Risk Calculator  FRAX Risk Assessment  ICSI Preventive Guidelines  Dietary Guidelines for Americans, 2010  USDA's MyPlate  ASA Prophylaxis  Lung CA Screening    Marybel Aguiar MD  [facility]    "

## 2020-02-19 NOTE — PATIENT INSTRUCTIONS
Shingrix is the new shingles vaccine. It is typically a pharmacy benefit under most insurances. The [facility] pharmacy can check your insurance and give it if it's covered and you don't need an appointment to do this.      Preventive Health Recommendations  Female Ages 50 - 64    Yearly exam: See your health care provider every year in order to  o Review health changes.   o Discuss preventive care.    o Review your medicines if your doctor has prescribed any.      Get a Pap test every three years (unless you have an abnormal result and your provider advises testing more often).    If you get Pap tests with HPV test, you only need to test every 5 years, unless you have an abnormal result.     You do not need a Pap test if your uterus was removed (hysterectomy) and you have not had cancer.    You should be tested each year for STDs (sexually transmitted diseases) if you're at risk.     Have a mammogram every 1 to 2 years.    Have a colonoscopy at age 50, or have a yearly FIT test (stool test). These exams screen for colon cancer.      Have a cholesterol test every 5 years, or more often if advised.    Have a diabetes test (fasting glucose) every three years. If you are at risk for diabetes, you should have this test more often.     If you are at risk for osteoporosis (brittle bone disease), think about having a bone density scan (DEXA).    Shots: Get a flu shot each year. Get a tetanus shot every 10 years.    Nutrition:     Eat at least 5 servings of fruits and vegetables each day.    Eat whole-grain bread, whole-wheat pasta and brown rice instead of white grains and rice.    Get adequate Calcium and Vitamin D.     Lifestyle    Exercise at least 150 minutes a week (30 minutes a day, 5 days a week). This will help you control your weight and prevent disease.    Limit alcohol to one drink per day.    No smoking.     Wear sunscreen to prevent skin cancer.     See your dentist every six months for an exam and  cleaning.    See your eye doctor every 1 to 2 years.

## 2020-02-20 ASSESSMENT — ANXIETY QUESTIONNAIRES: GAD7 TOTAL SCORE: 3

## 2020-02-21 LAB
COPATH REPORT: NORMAL
PAP: NORMAL

## 2020-02-25 LAB
FINAL DIAGNOSIS: ABNORMAL
HPV HR 12 DNA CVX QL NAA+PROBE: NEGATIVE
HPV16 DNA SPEC QL NAA+PROBE: NEGATIVE
HPV18 DNA SPEC QL NAA+PROBE: POSITIVE
SPECIMEN DESCRIPTION: ABNORMAL
SPECIMEN SOURCE CVX/VAG CYTO: ABNORMAL

## 2020-02-26 NOTE — PROGRESS NOTES
2003, 2005, 2007, 2009 NIL paps.  2014 NIL pap, Neg HPV.  2/19/20 NIL pap with + HR HPV 18. Plan colp.(MAHNAZ)  2/26/20 Pt notified by phone. (lizy)  06/08/20 [place] appt  06/15/20 Result Follow Up Encounter closed, now tracking in Cervical Cytology Tracker.

## 2020-04-09 ENCOUNTER — E-VISIT (OUTPATIENT)
Dept: FAMILY MEDICINE | Facility: CLINIC | Age: 60
End: 2020-04-09
Payer: COMMERCIAL

## 2020-04-09 DIAGNOSIS — M54.50 CHRONIC LOW BACK PAIN WITHOUT SCIATICA, UNSPECIFIED BACK PAIN LATERALITY: ICD-10-CM

## 2020-04-09 DIAGNOSIS — G89.29 CHRONIC LOW BACK PAIN WITHOUT SCIATICA, UNSPECIFIED BACK PAIN LATERALITY: ICD-10-CM

## 2020-04-09 PROCEDURE — 99421 OL DIG E/M SVC 5-10 MIN: CPT | Performed by: FAMILY MEDICINE

## 2020-04-09 RX ORDER — HYDROCODONE BITARTRATE AND ACETAMINOPHEN 10; 325 MG/1; MG/1
.5-1 TABLET ORAL
Qty: 30 TABLET | Refills: 0 | Status: SHIPPED | OUTPATIENT
Start: 2020-04-09 | End: 2020-08-12

## 2020-04-09 NOTE — TELEPHONE ENCOUNTER
Provider E-Visit time total (minutes): 7 minutes    This patient was seen virtually during the COVID-19 outbreak in attempts to keep healthy patients out of the clinic per CDC guidelines (practicing social distancing).  I evaluated them by phone/evisit and the note reflects our conversation/visit.      Miguelina Field M.D.

## 2020-04-23 ENCOUNTER — MYC MEDICAL ADVICE (OUTPATIENT)
Dept: FAMILY MEDICINE | Facility: CLINIC | Age: 60
End: 2020-04-23

## 2020-04-23 NOTE — TELEPHONE ENCOUNTER
Pt unable to find letter in her Mychart.  Copy of letter mailed to pts home address.[email or web address] [email or web address]

## 2020-06-08 ENCOUNTER — OFFICE VISIT (OUTPATIENT)
Dept: OBGYN | Facility: CLINIC | Age: 60
End: 2020-06-08
Payer: COMMERCIAL

## 2020-06-08 VITALS
DIASTOLIC BLOOD PRESSURE: 67 MMHG | SYSTOLIC BLOOD PRESSURE: 109 MMHG | BODY MASS INDEX: 36.15 KG/M2 | HEART RATE: 59 BPM | TEMPERATURE: 97 F | WEIGHT: 224 LBS

## 2020-06-08 DIAGNOSIS — R87.810 CERVICAL HIGH RISK HPV (HUMAN PAPILLOMAVIRUS) TEST POSITIVE: Primary | ICD-10-CM

## 2020-06-08 PROCEDURE — 57455 BIOPSY OF CERVIX W/SCOPE: CPT | Performed by: OBSTETRICS & GYNECOLOGY

## 2020-06-08 PROCEDURE — 88305 TISSUE EXAM BY PATHOLOGIST: CPT | Performed by: OBSTETRICS & GYNECOLOGY

## 2020-06-08 NOTE — PROGRESS NOTES
Colposcopy/LEEP    Date/Time: 6/8/2020 9:50 AM  Performed by: Zahira Rojas MD  Authorized by: Zahira Rojas MD     Consent:     Consent obtained:  Written    Consent given by:  Patient    Procedural risks discussed:  Bleeding and repeat procedure    Patient questions answered: yes      Patient agrees, verbalizes understanding, and wants to proceed: yes      Educational handouts given: no      Instructions and paperwork completed: yes    Pre-procedure:     Pre-procedure timeout performed: yes      Prepped with: acetic acid    Indication:     Indication:  HPV    HPV Indications:  18  Procedure:     Procedure: Colposcopy w/ biopsy of cervix      Under satisfactory analgesia the patient was prepped and draped in the dorsal lithotomy position: yes      Epping speculum was placed in the vagina: yes      Under colposcopic examination the transition zone was seen in entirety: yes      Intracervical block was performed: no      Cervical biopsy performed with a cervical biopsy punch: yes      Tampon inserted: no      Monsel's solution was applied: yes      Biopsy(s): yes      Location:  4 oclock TZ    Specimen to pathology: yes    Post-procedure:     Findings: White epithelium      Impression: Normal appearing cervix      Patient tolerance of procedure:  Patient tolerated the procedure well with no immediate complications    Zahira Rojas MD  [facility]

## 2020-06-08 NOTE — NURSING NOTE
"Initial /67 (BP Location: Left arm, Patient Position: Chair, Cuff Size: Adult Large)   Pulse 59   Temp 97  F (36.1  C)   Wt 101.6 kg (224 lb)   LMP 03/06/2011   Breastfeeding No   BMI 36.15 kg/m   Estimated body mass index is 36.15 kg/m  as calculated from the following:    Height as of 2/19/20: 1.676 m (5' 6\").    Weight as of this encounter: 101.6 kg (224 lb). .    Lillie Rose MA    "

## 2020-06-10 LAB — COPATH REPORT: NORMAL

## 2020-06-15 ENCOUNTER — PATIENT OUTREACH (OUTPATIENT)
Dept: PEDIATRICS | Facility: CLINIC | Age: 60
End: 2020-06-15

## 2020-06-15 DIAGNOSIS — R87.810 CERVICAL HIGH RISK HPV (HUMAN PAPILLOMAVIRUS) TEST POSITIVE: ICD-10-CM

## 2020-06-15 NOTE — TELEPHONE ENCOUNTER
2003, 2005, 2007, 2009 NIL paps.  2014 NIL pap, Neg HPV.  2/19/20 NIL pap with + HR HPV 18. Plan colp.  2/26/20 Pt notified by phone.   06/08/20 [place]

## 2020-06-16 NOTE — TELEPHONE ENCOUNTER
Result released to [facility] on 6/10/20 and viewed by pt same day.   Uma Suazo RN  Pap Tracking

## 2020-08-12 ENCOUNTER — MYC REFILL (OUTPATIENT)
Dept: FAMILY MEDICINE | Facility: CLINIC | Age: 60
End: 2020-08-12

## 2020-08-12 DIAGNOSIS — G89.29 CHRONIC LOW BACK PAIN WITHOUT SCIATICA, UNSPECIFIED BACK PAIN LATERALITY: ICD-10-CM

## 2020-08-12 DIAGNOSIS — M54.50 CHRONIC LOW BACK PAIN WITHOUT SCIATICA, UNSPECIFIED BACK PAIN LATERALITY: ICD-10-CM

## 2020-08-12 DIAGNOSIS — N39.46 MIXED STRESS AND URGE URINARY INCONTINENCE: ICD-10-CM

## 2020-08-12 RX ORDER — TOLTERODINE 2 MG/1
2 CAPSULE, EXTENDED RELEASE ORAL DAILY
Qty: 30 CAPSULE | Refills: 11 | Status: SHIPPED | OUTPATIENT
Start: 2020-08-12 | End: 2021-04-23

## 2020-08-12 RX ORDER — METHOCARBAMOL 750 MG/1
750-1125 TABLET, FILM COATED ORAL 3 TIMES DAILY PRN
Qty: 45 TABLET | Refills: 1 | Status: SHIPPED | OUTPATIENT
Start: 2020-08-12 | End: 2021-10-15

## 2020-08-12 RX ORDER — HYDROCODONE BITARTRATE AND ACETAMINOPHEN 10; 325 MG/1; MG/1
.5-1 TABLET ORAL
Qty: 30 TABLET | Refills: 0 | Status: SHIPPED | OUTPATIENT
Start: 2020-08-12 | End: 2020-11-11

## 2020-08-12 NOTE — TELEPHONE ENCOUNTER
"Routing refill request for methocarbamol to provider for review/approval because: Drug not on the [facility] refill protocol     Routing refill request for tolterodine to provider for review/approval because:  Last ordered 8/22/2018 - nearly two years ago      Requested Prescriptions   Pending Prescriptions Disp Refills     methocarbamol (ROBAXIN) 750 MG tablet 45 tablet 1     Sig: Take 1-1.5 tablets (750-1,125 mg) by mouth 3 times daily as needed for muscle spasms       There is no refill protocol information for this order        tolterodine ER (DETROL LA) 2 MG 24 hr capsule 30 capsule 11     Sig: Take 1 capsule (2 mg) by mouth daily       Muscarinic Antagonists (Urinary Incontinence Agents) Passed - 8/12/2020 10:51 AM        Passed - Recent (12 mo) or future (30 days) visit within the authorizing provider's specialty     Patient has had an office visit with the authorizing provider or a provider within the authorizing providers department within the previous 12 mos or has a future within next 30 days. See \"Patient Info\" tab in inbasket, or \"Choose Columns\" in Meds & Orders section of the refill encounter.              Passed - Patient does not have a diagnosis of glaucoma on the problem list     If glaucoma diagnosis is new, refer refill to physician.          Passed - Medication is active on med list        Passed - Patient is 18 years of age or older           Linda SHAFER RN, BSN      "

## 2020-08-12 NOTE — TELEPHONE ENCOUNTER
Routing refill request to provider for review/approval because:  Drug not on the [facility] refill protocol     Linda SHAFER RN, BSN

## 2020-09-14 ENCOUNTER — ANCILLARY PROCEDURE (OUTPATIENT)
Dept: MAMMOGRAPHY | Facility: CLINIC | Age: 60
End: 2020-09-14
Attending: FAMILY MEDICINE
Payer: COMMERCIAL

## 2020-09-14 DIAGNOSIS — Z12.31 VISIT FOR SCREENING MAMMOGRAM: ICD-10-CM

## 2020-09-14 PROCEDURE — 77067 SCR MAMMO BI INCL CAD: CPT | Mod: TC

## 2020-11-03 ENCOUNTER — TELEPHONE (OUTPATIENT)
Dept: FAMILY MEDICINE | Facility: CLINIC | Age: 60
End: 2020-11-03

## 2020-11-03 DIAGNOSIS — M54.50 CHRONIC LOW BACK PAIN WITHOUT SCIATICA, UNSPECIFIED BACK PAIN LATERALITY: ICD-10-CM

## 2020-11-03 DIAGNOSIS — G89.29 CHRONIC LOW BACK PAIN WITHOUT SCIATICA, UNSPECIFIED BACK PAIN LATERALITY: ICD-10-CM

## 2020-11-03 NOTE — TELEPHONE ENCOUNTER
Requested Prescriptions   Pending Prescriptions Disp Refills     HYDROcodone-acetaminophen (NORCO)  MG per tablet [Pharmacy Med Name: HYDROCODONE-ACETAMINOPH  TABS] 30 tablet 0     Sig: TAKE ONE-HALF TO ONE TABLET BY MOUTH EVERY EVENING AS NEEDED FOR PAIN       There is no refill protocol information for this order

## 2020-11-04 RX ORDER — HYDROCODONE BITARTRATE AND ACETAMINOPHEN 10; 325 MG/1; MG/1
TABLET ORAL
Qty: 30 TABLET | Refills: 0 | OUTPATIENT
Start: 2020-11-04

## 2020-11-04 NOTE — TELEPHONE ENCOUNTER
Due for visit - last visit 2/2020.  Controlled substances like narcotics  require visit within the last 3 months. Please schedule for an office visit or virtual visit for medication check visit.  Refills will be addressed at that visit.

## 2020-11-11 ENCOUNTER — VIRTUAL VISIT (OUTPATIENT)
Dept: FAMILY MEDICINE | Facility: CLINIC | Age: 60
End: 2020-11-11
Payer: COMMERCIAL

## 2020-11-11 DIAGNOSIS — N39.46 MIXED STRESS AND URGE URINARY INCONTINENCE: Primary | ICD-10-CM

## 2020-11-11 DIAGNOSIS — G89.29 CHRONIC LOW BACK PAIN WITHOUT SCIATICA, UNSPECIFIED BACK PAIN LATERALITY: ICD-10-CM

## 2020-11-11 DIAGNOSIS — M54.50 CHRONIC LOW BACK PAIN WITHOUT SCIATICA, UNSPECIFIED BACK PAIN LATERALITY: ICD-10-CM

## 2020-11-11 PROCEDURE — 99213 OFFICE O/P EST LOW 20 MIN: CPT | Mod: 95 | Performed by: FAMILY MEDICINE

## 2020-11-11 RX ORDER — HYDROCODONE BITARTRATE AND ACETAMINOPHEN 10; 325 MG/1; MG/1
.5-1 TABLET ORAL
Qty: 30 TABLET | Refills: 0 | Status: SHIPPED | OUTPATIENT
Start: 2020-11-11 | End: 2021-02-12

## 2020-11-11 RX ORDER — OXYBUTYNIN CHLORIDE 5 MG/1
5 TABLET, EXTENDED RELEASE ORAL DAILY
Qty: 30 TABLET | Refills: 11 | Status: SHIPPED | OUTPATIENT
Start: 2020-11-11 | End: 2021-01-19

## 2020-11-11 ASSESSMENT — PATIENT HEALTH QUESTIONNAIRE - PHQ9
SUM OF ALL RESPONSES TO PHQ QUESTIONS 1-9: 0
5. POOR APPETITE OR OVEREATING: NOT AT ALL

## 2020-11-11 ASSESSMENT — ANXIETY QUESTIONNAIRES
6. BECOMING EASILY ANNOYED OR IRRITABLE: NOT AT ALL
2. NOT BEING ABLE TO STOP OR CONTROL WORRYING: NOT AT ALL
5. BEING SO RESTLESS THAT IT IS HARD TO SIT STILL: NOT AT ALL
1. FEELING NERVOUS, ANXIOUS, OR ON EDGE: NOT AT ALL
GAD7 TOTAL SCORE: 0
3. WORRYING TOO MUCH ABOUT DIFFERENT THINGS: NOT AT ALL
7. FEELING AFRAID AS IF SOMETHING AWFUL MIGHT HAPPEN: NOT AT ALL
IF YOU CHECKED OFF ANY PROBLEMS ON THIS QUESTIONNAIRE, HOW DIFFICULT HAVE THESE PROBLEMS MADE IT FOR YOU TO DO YOUR WORK, TAKE CARE OF THINGS AT HOME, OR GET ALONG WITH OTHER PEOPLE: NOT DIFFICULT AT ALL

## 2020-11-11 NOTE — PROGRESS NOTES
"Joana Arredondo is a 59 year old female who is being evaluated via a billable video visit.      The patient has been notified of following:     \"This video visit will be conducted via a call between you and your physician/provider. We have found that certain health care needs can be provided without the need for an in-person physical exam.  This service lets us provide the care you need with a video conversation.  If a prescription is necessary we can send it directly to your pharmacy.  If lab work is needed we can place an order for that and you can then stop by our lab to have the test done at a later time.    Video visits are billed at different rates depending on your insurance coverage.  Please reach out to your insurance provider with any questions.    If during the course of the call the physician/provider feels a video visit is not appropriate, you will not be charged for this service.\"    Patient has given verbal consent for Video visit? Yes  How would you like to obtain your AVS? MyChart  If you are dropped from the video visit, the video invite should be resent to: Text to cell phone: [phone]  Will anyone else be joining your video visit? No      Subjective     Joana Arredondo is a 59 year old female who presents today via video visit for the following health issues:    HPI     Chief Complaint   Patient presents with     Recheck Medication     Norco refilled     Medication Problem     Tolterodine. Not as effective. Pateint states it helps but that much. Are there any other meds that would work better?         Video Start Time: 10:22 AM        Review of Systems   Constitutional, neuro, ENT, endocrine, pulmonary, cardiac, gastrointestinal, genitourinary, musculoskeletal, integument and psychiatric systems are negative, except as otherwise noted.       Objective           Vitals:  No vitals were obtained today due to virtual visit.    Physical Exam     GENERAL: Healthy, alert and no " "distress  EYES: Eyes grossly normal to inspection.  No discharge or erythema, or obvious scleral/conjunctival abnormalities.  RESP: No audible wheeze, cough, or visible cyanosis.  No visible retractions or increased work of breathing.    SKIN: Visible skin clear. No significant rash, abnormal pigmentation or lesions.  NEURO: Cranial nerves grossly intact.  Mentation and speech appropriate for age.  PSYCH: Mentation appears normal, affect normal/bright, judgement and insight intact, normal speech and appearance well-groomed.          Assessment & Plan     Mixed stress and urge urinary incontinence  Switch to ditropan. Ok to message me to switch to #90 x 1 yr refills if this is working well or to message for dose increase if not working well.  - oxybutynin ER (DITROPAN-XL) 5 MG 24 hr tablet; Take 1 tablet (5 mg) by mouth daily    Chronic low back pain without sciatica, unspecified back pain laterality  Well controlled. Refilled medication.   Last filled 8/12/20 for #30. Not using frequently.  - HYDROcodone-acetaminophen (NORCO)  MG per tablet; Take 0.5-1 tablets by mouth every evening as needed for pain Take 1/2 - 1 tablet at night as needed for back pain.     BMI:   Estimated body mass index is 36.15 kg/m  as calculated from the following:    Height as of 2/19/20: 1.676 m (5' 6\").    Weight as of 6/8/20: 101.6 kg (224 lb).            No follow-ups on file.    Marybel Aguiar MD  [facility]      Video-Visit Details    Type of service:  Video Visit    Video End Time:10:31 AM    Originating Location (pt. Location): Home    Distant Location (provider location):  [facility]     Platform used for Video Visit: [employer]        "

## 2020-11-12 ASSESSMENT — ANXIETY QUESTIONNAIRES: GAD7 TOTAL SCORE: 0

## 2021-01-15 ENCOUNTER — MYC MEDICAL ADVICE (OUTPATIENT)
Dept: FAMILY MEDICINE | Facility: CLINIC | Age: 61
End: 2021-01-15

## 2021-01-15 DIAGNOSIS — N39.46 MIXED STRESS AND URGE URINARY INCONTINENCE: ICD-10-CM

## 2021-01-15 NOTE — LETTER
1/20/2021       Joana Arredondo  [address] [place] [address]        RE: Joana Arredondo,      To Whom It May Concern:     This is to certify that you are being evaluated and treated for muscular low back pain. This appears to be soft tissue related, and it may be that massages will give relief. I think it is medically reasonable to use your health care savings account for therapeutic massage as needed.    Sincerely,        Marybel Aguiar MD

## 2021-01-19 NOTE — TELEPHONE ENCOUNTER
Patient had a virtual visit on 11/11/20 regarding stress incontinence.  Patient is requesting an increase in oxybutynin. Currently she is on 5 mg. If she is not able to get an increase, she would like to go back on previous medication of tolterodine 2 mg. She is wondering if an increase in this would be possible. Oxybutynin 5 mg dose is pended. Will need to be changed if ok for increase.  Routed to provider.  Nancy Castillo RN

## 2021-01-19 NOTE — TELEPHONE ENCOUNTER
Patient is also requesting a new letter for massage for 2021 for her back and FSA.  Routed to provider.  Nancy Castillo RN

## 2021-01-20 RX ORDER — OXYBUTYNIN CHLORIDE 10 MG/1
10 TABLET, EXTENDED RELEASE ORAL DAILY
Qty: 90 TABLET | Refills: 4 | Status: SHIPPED | OUTPATIENT
Start: 2021-01-20 | End: 2022-04-28

## 2021-01-20 NOTE — TELEPHONE ENCOUNTER
GILMAR to check with Pharmacy on new med order and that letter was placed in [facility] [employer]

## 2021-01-20 NOTE — TELEPHONE ENCOUNTER
Letter placed in My Chart - Signed copy @ [facility] desk if pt wants it mailed or faxed or  .      RN to give message below.

## 2021-01-20 NOTE — TELEPHONE ENCOUNTER
Increase oxybutynin to 10mg daily. Prescription faxed to pharmacy.  Could increase further if needed and otherwise well tolerated.   Letter updated and printed.

## 2021-03-19 ENCOUNTER — IMMUNIZATION (OUTPATIENT)
Dept: FAMILY MEDICINE | Facility: CLINIC | Age: 61
End: 2021-03-19
Payer: COMMERCIAL

## 2021-03-19 PROCEDURE — 91301 PR COVID VAC MODERNA 100 MCG/0.5 ML IM: CPT

## 2021-03-19 PROCEDURE — 0011A PR COVID VAC MODERNA 100 MCG/0.5 ML IM: CPT

## 2021-04-16 ENCOUNTER — IMMUNIZATION (OUTPATIENT)
Dept: FAMILY MEDICINE | Facility: CLINIC | Age: 61
End: 2021-04-16
Attending: FAMILY MEDICINE
Payer: COMMERCIAL

## 2021-04-16 PROCEDURE — 0012A PR COVID VAC MODERNA 100 MCG/0.5 ML IM: CPT

## 2021-04-16 PROCEDURE — 91301 PR COVID VAC MODERNA 100 MCG/0.5 ML IM: CPT

## 2021-04-23 ENCOUNTER — OFFICE VISIT (OUTPATIENT)
Dept: FAMILY MEDICINE | Facility: CLINIC | Age: 61
End: 2021-04-23
Payer: COMMERCIAL

## 2021-04-23 VITALS
WEIGHT: 239 LBS | SYSTOLIC BLOOD PRESSURE: 118 MMHG | BODY MASS INDEX: 38.41 KG/M2 | TEMPERATURE: 98.9 F | HEIGHT: 66 IN | RESPIRATION RATE: 16 BRPM | OXYGEN SATURATION: 95 % | HEART RATE: 92 BPM | DIASTOLIC BLOOD PRESSURE: 80 MMHG

## 2021-04-23 DIAGNOSIS — Z13.220 LIPID SCREENING: ICD-10-CM

## 2021-04-23 DIAGNOSIS — E66.01 MORBID OBESITY (H): ICD-10-CM

## 2021-04-23 DIAGNOSIS — R87.810 CERVICAL HIGH RISK HPV (HUMAN PAPILLOMAVIRUS) TEST POSITIVE: ICD-10-CM

## 2021-04-23 DIAGNOSIS — Z79.899 ENCOUNTER FOR LONG-TERM CURRENT USE OF MEDICATION: ICD-10-CM

## 2021-04-23 DIAGNOSIS — Z13.0 SCREENING FOR ENDOCRINE, METABOLIC AND IMMUNITY DISORDER: ICD-10-CM

## 2021-04-23 DIAGNOSIS — G89.29 CHRONIC LOW BACK PAIN WITHOUT SCIATICA, UNSPECIFIED BACK PAIN LATERALITY: ICD-10-CM

## 2021-04-23 DIAGNOSIS — Z13.228 SCREENING FOR ENDOCRINE, METABOLIC AND IMMUNITY DISORDER: ICD-10-CM

## 2021-04-23 DIAGNOSIS — F41.9 ANXIETY: ICD-10-CM

## 2021-04-23 DIAGNOSIS — F32.5 MAJOR DEPRESSION IN COMPLETE REMISSION (H): ICD-10-CM

## 2021-04-23 DIAGNOSIS — Z13.29 SCREENING FOR ENDOCRINE, METABOLIC AND IMMUNITY DISORDER: ICD-10-CM

## 2021-04-23 DIAGNOSIS — Z00.00 WELL ADULT EXAM: Primary | ICD-10-CM

## 2021-04-23 DIAGNOSIS — L57.0 AK (ACTINIC KERATOSIS): ICD-10-CM

## 2021-04-23 DIAGNOSIS — M54.50 CHRONIC LOW BACK PAIN WITHOUT SCIATICA, UNSPECIFIED BACK PAIN LATERALITY: ICD-10-CM

## 2021-04-23 LAB
ANION GAP SERPL CALCULATED.3IONS-SCNC: 5 MMOL/L (ref 3–14)
BUN SERPL-MCNC: 20 MG/DL (ref 7–30)
CALCIUM SERPL-MCNC: 9.6 MG/DL (ref 8.5–10.1)
CHLORIDE SERPL-SCNC: 102 MMOL/L (ref 94–109)
CHOLEST SERPL-MCNC: 260 MG/DL
CO2 SERPL-SCNC: 29 MMOL/L (ref 20–32)
CREAT SERPL-MCNC: 0.83 MG/DL (ref 0.52–1.04)
GFR SERPL CREATININE-BSD FRML MDRD: 77 ML/MIN/{1.73_M2}
GLUCOSE SERPL-MCNC: 91 MG/DL (ref 70–99)
HDLC SERPL-MCNC: 57 MG/DL
LDLC SERPL CALC-MCNC: 159 MG/DL
NONHDLC SERPL-MCNC: 203 MG/DL
POTASSIUM SERPL-SCNC: 4 MMOL/L (ref 3.4–5.3)
SODIUM SERPL-SCNC: 136 MMOL/L (ref 133–144)
TRIGL SERPL-MCNC: 222 MG/DL
TSH SERPL DL<=0.005 MIU/L-ACNC: 1.37 MU/L (ref 0.4–4)

## 2021-04-23 PROCEDURE — 99396 PREV VISIT EST AGE 40-64: CPT | Mod: 25 | Performed by: FAMILY MEDICINE

## 2021-04-23 PROCEDURE — 36415 COLL VENOUS BLD VENIPUNCTURE: CPT | Performed by: FAMILY MEDICINE

## 2021-04-23 PROCEDURE — 17000 DESTRUCT PREMALG LESION: CPT | Performed by: FAMILY MEDICINE

## 2021-04-23 PROCEDURE — 87624 HPV HI-RISK TYP POOLED RSLT: CPT | Performed by: FAMILY MEDICINE

## 2021-04-23 PROCEDURE — 88175 CYTOPATH C/V AUTO FLUID REDO: CPT | Performed by: FAMILY MEDICINE

## 2021-04-23 PROCEDURE — 80048 BASIC METABOLIC PNL TOTAL CA: CPT | Performed by: FAMILY MEDICINE

## 2021-04-23 PROCEDURE — 84443 ASSAY THYROID STIM HORMONE: CPT | Performed by: FAMILY MEDICINE

## 2021-04-23 PROCEDURE — 80306 DRUG TEST PRSMV INSTRMNT: CPT | Performed by: FAMILY MEDICINE

## 2021-04-23 PROCEDURE — 80061 LIPID PANEL: CPT | Performed by: FAMILY MEDICINE

## 2021-04-23 RX ORDER — HYDROCODONE BITARTRATE AND ACETAMINOPHEN 10; 325 MG/1; MG/1
1 TABLET ORAL EVERY 6 HOURS PRN
Qty: 30 TABLET | Refills: 0 | Status: SHIPPED | OUTPATIENT
Start: 2021-04-23 | End: 2021-07-28

## 2021-04-23 RX ORDER — FLUOXETINE 40 MG/1
40 CAPSULE ORAL DAILY
Qty: 90 CAPSULE | Refills: 4 | Status: SHIPPED | OUTPATIENT
Start: 2021-04-23 | End: 2022-04-28

## 2021-04-23 ASSESSMENT — ENCOUNTER SYMPTOMS
HEMATURIA: 0
COUGH: 0
DYSURIA: 0
CONSTIPATION: 0
FREQUENCY: 0
NAUSEA: 0
HEADACHES: 0
SHORTNESS OF BREATH: 0
BREAST MASS: 0
ABDOMINAL PAIN: 0
FEVER: 0
DIARRHEA: 0
JOINT SWELLING: 0
NERVOUS/ANXIOUS: 0
HEARTBURN: 0
DIZZINESS: 0
HEMATOCHEZIA: 0
MYALGIAS: 0
WEAKNESS: 0
ARTHRALGIAS: 1
PALPITATIONS: 0
SORE THROAT: 0
EYE PAIN: 0
CHILLS: 0
PARESTHESIAS: 0

## 2021-04-23 ASSESSMENT — PATIENT HEALTH QUESTIONNAIRE - PHQ9: SUM OF ALL RESPONSES TO PHQ QUESTIONS 1-9: 1

## 2021-04-23 ASSESSMENT — MIFFLIN-ST. JEOR: SCORE: 1675.85

## 2021-04-23 NOTE — NURSING NOTE
"Initial /80   Pulse 92   Temp 98.9  F (37.2  C) (Tympanic)   Resp 16   Ht 1.676 m (5' 6\")   Wt 108.4 kg (239 lb)   LMP 03/06/2011   SpO2 95%   BMI 38.58 kg/m   Estimated body mass index is 38.58 kg/m  as calculated from the following:    Height as of this encounter: 1.676 m (5' 6\").    Weight as of this encounter: 108.4 kg (239 lb). .      "

## 2021-04-23 NOTE — PATIENT INSTRUCTIONS
Your BMI is Body mass index is Body mass index is 38.58 kg/m .     A BMI of 18.5 to 24.9 is in the healthy range. A person with a BMI of 25 to 29.9 is considered overweight, and someone with a BMI of 30 or greater is considered obese. More than two-thirds of [place] adults are considered overweight or obese.  Weight management is a personal decision.  If you are interested in exploring weight loss strategies, the following discussion covers the approaches that may be successful. Body mass index (BMI) is one way to tell whether you are at a healthy weight, overweight, or obese. It measures your weight in relation to your height.  Being overweight or obese increases the risk for further weight gain. Excess weight may lead to heart disease and diabetes.  Creating and following plans for healthy eating and physical activity may help you improve your health.  Weight control is part of healthy lifestyle and includes exercise, emotional health, and healthy eating habits. Careful eating habits lifelong are the mainstay of weight control. Though there are significant health benefits from weight loss, long-term weight loss with diet alone may be very difficult to achieve- studies show long-term success with dietary management alone in less than 10% of people. Attaining a healthy weight may be especially difficult to achieve in those with severe obesity. In some cases, medications, devices and surgical management might be considered.  What can you do?    Keep a food journal to help with mindful eating and finding ways to modify your diet.      Reduce the amount of processed food in your diet. Focus on adding vegetables, and lean proteins.    Reduce dietary carbohydrates. Limiting to  gm of carbohydrates per day has been shows to help boost weight loss.  If you have diabetes or are on diabetic medications do not do this without talking to your physician or healthcare provider.    Diet combined with exercise helps  maintain muscle while optimizing fat loss. Strength training is particularly important for building and maintaining muscle mass. Exercise helps reduce stress, increase energy, and improves fitness. Increasing exercise without diet control, however, may not burn enough calories to loose weight.         Start walking three days a week 10-20 minutes at a time    Work towards walking thirty minutes five days a week      Eventually, increase the speed of your walking for 1-2 minutes at time    In addition, we recommend that you review healthy lifestyles and methods for weight loss available through the National Institutes of Health patient information sites:  http://win.niddk.nih.gov/publications/index.htm    Also look into health and wellness programs that may be available through your health insurance provider, employer, local community center, or terri club.

## 2021-04-23 NOTE — LETTER
Opioid / Opioid Plus Controlled Substance Agreement    This is an agreement between you and your provider about the safe and appropriate use of controlled substance/opioids prescribed by your care team. Controlled substances are medicines that can cause physical and mental dependence (abuse).    There are strict laws about having and using these medicines. We here at [facility] are committing to working with you in your efforts to get better. To support you in this work, we ll help you schedule regular office appointments for medicine refills. If we must cancel or change your appointment for any reason, we ll make sure you have enough medicine to last until your next appointment.     As a Provider, I will:    Listen carefully to your concerns and treat you with respect.     Recommend a treatment plan that I believe is in your best interest. This plan may involve therapies other than opioid pain medication.     Talk with you often about the possible benefits, and the risk of harm of any medicine that we prescribe for you.     Provide a plan on how to taper (discontinue or go off) using this medicine if the decision is made to stop its use.    As a Patient, I understand that opioid(s):     Are a controlled substance prescribed by my care team to help me function or work and manage my condition(s).     Are strong medicines and can cause serious side effects such as:    Drowsiness, which can seriously affect my driving ability    A lower breathing rate, enough to cause death    Harm to my thinking ability     Depression     Abuse of and addiction to this medicine    Need to be taken exactly as prescribed. Combining opioids with certain medicines or chemicals (such as illegal drugs, sedatives, sleeping pills, and benzodiazepines) can be dangerous or even fatal. If I stop opioids suddenly, I may have severe withdrawal symptoms.    Do not work for all types of pain nor for all patients. If they re not helpful, I may  be asked to stop them.        The risks, benefits and side effects of these medicine(s) were explained to me. I agree that:  1. I will take part in other treatments as advised by my care team. This may be psychiatry or counseling, physical therapy, behavioral therapy, group treatment or a referral to a specialist.     2. I will keep all my appointments. I understand that this is part of the monitoring of opioids. My care team may require an office visit for EVERY opioid/controlled substance refill. If I miss appointments or don t follow instructions, my care team may stop my medicine.    3. I will take my medicines as prescribed. I will not change the dose or schedule unless my care team tells me to. There will be no refills if I run out early.     4. I may be asked to come to the clinic and complete a urine drug test or complete a pill count at any time. If I don t give a urine sample or participate in a pill count, the care team may stop my medicine.    5. I will only receive prescriptions from this clinic for chronic pain. If I am treated by another provider for acute pain issues, I will tell them that I am taking opioid pain medication for chronic pain and that I have a treatment agreement with this provider. I will inform my [facility] care team within one business day if I am given a prescription for any pain medication by another healthcare provider. My [facility] care team can contact other providers and pharmacists about my use of any medicines.    6. It is up to me to make sure that I don t run out of my medicines on weekends or holidays. If my care team is willing to refill my opioid prescription without a visit, I must request refills only during office hours. Refills may take up to 3 business days to process. I will use one pharmacy to fill all my opioid and other controlled substance prescriptions. I will notify the clinic about any changes to my insurance or medication  availability.    7. I am responsible for my prescriptions. If the medicine/prescription is lost, stolen or destroyed, it will not be replaced. I also agree not to share controlled substance medicines with anyone.    8. I am aware I should not use any illegal or recreational drugs. I agree not to drink alcohol unless my care team says I can.       9. If I enroll in the [place] [facility] program, I will tell my care team prior to my next refill.     10. I will tell my care team right away if I become pregnant, have a new medical problem treated outside of my regular clinic, or have a change in my medications.    11. I understand that this medicine can affect my thinking, judgment and reaction time. Alcohol and drugs affect the brain and body, which can affect the safety of my driving. Being under the influence of alcohol or drugs can affect my decision-making, behaviors, personal safety, and the safety of others. Driving while impaired (DWI) can occur if a person is driving, operating, or in physical control of a car, motorcycle, boat, snowmobile, ATV, motorbike, off-road vehicle, or any other motor vehicle ([place] Statute 169A.20). I understand the risk if I choose to drive or operate any vehicle or machinery.    I understand that if I do not follow any of the conditions above, my prescriptions or treatment may be stopped or changed.          Opioids  What You Need to Know    What are opioids?   Opioids are pain medicines that must be prescribed by a doctor. They are also known as narcotics.     Examples are:   1. morphine (MS Contin, Opal)  2. oxycodone (Oxycontin)  3. oxycodone and acetaminophen (Percocet)  4. hydrocodone and acetaminophen (Vicodin, Norco)   5. fentanyl patch (Duragesic)   6. hydromorphone (Dilaudid)   7. methadone  8. codeine (Tylenol #3)     What do opioids do well?   Opioids are best for severe short-term pain such as after a surgery or injury. They may work well for cancer pain. They may  help some people with long-lasting (chronic) pain.     What do opioids NOT do well?   Opioids never get rid of pain entirely, and they don t work well for most patients with chronic pain. Opioids don t reduce swelling, one of the causes of pain.                                    Other ways to manage chronic pain and improve function include:       Treat the health problem that may be causing pain    Anti-inflammation medicines, which reduce swelling and tenderness, such as ibuprofen (Advil, Motrin) or naproxen (Aleve)    Acetaminophen (Tylenol)    Antidepressants and anti-seizure medicines, especially for nerve pain    Topical treatments such as patches or creams    Injections or nerve blocks    Chiropractic or osteopathic treatment    Acupuncture, massage, deep breathing, meditation, visual imagery, aromatherapy    Use heat or ice at the pain site    Physical therapy     Exercise    Stop smoking    Take part in therapy       Risks and side effects     Talk to your doctor before you start or decide to keep taking opioids. Possible side effects include:      Lowering your breathing rate enough to cause death    Overdose, including death, especially if taking higher than prescribed doses    Worse depression symptoms; less pleasure in things you usually enjoy    Feeling tired or sluggish    Slower thoughts or cloudy thinking    Being more sensitive to pain over time; pain is harder to control    Trouble sleeping or restless sleep    Changes in hormone levels (for example, less testosterone)    Changes in sex drive or ability to have sex    Constipation    Unsafe driving    Itching and sweating    Dizziness    Nausea, throwing up and dry mouth    What else should I know about opioids?    Opioids may lead to dependence, tolerance, or addiction.      Dependence means that if you stop or reduce the medicine too quickly, you will have withdrawal symptoms. These include loose poop (diarrhea), jitters, flu-like symptoms,  nervousness and tremors. Dependence is not the same as addiction.                       Tolerance means needing higher doses over time to get the same effect. This may increase the chance of serious side effects.      Addiction is when people improperly use a substance that harms their body, their mind or their relations with others. Use of opiates can cause a relapse of addiction if you have a history of drug or alcohol abuse.      People who have used opioids for a long time may have a lower quality of life, worse depression, higher levels of pain and more visits to doctors.    You can overdose on opioids. Take these steps to lower your risk of overdose:    1. Recognize the signs:  Signs of overdose include decrease or loss of consciousness (blackout), slowed breathing, trouble waking up and blue lips. If someone is worried about overdose, they should call 911.    2. Talk to your doctor about Narcan (naloxone).   If you are at risk for overdose, you may be given a prescription for Narcan. This medicine very quickly reverses the effects of opioids.   If you overdose, a friend or family member can give you Narcan while waiting for the ambulance. They need to know the signs of overdose and how to give Narcan.     3. Don't use alcohol or street drugs.   Taking them with opioids can cause death.    4. Do not take any of these medicines unless your doctor says it s OK. Taking these with opioids can cause death:    Benzodiazepines, such as lorazepam (Ativan), alprazolam (Xanax) or diazepam (Valium)    Muscle relaxers, such as cyclobenzaprine (Flexeril)    Sleeping pills like zolpidem (Ambien)     Other opioids      How to keep you and other people safe while taking opioids:    1. Never share your opioids with others.  Opioid medicines are regulated by the Drug Enforcement Agency (JAMIE). Selling or sharing medications is a criminal act.    2. Be sure to store opioids in a secure place, locked up if possible. Young children  can easily swallow them and overdose.    3. When you are traveling with your medicines, keep them in the original bottles. If you use a pill box, be sure you also carry a copy of your medicine list from your clinic or pharmacy.    4. Safe disposal of opioids    Most pharmacies have places to get rid of medicine, called disposal kiosks. Medicine disposal options are also available in every [facility]. Search your county and  medication disposal  to find more options. You can find more details at:  https://www.[facility].[place]./living-green/managing-unwanted-medications     I agree that my provider, clinic care team, and pharmacy may work with any city, state or federal law enforcement agency that investigates the misuse, sale, or other diversion of my controlled medicine. I will allow my provider to discuss my care with, or share a copy of, this agreement with any other treating provider, pharmacy or emergency room where I receive care.    I have read this agreement and have asked questions about anything I did not understand.    _______________________________________________________  Patient Signature - Joana Arredondo _____________________                   Date     _______________________________________________________  Provider Signature - Marybel Aguiar MD   _____________________                   Date     _______________________________________________________  Witness Signature (required if provider not present while patient signing)   _____________________                   Date

## 2021-04-23 NOTE — PROGRESS NOTES
SUBJECTIVE:   CC: Joana Arredondo is an 60 year old woman who presents for preventive health visit.       Patient has been advised of split billing requirements and indicates understanding: Yes  Healthy Habits:     Getting at least 3 servings of Calcium per day:  Yes    Bi-annual eye exam:  Yes    Dental care twice a year:  Yes    Sleep apnea or symptoms of sleep apnea:  None    Diet:  Regular (no restrictions)    Frequency of exercise:  None    Taking medications regularly:  Yes    Medication side effects:  None    PHQ-2 Total Score: 0    Additional concerns today:  Yes      Joint Pain    Onset: 1 year    Description:   Location: right wrist and left thumb  Character: Dull ache in wrist,  Thumb has throbbing pain    Intensity: mild, moderate    Progression of Symptoms: worse    Accompanying Signs & Symptoms:  Other symptoms: numbness in right fingers    History:   Previous similar pain: no       Precipitating factors:   Trauma or overuse: no     Alleviating factors:  Improved by: nothing         Concern - Skin concerns  Onset: 2 years    Description:   Spot on left back shoulder- inreased in size and changed in texture  Mole located in center of chest     Intensity: mild    Progression of Symptoms:  same    Accompanying Signs & Symptoms:easily aggitated      Previous history of similar problem:   Patient has Hx of abnormal skin cells - BCC          Today's PHQ-2 Score:   PHQ-2 ( 1999 Pfizer) 2/16/2020   Q1: Little interest or pleasure in doing things 1   Q2: Feeling down, depressed or hopeless 1   PHQ-2 Score 2   Q1: Little interest or pleasure in doing things Several days   Q2: Feeling down, depressed or hopeless Several days   PHQ-2 Score 2       Abuse: Current or Past (Physical, Sexual or Emotional) - No  Do you feel safe in your environment? Yes        Social History     Tobacco Use     Smoking status: Former Smoker     Packs/day: 0.50     Years: 20.00     Pack years: 10.00     Types: Cigarettes     Quit  date: 2016     Years since quittin.3     Smokeless tobacco: Never Used     Tobacco comment: using E-cig   Substance Use Topics     Alcohol use: No         Alcohol Use 2020   Prescreen: >3 drinks/day or >7 drinks/week? No   Prescreen: >3 drinks/day or >7 drinks/week? -       Reviewed orders with patient.  Reviewed health maintenance and updated orders accordingly - Yes  Labs reviewed in EPIC  Patient Active Problem List   Diagnosis     Chronic low back pain     NICOLETTE (obstructive sleep apnea)-Mild (AHI 6)     CARDIOVASCULAR SCREENING; LDL GOAL LESS THAN 130     Major depression in complete remission (H)     BMI 38.0-38.9,adult     Anxiety     Mixed stress and urge urinary incontinence     Controlled substance agreement signed     Advanced directives, counseling/discussion     Morbid obesity (H)     Cervical high risk HPV (human papillomavirus) test positive     Past Surgical History:   Procedure Laterality Date     COLONOSCOPY  10/22/2012    Procedure: COLONOSCOPY;  Colonoscopy;  Surgeon: Robert Encinas MD;  Location: [facility]     CONIZATION LEE       ENT SURGERY       LAPAROSCOPIC CHOLECYSTECTOMY  10/21/2010    LAPAROSCOPIC CHOLECYSTECTOMY performed by DAMIR WING at [facility]     SURGICAL HISTORY OF -       Rt wrist surgery     SURGICAL HISTORY OF -       breast augmentation     SURGICAL HISTORY OF -   10/15/2010    EUS - GB bulding w/sludge, Nl bile duct w/no choledocholithiasis or sludge.      SURGICAL HISTORY OF -   2006    1.  Left shoulder arthroscopy with arthroscopic subacromial decompression.      TONSILLECTOMY & ADENOIDECTOMY  as child    tonsils and adenoids       Social History     Tobacco Use     Smoking status: Former Smoker     Packs/day: 0.50     Years: 20.00     Pack years: 10.00     Types: Cigarettes     Quit date: 2016     Years since quittin.3     Smokeless tobacco: Never Used     Tobacco comment: using E-cig   Substance Use Topics     Alcohol use: No      Family History   Problem Relation Age of Onset     Diabetes Father         type 2     C.A.D. Father      Heart Disease Father         had 2 heart attacks,68 for first one 79 on second     Cancer - colorectal Paternal Grandfather      Diabetes Sister         type 2         Current Outpatient Medications   Medication Sig Dispense Refill     acetaminophen (TYLENOL ARTHRITIS PAIN) 650 MG CR tablet Take 1,300 mg by mouth every 8 hours as needed        CALCIUM + D OR 1 TABLET DAILY       FLUoxetine 40 MG PO capsule Take 1 capsule (40 mg) by mouth daily 90 capsule 4     HYDROcodone-acetaminophen (NORCO)  MG per tablet TAKE ONE-HALF TO ONE TABLET BY MOUTH EVERY EVENING AS NEEDED FOR BACK PAIN 30 tablet 0     LORazepam (ATIVAN) 0.5 MG PO tablet Take 1-2 tablets (0.5-1 mg) by mouth every 8 hours as needed for anxiety 20 tablet 1     methocarbamol (ROBAXIN) 750 MG tablet Take 1-1.5 tablets (750-1,125 mg) by mouth 3 times daily as needed for muscle spasms 45 tablet 1     MULTIVITAMINS OR 1 daily        oxybutynin ER (DITROPAN-XL) 10 MG 24 hr tablet Take 1 tablet (10 mg) by mouth daily 90 tablet 4     acyclovir (ZOVIRAX) 5 % cream Apply  topically. Apply to affected area thinly five times daily at the first sign of symptoms. (Patient not taking: Reported on 11/11/2020) 5 g 2     fluocinonide-emollient (LIDEX-E) 0.05 % cream Apply to affected area of elbows twice daily as needed. (Patient not taking: Reported on 11/11/2020) 30 g 3     GLUCOSAMINE-CHONDROIT-CALCIUM PO Take 2 tablets by mouth daily       ibuprofen 200 MG capsule Take 200 mg by mouth every 4 hours as needed for fever       naproxen sodium (ALEVE) 220 MG capsule Take 220 mg by mouth 2 times daily as needed       tolterodine ER (DETROL LA) 2 MG 24 hr capsule Take 1 capsule (2 mg) by mouth daily (Patient not taking: Reported on 4/23/2021) 30 capsule 11     Allergies   Allergen Reactions     Nkda [No Known Drug Allergies]        Breast Cancer Screening:  Any new  diagnosis of family breast, ovarian, or bowel cancer?     FSH-7: No flowsheet data found.    Mammogram Screening: Recommended mammography every 1-2 years with patient discussion and risk factor consideration  Pertinent mammograms are reviewed under the imaging tab.    History of abnormal Pap smear:   YES - other categories - see link Cervical Cytology Screening Guidelines  Last 3 Pap and HPV Results:   PAP / HPV Latest Ref Rng & Units 2/19/2020 1/13/2014 6/25/2009   PAP - NIL NIL NIL   HPV 16 DNA NEG:Negative Negative - -   HPV 18 DNA NEG:Negative Positive(A) - -   OTHER HR HPV NEG:Negative Negative - -     PAP / HPV Latest Ref Rng & Units 2/19/2020 1/13/2014 6/25/2009   PAP - NIL NIL NIL   HPV 16 DNA NEG:Negative Negative - -   HPV 18 DNA NEG:Negative Positive(A) - -   OTHER HR HPV NEG:Negative Negative - -     Reviewed and updated as needed this visit by clinical staff  Tobacco  Allergies  Meds   Med Hx  Surg Hx  Fam Hx  Soc Hx        Reviewed and updated as needed this visit by Provider                Past Medical History:   Diagnosis Date     Cervical high risk HPV (human papillomavirus) test positive 02/18/2020    See problem list     Neoplasm of uncertain behavior of skin 3/13/2012     Tobacco use disorder 11/30/2005    Quit October 2007      Past Surgical History:   Procedure Laterality Date     COLONOSCOPY  10/22/2012    Procedure: COLONOSCOPY;  Colonoscopy;  Surgeon: Robert Encinas MD;  Location: [facility]     CONIZATION LEEP  1983     ENT SURGERY       LAPAROSCOPIC CHOLECYSTECTOMY  10/21/2010    LAPAROSCOPIC CHOLECYSTECTOMY performed by DAMIR WING at [facility]     SURGICAL HISTORY OF -   1973    Rt wrist surgery     SURGICAL HISTORY OF -   2004    breast augmentation     SURGICAL HISTORY OF -   10/15/2010    EUS - GB bulding w/sludge, Nl bile duct w/no choledocholithiasis or sludge.      SURGICAL HISTORY OF -   2/9/2006    1.  Left shoulder arthroscopy with arthroscopic subacromial decompression.  "     TONSILLECTOMY & ADENOIDECTOMY  as child    tonsils and adenoids       Review of Systems   Constitutional: Negative for chills and fever.   HENT: Positive for hearing loss. Negative for congestion, ear pain and sore throat.    Eyes: Negative for pain and visual disturbance.   Respiratory: Negative for cough and shortness of breath.    Cardiovascular: Negative for chest pain, palpitations and peripheral edema.   Gastrointestinal: Negative for abdominal pain, constipation, diarrhea, heartburn, hematochezia and nausea.   Breasts:  Negative for tenderness, breast mass and discharge.   Genitourinary: Positive for urgency. Negative for dysuria, frequency, genital sores, hematuria, pelvic pain, vaginal bleeding and vaginal discharge.   Musculoskeletal: Positive for arthralgias. Negative for joint swelling and myalgias.   Skin: Negative for rash.   Neurological: Negative for dizziness, weakness, headaches and paresthesias.   Psychiatric/Behavioral: Negative for mood changes. The patient is not nervous/anxious.           OBJECTIVE:   /80   Pulse 92   Temp 98.9  F (37.2  C) (Tympanic)   Resp 16   Ht 1.676 m (5' 6\")   Wt 108.4 kg (239 lb)   LMP 03/06/2011   SpO2 95%   BMI 38.58 kg/m    Physical Exam  GENERAL APPEARANCE: healthy, alert and no distress  EYES: Eyes grossly normal to inspection, PERRL and conjunctivae and sclerae normal  HENT: ear canals and TM's normal  NECK: no adenopathy, no asymmetry, masses, or scars and thyroid normal to palpation  RESP: lungs clear to auscultation - no rales, rhonchi or wheezes  BREAST: normal without masses, tenderness or nipple discharge and no palpable axillary masses or adenopathy  CV: regular rate and rhythm, normal S1 S2, no S3 or S4, no murmur, click or rub, no peripheral edema and peripheral pulses strong  ABDOMEN: soft, nontender, no hepatosplenomegaly, no masses and bowel sounds normal   (female): normal female external genitalia, normal urethral meatus, " vaginal mucosal atrophy noted, normal cervix, adnexae, and uterus without masses or abnormal discharge  MS: no musculoskeletal defects are noted and gait is age appropriate without ataxia  SKIN: no suspicious lesions or rashes and keratoses - actinic # 1 left posterior shoulder - frozen  4mm Papular dome shapes lesion mid chest  NEURO: Normal strength and tone, sensory exam grossly normal, mentation intact and speech normal  PSYCH: mentation appears normal and affect normal/bright    Diagnostic Test Results:  Labs reviewed in Epic    ASSESSMENT/PLAN:   1. Well adult exam    2. Major depression in complete remission (H)  Well controlled. Refilled medication.     - FLUoxetine (PROZAC) 40 MG capsule; Take 1 capsule (40 mg) by mouth daily  Dispense: 90 capsule; Refill: 4    3. Anxiety  Well controlled. Refilled medication.     - FLUoxetine (PROZAC) 40 MG capsule; Take 1 capsule (40 mg) by mouth daily  Dispense: 90 capsule; Refill: 4    4. AK (actinic keratosis)  Cryo as above. Lesion on her chest concerning for possible BCC - she has a history of of these. Advised follow-up with dermatology.  - DESTRUCT PREMALIGNANT LESION, FIRST    5. BMI 38  Working on lifestyle.     6. Chronic low back pain without sciatica, unspecified back pain laterality  Stable. Refilled medication.  #30 Norco Q3 months. Follow-up for re-check in 3 months for medication check. CSA signed and DWANA ordered.  - HYDROcodone-acetaminophen (NORCO)  MG per tablet; Take 1 tablet by mouth every 6 hours as needed for severe pain  Dispense: 30 tablet; Refill: 0    7. Encounter for long-term current use of medication  - [id number] - Urine Drugs of Abuse Panel 13 - (Screening)    8. Cervical high risk HPV (human papillomavirus) test positive  - HPV High Risk Types DNA Cervical  - Pap imaged thin layer diagnostic with HPV (select HPV order below)    9. Lipid screening  - Lipid panel reflex to direct LDL Fasting    10. Screening for endocrine, metabolic and  "immunity disorder  - Basic metabolic panel  - TSH with free T4 reflex    Patient has been advised of split billing requirements and indicates understanding: Yes  COUNSELING:  Reviewed preventive health counseling, as reflected in patient instructions    Estimated body mass index is 38.58 kg/m  as calculated from the following:    Height as of this encounter: 1.676 m (5' 6\").    Weight as of this encounter: 108.4 kg (239 lb).    Weight management plan: See AVS     She reports that she quit smoking about 5 years ago. Her smoking use included cigarettes. She has a 10.00 pack-year smoking history. She has never used smokeless tobacco.      Counseling Resources:  ATP IV Guidelines  Pooled Cohorts Equation Calculator  Breast Cancer Risk Calculator  BRCA-Related Cancer Risk Assessment: FHS-7 Tool  FRAX Risk Assessment  ICSI Preventive Guidelines  Dietary Guidelines for Americans, 2010  USDA's MyPlate  ASA Prophylaxis  Lung CA Screening    Marybel Aguiar MD  [facility]  "

## 2021-04-26 ENCOUNTER — OFFICE VISIT (OUTPATIENT)
Dept: DERMATOLOGY | Facility: CLINIC | Age: 61
End: 2021-04-26
Payer: COMMERCIAL

## 2021-04-26 VITALS — SYSTOLIC BLOOD PRESSURE: 102 MMHG | DIASTOLIC BLOOD PRESSURE: 52 MMHG | HEART RATE: 75 BPM | OXYGEN SATURATION: 97 %

## 2021-04-26 DIAGNOSIS — D18.01 ANGIOMA OF SKIN: ICD-10-CM

## 2021-04-26 DIAGNOSIS — L82.0 INFLAMED SEBORRHEIC KERATOSIS: ICD-10-CM

## 2021-04-26 DIAGNOSIS — L82.1 SEBORRHEIC KERATOSIS: ICD-10-CM

## 2021-04-26 DIAGNOSIS — L81.4 LENTIGO: Primary | ICD-10-CM

## 2021-04-26 DIAGNOSIS — Z85.828 HISTORY OF SKIN CANCER: ICD-10-CM

## 2021-04-26 DIAGNOSIS — D23.9 DERMAL NEVUS: ICD-10-CM

## 2021-04-26 LAB
AMPHETAMINES UR QL: NOT DETECTED NG/ML
BARBITURATES UR QL SCN: NOT DETECTED NG/ML
BENZODIAZ UR QL SCN: ABNORMAL NG/ML
BUPRENORPHINE UR QL: NOT DETECTED NG/ML
CANNABINOIDS UR QL: NOT DETECTED NG/ML
COCAINE UR QL SCN: NOT DETECTED NG/ML
D-METHAMPHET UR QL: NOT DETECTED NG/ML
METHADONE UR QL SCN: NOT DETECTED NG/ML
OPIATES UR QL SCN: NOT DETECTED NG/ML
OXYCODONE UR QL SCN: NOT DETECTED NG/ML
PCP UR QL SCN: NOT DETECTED NG/ML
PROPOXYPH UR QL: NOT DETECTED NG/ML
TRICYCLICS UR QL SCN: NOT DETECTED NG/ML

## 2021-04-26 PROCEDURE — 99213 OFFICE O/P EST LOW 20 MIN: CPT | Mod: 25 | Performed by: DERMATOLOGY

## 2021-04-26 PROCEDURE — 17110 DESTRUCTION B9 LES UP TO 14: CPT | Performed by: DERMATOLOGY

## 2021-04-26 NOTE — LETTER
4/26/2021         RE: Joana Arredondo  [id number] [place] [address]        Dear Colleague,    Thank you for referring your patient, Joana Arredondo, to the [facility]. Please see a copy of my visit note below.    Joana Arredondo is an extremely pleasant 60 year old year old female patient here today for spot on chest and nose.   .   Patient states this has been present for a while.  Patient reports the following symptoms:  Itching on chest.  Patient reports the following previous treatments none.  These treatments did not work.  Patient reports the following modifying factors none.  Associated symptoms: none.  Patient has no other skin complaints today.  Remainder of the HPI, Meds, PMH, Allergies, FH, and SH was reviewed in chart.      Past Medical History:   Diagnosis Date     Basal cell carcinoma      Cervical high risk HPV (human papillomavirus) test positive 02/18/2020    See problem list     Neoplasm of uncertain behavior of skin 3/13/2012     Tobacco use disorder 11/30/2005    Quit October 2007       Past Surgical History:   Procedure Laterality Date     COLONOSCOPY  10/22/2012    Procedure: COLONOSCOPY;  Colonoscopy;  Surgeon: Robert Encinas MD;  Location: [facility]     CONIZATION LEEP  1983     ENT SURGERY       LAPAROSCOPIC CHOLECYSTECTOMY  10/21/2010    LAPAROSCOPIC CHOLECYSTECTOMY performed by DAMIR WING at [facility]     SURGICAL HISTORY OF -   1973    Rt wrist surgery     SURGICAL HISTORY OF -   2004    breast augmentation     SURGICAL HISTORY OF -   10/15/2010    EUS - GB bulding w/sludge, Nl bile duct w/no choledocholithiasis or sludge.      SURGICAL HISTORY OF -   2/9/2006    1.  Left shoulder arthroscopy with arthroscopic subacromial decompression.      TONSILLECTOMY & ADENOIDECTOMY  as child    tonsils and adenoids        Family History   Problem Relation Age of Onset     Diabetes Father         type 2     C.A.D. Father      Heart Disease Father          had 2 heart attacks,68 for first one 79 on second     Cancer - colorectal Paternal Grandfather      Diabetes Sister         type 2       Social History     Socioeconomic History     Marital status:      Spouse name: Not on file     Number of children: Not on file     Years of education: Not on file     Highest education level: Not on file   Occupational History     Not on file   Social Needs     Financial resource strain: Not on file     Food insecurity     Worry: Not on file     Inability: Not on file     Transportation needs     Medical: Not on file     Non-medical: Not on file   Tobacco Use     Smoking status: Former Smoker     Packs/day: 0.50     Years: 20.00     Pack years: 10.00     Types: Cigarettes     Quit date: 2016     Years since quittin.3     Smokeless tobacco: Never Used     Tobacco comment: using E-cig   Substance and Sexual Activity     Alcohol use: No     Drug use: No     Sexual activity: Yes     Partners: Male     Comment: husbandvasectomy   Lifestyle     Physical activity     Days per week: Not on file     Minutes per session: Not on file     Stress: Not on file   Relationships     Social connections     Talks on phone: Not on file     Gets together: Not on file     Attends Oriental orthodox service: Not on file     Active member of club or organization: Not on file     Attends meetings of clubs or organizations: Not on file     Relationship status: Not on file     Intimate partner violence     Fear of current or ex partner: Not on file     Emotionally abused: Not on file     Physically abused: Not on file     Forced sexual activity: Not on file   Other Topics Concern     Parent/sibling w/ CABG, MI or angioplasty before 65F 55M? No   Social History Narrative     Not on file       Outpatient Encounter Medications as of 2021   Medication Sig Dispense Refill     acetaminophen (TYLENOL ARTHRITIS PAIN) 650 MG CR tablet Take 1,300 mg by mouth every 8 hours as needed        CALCIUM + D  OR 1 TABLET DAILY       FLUoxetine (PROZAC) 40 MG capsule Take 1 capsule (40 mg) by mouth daily 90 capsule 4     GLUCOSAMINE-CHONDROIT-CALCIUM PO Take 2 tablets by mouth daily       HYDROcodone-acetaminophen (NORCO)  MG per tablet Take 1 tablet by mouth every 6 hours as needed for severe pain 30 tablet 0     ibuprofen 200 MG capsule Take 200 mg by mouth every 4 hours as needed for fever       LORazepam (ATIVAN) 0.5 MG PO tablet Take 1-2 tablets (0.5-1 mg) by mouth every 8 hours as needed for anxiety 20 tablet 1     methocarbamol (ROBAXIN) 750 MG tablet Take 1-1.5 tablets (750-1,125 mg) by mouth 3 times daily as needed for muscle spasms 45 tablet 1     MULTIVITAMINS OR 1 daily        naproxen sodium (ALEVE) 220 MG capsule Take 220 mg by mouth 2 times daily as needed       oxybutynin ER (DITROPAN-XL) 10 MG 24 hr tablet Take 1 tablet (10 mg) by mouth daily 90 tablet 4     No facility-administered encounter medications on file as of 4/26/2021.              Review Of Systems  Skin: As above  Eyes: negative  Ears/Nose/Throat: negative  Respiratory: No shortness of breath, dyspnea on exertion, cough, or hemoptysis  Cardiovascular: negative  Gastrointestinal: negative  Genitourinary: negative  Musculoskeletal: negative  Neurologic: negative  Psychiatric: negative  Hematologic/Lymphatic/Immunologic: negative  Endocrine: negative      O:   NAD, WDWN, Alert & Oriented, Mood & Affect wnl, Vitals stable   Here today alone   /52 (BP Location: Left arm, Patient Position: Sitting, Cuff Size: Adult Large)   Pulse 75   LMP 03/06/2011   SpO2 97%    General appearance normal   Vitals stable   Alert, oriented and in no acute distress     L NSW well healed scar no evidence of recurrence   L chest inflamed seborrheic keratosis    Stuck on papules and brown macules on trunk and ext   Red papules on chest    Flesh colored papules neck      The remainder of expanded problem focused exam was normal; the following areas were  examined:  conjunctiva/lids, face, neck, lips, chest, digits/nails, RUE, LUE.      Eyes: Conjunctivae/lids:Normal     ENT: Lips, buccal mucosa, tongue: normal    MSK:Normal    Cardiovascular: peripheral edema none    Pulm: Breathing Normal    Neuro/Psych: Orientation:Alert and Orientedx3 ; Mood/Affect:normal       A/P:  1. Seborrheic keratosis, lentigo, angioma, dermal nevus, hx of non-melanoma skin cancer   2. Lc hest inflamed seborrheic keratosis   LN2:  Treated with LN2 for 5s for 1-2 cycles. Warned risks of blistering, pain, pigment change, scarring, and incomplete resolution.  Advised patient to return if lesions do not completely resolve.  Wound care sheet given.    It was a pleasure speaking to Joana Arredondo today.  Previous clinic  notes and pertinent laboratory tests were reviewed prior to Joana Arredondo's visit.  Signs and Symptoms of skin cancer discussed with patient.  Patient encouraged to perform monthly skin exams.  UV precautions reviewed with patient.  Return to clinic 6 months        Again, thank you for allowing me to participate in the care of your patient.        Sincerely,        Tato Dias MD

## 2021-04-26 NOTE — NURSING NOTE
"Initial /52 (BP Location: Left arm, Patient Position: Sitting, Cuff Size: Adult Large)   Pulse 75   LMP 03/06/2011   SpO2 97%  Estimated body mass index is 38.58 kg/m  as calculated from the following:    Height as of 4/23/21: 1.676 m (5' 6\").    Weight as of 4/23/21: 108.4 kg (239 lb). .        Initial BP 97/68 (BP Location: Left arm, Patient Position: Sitting, Cuff Size: Adult Large)   Pulse 75   LMP 03/06/2011   SpO2 97%  Estimated body mass index is 38.58 kg/m  as calculated from the following:    Height as of 4/23/21: 1.676 m (5' 6\").    Weight as of 4/23/21: 108.4 kg (239 lb). .      "

## 2021-04-26 NOTE — PROGRESS NOTES
Joana Arredondo is an extremely pleasant 60 year old year old female patient here today for spot on chest and nose.   .   Patient states this has been present for a while.  Patient reports the following symptoms:  Itching on chest.  Patient reports the following previous treatments none.  These treatments did not work.  Patient reports the following modifying factors none.  Associated symptoms: none.  Patient has no other skin complaints today.  Remainder of the HPI, Meds, PMH, Allergies, FH, and SH was reviewed in chart.      Past Medical History:   Diagnosis Date     Basal cell carcinoma      Cervical high risk HPV (human papillomavirus) test positive 02/18/2020    See problem list     Neoplasm of uncertain behavior of skin 3/13/2012     Tobacco use disorder 11/30/2005    Quit October 2007       Past Surgical History:   Procedure Laterality Date     COLONOSCOPY  10/22/2012    Procedure: COLONOSCOPY;  Colonoscopy;  Surgeon: Robert Encinas MD;  Location: [facility]     CONIZATION LEE  1983     ENT SURGERY       LAPAROSCOPIC CHOLECYSTECTOMY  10/21/2010    LAPAROSCOPIC CHOLECYSTECTOMY performed by DAMIR WING at [facility]     SURGICAL HISTORY OF -   1973    Rt wrist surgery     SURGICAL HISTORY OF -   2004    breast augmentation     SURGICAL HISTORY OF -   10/15/2010    EUS - GB bulding w/sludge, Nl bile duct w/no choledocholithiasis or sludge.      SURGICAL HISTORY OF -   2/9/2006    1.  Left shoulder arthroscopy with arthroscopic subacromial decompression.      TONSILLECTOMY & ADENOIDECTOMY  as child    tonsils and adenoids        Family History   Problem Relation Age of Onset     Diabetes Father         type 2     C.A.D. Father      Heart Disease Father         had 2 heart attacks,68 for first one 79 on second     Cancer - colorectal Paternal Grandfather      Diabetes Sister         type 2       Social History     Socioeconomic History     Marital status:      Spouse name: Not on file     Number of  children: Not on file     Years of education: Not on file     Highest education level: Not on file   Occupational History     Not on file   Social Needs     Financial resource strain: Not on file     Food insecurity     Worry: Not on file     Inability: Not on file     Transportation needs     Medical: Not on file     Non-medical: Not on file   Tobacco Use     Smoking status: Former Smoker     Packs/day: 0.50     Years: 20.00     Pack years: 10.00     Types: Cigarettes     Quit date: 2016     Years since quittin.3     Smokeless tobacco: Never Used     Tobacco comment: using E-cig   Substance and Sexual Activity     Alcohol use: No     Drug use: No     Sexual activity: Yes     Partners: Male     Comment: husbandvasectomy   Lifestyle     Physical activity     Days per week: Not on file     Minutes per session: Not on file     Stress: Not on file   Relationships     Social connections     Talks on phone: Not on file     Gets together: Not on file     Attends Yarsani service: Not on file     Active member of club or organization: Not on file     Attends meetings of clubs or organizations: Not on file     Relationship status: Not on file     Intimate partner violence     Fear of current or ex partner: Not on file     Emotionally abused: Not on file     Physically abused: Not on file     Forced sexual activity: Not on file   Other Topics Concern     Parent/sibling w/ CABG, MI or angioplasty before 65F 55M? No   Social History Narrative     Not on file       Outpatient Encounter Medications as of 2021   Medication Sig Dispense Refill     acetaminophen (TYLENOL ARTHRITIS PAIN) 650 MG CR tablet Take 1,300 mg by mouth every 8 hours as needed        CALCIUM + D OR 1 TABLET DAILY       FLUoxetine (PROZAC) 40 MG capsule Take 1 capsule (40 mg) by mouth daily 90 capsule 4     GLUCOSAMINE-CHONDROIT-CALCIUM PO Take 2 tablets by mouth daily       HYDROcodone-acetaminophen (NORCO)  MG per tablet Take 1 tablet by  mouth every 6 hours as needed for severe pain 30 tablet 0     ibuprofen 200 MG capsule Take 200 mg by mouth every 4 hours as needed for fever       LORazepam (ATIVAN) 0.5 MG PO tablet Take 1-2 tablets (0.5-1 mg) by mouth every 8 hours as needed for anxiety 20 tablet 1     methocarbamol (ROBAXIN) 750 MG tablet Take 1-1.5 tablets (750-1,125 mg) by mouth 3 times daily as needed for muscle spasms 45 tablet 1     MULTIVITAMINS OR 1 daily        naproxen sodium (ALEVE) 220 MG capsule Take 220 mg by mouth 2 times daily as needed       oxybutynin ER (DITROPAN-XL) 10 MG 24 hr tablet Take 1 tablet (10 mg) by mouth daily 90 tablet 4     No facility-administered encounter medications on file as of 4/26/2021.              Review Of Systems  Skin: As above  Eyes: negative  Ears/Nose/Throat: negative  Respiratory: No shortness of breath, dyspnea on exertion, cough, or hemoptysis  Cardiovascular: negative  Gastrointestinal: negative  Genitourinary: negative  Musculoskeletal: negative  Neurologic: negative  Psychiatric: negative  Hematologic/Lymphatic/Immunologic: negative  Endocrine: negative      O:   NAD, WDWN, Alert & Oriented, Mood & Affect wnl, Vitals stable   Here today alone   /52 (BP Location: Left arm, Patient Position: Sitting, Cuff Size: Adult Large)   Pulse 75   LMP 03/06/2011   SpO2 97%    General appearance normal   Vitals stable   Alert, oriented and in no acute distress     L NSW well healed scar no evidence of recurrence   L chest inflamed seborrheic keratosis    Stuck on papules and brown macules on trunk and ext   Red papules on chest    Flesh colored papules neck      The remainder of expanded problem focused exam was normal; the following areas were examined:  conjunctiva/lids, face, neck, lips, chest, digits/nails, RUE, LUE.      Eyes: Conjunctivae/lids:Normal     ENT: Lips, buccal mucosa, tongue: normal    MSK:Normal    Cardiovascular: peripheral edema none    Pulm: Breathing Normal    Neuro/Psych:  Orientation:Alert and Orientedx3 ; Mood/Affect:normal       A/P:  1. Seborrheic keratosis, lentigo, angioma, dermal nevus, hx of non-melanoma skin cancer   2. Lc hest inflamed seborrheic keratosis   LN2:  Treated with LN2 for 5s for 1-2 cycles. Warned risks of blistering, pain, pigment change, scarring, and incomplete resolution.  Advised patient to return if lesions do not completely resolve.  Wound care sheet given.    It was a pleasure speaking to Joana Arredondo today.  Previous clinic  notes and pertinent laboratory tests were reviewed prior to Joana Arredondo's visit.  Signs and Symptoms of skin cancer discussed with patient.  Patient encouraged to perform monthly skin exams.  UV precautions reviewed with patient.  Return to clinic 6 months

## 2021-04-28 LAB
COPATH REPORT: NORMAL
PAP: NORMAL

## 2021-04-30 ENCOUNTER — PATIENT OUTREACH (OUTPATIENT)
Dept: FAMILY MEDICINE | Facility: CLINIC | Age: 61
End: 2021-04-30

## 2021-04-30 ENCOUNTER — TELEPHONE (OUTPATIENT)
Dept: FAMILY MEDICINE | Facility: CLINIC | Age: 61
End: 2021-04-30

## 2021-04-30 NOTE — TELEPHONE ENCOUNTER
Hello Team!    Please call pt to set her up for a colposcopy at the [facility] with Dr Moser.    Thanks!    Nj Abarca, TONON, RN   Pap Tracking Nurse

## 2021-06-09 ENCOUNTER — OFFICE VISIT (OUTPATIENT)
Dept: OBGYN | Facility: CLINIC | Age: 61
End: 2021-06-09
Payer: COMMERCIAL

## 2021-06-09 VITALS
WEIGHT: 239.6 LBS | TEMPERATURE: 99.1 F | BODY MASS INDEX: 37.61 KG/M2 | SYSTOLIC BLOOD PRESSURE: 126 MMHG | HEART RATE: 79 BPM | DIASTOLIC BLOOD PRESSURE: 70 MMHG | OXYGEN SATURATION: 97 % | HEIGHT: 67 IN

## 2021-06-09 DIAGNOSIS — N88.2 STENOSIS OF CERVIX UTERI: ICD-10-CM

## 2021-06-09 DIAGNOSIS — R87.810 CERVICAL HIGH RISK HPV (HUMAN PAPILLOMAVIRUS) TEST POSITIVE: Primary | ICD-10-CM

## 2021-06-09 PROCEDURE — 88305 TISSUE EXAM BY PATHOLOGIST: CPT | Performed by: PATHOLOGY

## 2021-06-09 PROCEDURE — 57454 BX/CURETT OF CERVIX W/SCOPE: CPT | Performed by: OBSTETRICS & GYNECOLOGY

## 2021-06-09 ASSESSMENT — MIFFLIN-ST. JEOR: SCORE: 1694.45

## 2021-06-09 NOTE — PROGRESS NOTES
GYN: Colposcopy/LEEP    Date/Time: 6/9/2021 3:48 PM  Performed by: Johny Moser MD  Authorized by: Johny Moser MD     Consent:     Consent obtained:  Written    Consent given by:  Patient    Procedural risks discussed:  Bleeding, infection, possible continued pain and repeat procedure    Patient questions answered: yes      Patient agrees, verbalizes understanding, and wants to proceed: yes      Educational handouts given: no      Instructions and paperwork completed: yes    Pre-procedure:     Pre-procedure timeout performed: yes      Premeds:  Ibuprofen    Prepped with: acetic acid and Lugol      Local anesthetic:  Lidocaine 1% W/O epi  Indication:     Indication:  HPV    HPV Indications:  18  Procedure:     Procedure: Colposcopy w/ cervical biopsy and ECC      Under satisfactory analgesia the patient was prepped and draped in the dorsal lithotomy position: yes      [place] speculum was placed in the vagina: yes      Under colposcopic examination the transition zone was seen in entirety: yes      Intracervical block was performed: yes      Endocervix was curetted using a Kevorkian curette: yes      Cervical biopsy performed with a cervical biopsy punch: yes      Tampon inserted: no      Monsel's solution was applied: yes      Biopsy(s): yes      Location:  11:00, 6:00 and 2:00    Specimen to pathology: yes    Post-procedure:     Findings: Negative      Impression: Normal appearing cervix      Patient tolerance of procedure:  Patient tolerated the procedure well with no immediate complications  Comments:        High Risk HPV #18  Cervical high risk HPV (human papillomavirus) test positive  (primary encounter diagnosis)  Stenosis of cervix uteri  Normal appearing cervix       Unsatisfactory colposcopy  ROMANA    Johny Moser MD

## 2021-06-11 LAB — COPATH REPORT: NORMAL

## 2021-06-14 ENCOUNTER — PATIENT OUTREACH (OUTPATIENT)
Dept: OBGYN | Facility: CLINIC | Age: 61
End: 2021-06-14

## 2021-10-15 DIAGNOSIS — M54.50 CHRONIC LOW BACK PAIN WITHOUT SCIATICA, UNSPECIFIED BACK PAIN LATERALITY: ICD-10-CM

## 2021-10-15 DIAGNOSIS — G89.29 CHRONIC LOW BACK PAIN WITHOUT SCIATICA, UNSPECIFIED BACK PAIN LATERALITY: ICD-10-CM

## 2021-10-15 RX ORDER — METHOCARBAMOL 750 MG/1
TABLET, FILM COATED ORAL
Qty: 45 TABLET | Refills: 1 | Status: SHIPPED | OUTPATIENT
Start: 2021-10-15 | End: 2022-07-14

## 2021-10-15 NOTE — TELEPHONE ENCOUNTER
Routing refill request to provider for review/approval because:  Drug not on the [facility] refill protocol     Tessa Fulton RN

## 2021-10-23 ENCOUNTER — HEALTH MAINTENANCE LETTER (OUTPATIENT)
Age: 61
End: 2021-10-23

## 2022-02-02 ENCOUNTER — TELEPHONE (OUTPATIENT)
Dept: FAMILY MEDICINE | Facility: CLINIC | Age: 62
End: 2022-02-02
Payer: COMMERCIAL

## 2022-02-02 DIAGNOSIS — G89.29 CHRONIC LOW BACK PAIN WITHOUT SCIATICA, UNSPECIFIED BACK PAIN LATERALITY: ICD-10-CM

## 2022-02-02 DIAGNOSIS — M54.50 CHRONIC LOW BACK PAIN WITHOUT SCIATICA, UNSPECIFIED BACK PAIN LATERALITY: ICD-10-CM

## 2022-02-02 RX ORDER — HYDROCODONE BITARTRATE AND ACETAMINOPHEN 10; 325 MG/1; MG/1
.5-1 TABLET ORAL
Qty: 30 TABLET | Refills: 0 | Status: SHIPPED | OUTPATIENT
Start: 2022-02-02 | End: 2022-04-20

## 2022-02-02 NOTE — TELEPHONE ENCOUNTER
Routing refill request to provider for review/approval because:  Drug not on the [facility] refill protocol     Pending Prescriptions:                       Disp   Refills    HYDROcodone-acetaminophen (NORCO)  M*30 tab*0        Sig: TAKE ONE-HALF TO ONE TABLET BY MOUTH EVERY EVENING AS           NEEDED FOR BACK PAIN    Althea Jalloh, RN on 2/2/2022 at 1:55 PM

## 2022-02-02 NOTE — TELEPHONE ENCOUNTER
MARS and PHQ-9 need update. Assigned in My Chart. Please have complete ASAP prior to next refill.    Prescription faxed to pharmacy, please notify patient.      PDMP Review       Value Time User    State PDMP site checked  Yes 2/2/2022  5:03 PM Marybel Aguiar MD        ]

## 2022-02-07 NOTE — TELEPHONE ENCOUNTER
This writer attempted to contact Charlotte Montero on 02/07/22      Reason for call Providers message below and left detailed message.      If patient calls back:   Relay message below from provider, (read verbatim), document that pt called and close encounter        Giovanna Mahoney RN BSN

## 2022-02-14 ENCOUNTER — OFFICE VISIT (OUTPATIENT)
Dept: FAMILY MEDICINE | Facility: CLINIC | Age: 62
End: 2022-02-14
Payer: COMMERCIAL

## 2022-02-14 ENCOUNTER — ANCILLARY PROCEDURE (OUTPATIENT)
Dept: GENERAL RADIOLOGY | Facility: CLINIC | Age: 62
End: 2022-02-14
Attending: FAMILY MEDICINE
Payer: COMMERCIAL

## 2022-02-14 VITALS
TEMPERATURE: 97.7 F | DIASTOLIC BLOOD PRESSURE: 74 MMHG | BODY MASS INDEX: 37.53 KG/M2 | OXYGEN SATURATION: 98 % | HEIGHT: 67 IN | SYSTOLIC BLOOD PRESSURE: 116 MMHG | RESPIRATION RATE: 16 BRPM | HEART RATE: 79 BPM

## 2022-02-14 DIAGNOSIS — G56.01 CARPAL TUNNEL SYNDROME OF RIGHT WRIST: ICD-10-CM

## 2022-02-14 DIAGNOSIS — M25.531 RIGHT WRIST PAIN: ICD-10-CM

## 2022-02-14 DIAGNOSIS — M65.4 DE QUERVAIN'S DISEASE (TENOSYNOVITIS): Primary | ICD-10-CM

## 2022-02-14 PROCEDURE — 73110 X-RAY EXAM OF WRIST: CPT | Mod: RT | Performed by: RADIOLOGY

## 2022-02-14 PROCEDURE — 99213 OFFICE O/P EST LOW 20 MIN: CPT | Performed by: FAMILY MEDICINE

## 2022-02-14 ASSESSMENT — ANXIETY QUESTIONNAIRES
6. BECOMING EASILY ANNOYED OR IRRITABLE: NOT AT ALL
IF YOU CHECKED OFF ANY PROBLEMS ON THIS QUESTIONNAIRE, HOW DIFFICULT HAVE THESE PROBLEMS MADE IT FOR YOU TO DO YOUR WORK, TAKE CARE OF THINGS AT HOME, OR GET ALONG WITH OTHER PEOPLE: NOT DIFFICULT AT ALL
GAD7 TOTAL SCORE: 0
7. FEELING AFRAID AS IF SOMETHING AWFUL MIGHT HAPPEN: NOT AT ALL
1. FEELING NERVOUS, ANXIOUS, OR ON EDGE: NOT AT ALL
3. WORRYING TOO MUCH ABOUT DIFFERENT THINGS: NOT AT ALL
2. NOT BEING ABLE TO STOP OR CONTROL WORRYING: NOT AT ALL
5. BEING SO RESTLESS THAT IT IS HARD TO SIT STILL: NOT AT ALL

## 2022-02-14 ASSESSMENT — PATIENT HEALTH QUESTIONNAIRE - PHQ9
SUM OF ALL RESPONSES TO PHQ QUESTIONS 1-9: 0
5. POOR APPETITE OR OVEREATING: NOT AT ALL

## 2022-02-14 ASSESSMENT — PAIN SCALES - GENERAL: PAINLEVEL: EXTREME PAIN (8)

## 2022-02-14 NOTE — PROGRESS NOTES
"  Assessment & Plan     De Quervain's disease (tenosynovitis)   wrist splint with thumb spica given  Ice  NSAIDS regularly    Right wrist pain     - XR Wrist Right G/E 3 Views    Carpal tunnel syndrome of right wrist   patient will let me know if she would like a referral to orthopedics/Dr. West               BMI:   Estimated body mass index is 37.53 kg/m  as calculated from the following:    Height as of this encounter: 1.702 m (5' 7\").    Weight as of 6/9/21: 108.7 kg (239 lb 9.6 oz).           No follow-ups on file.    Miguelina Field MD  [facility]   Charlotte Montero is a 61 year old who presents for the following health issues     HPI   Joint Pain    Onset: 2 days ago picked up a heavy object with right hand    Description:   Location: right hand  Character: Sharp when she is using and Dull ache    Intensity: 8/10    Progression of Symptoms: better    Accompanying Signs & Symptoms:  Other symptoms: radiation of pain to arm, numbness and tingling    History:   Previous similar pain: YES      Precipitating factors:   Trauma or overuse: YES    Alleviating factors:  Improved by: nothing    Therapies Tried and outcome: ice, ibu, naproxen, brace nothing is of help.          Review of Systems   Constitutional, HEENT, cardiovascular, pulmonary, gi and gu systems are negative, except as otherwise noted.      Objective    /74   Pulse 79   Temp 97.7  F (36.5  C) (Tympanic)   Resp 16   Ht 1.702 m (5' 7\")   LMP 03/06/2011   SpO2 98%   Breastfeeding No   BMI 37.53 kg/m    Body mass index is 37.53 kg/m .  Physical Exam   GENERAL APPEARANCE: healthy, alert and no distress  ORTHO: Wrist Exam: WRIST:  Inspection: no swelling  no effusion  Palpation: Tender: extensor thumb tender  Non-tender: snuff box  Range of Motion: painful extension of thumb  Strength: no deficits                    "

## 2022-02-14 NOTE — PATIENT INSTRUCTIONS
Our Clinic hours are:  Mondays    7:20 am - 7 pm  Tues -  Fri  7:20 am - 5 pm    Clinic Phone: [phone]    The clinic lab opens at 7:30 am Mon - Fri and appointments are required.    [facility]. [phone]  Monday  8 am - 7pm  Tues - Fri 8 am - 5:30 pm

## 2022-02-15 ASSESSMENT — ANXIETY QUESTIONNAIRES: GAD7 TOTAL SCORE: 0

## 2022-03-04 ENCOUNTER — MYC MEDICAL ADVICE (OUTPATIENT)
Dept: FAMILY MEDICINE | Facility: CLINIC | Age: 62
End: 2022-03-04
Payer: COMMERCIAL

## 2022-04-04 ENCOUNTER — ANCILLARY PROCEDURE (OUTPATIENT)
Dept: MAMMOGRAPHY | Facility: CLINIC | Age: 62
End: 2022-04-04
Attending: FAMILY MEDICINE
Payer: COMMERCIAL

## 2022-04-04 DIAGNOSIS — Z12.31 VISIT FOR SCREENING MAMMOGRAM: ICD-10-CM

## 2022-04-04 PROCEDURE — 77067 SCR MAMMO BI INCL CAD: CPT | Mod: TC | Performed by: RADIOLOGY

## 2022-04-20 DIAGNOSIS — G89.29 CHRONIC LOW BACK PAIN WITHOUT SCIATICA, UNSPECIFIED BACK PAIN LATERALITY: ICD-10-CM

## 2022-04-20 DIAGNOSIS — M54.50 CHRONIC LOW BACK PAIN WITHOUT SCIATICA, UNSPECIFIED BACK PAIN LATERALITY: ICD-10-CM

## 2022-04-20 RX ORDER — HYDROCODONE BITARTRATE AND ACETAMINOPHEN 10; 325 MG/1; MG/1
TABLET ORAL
Qty: 30 TABLET | Refills: 0 | Status: SHIPPED | OUTPATIENT
Start: 2022-04-20 | End: 2022-07-14

## 2022-04-20 NOTE — TELEPHONE ENCOUNTER
Last seen 4/21.  Given that this is a controlled substance she will need an office visit for any further refills.  CSA, DAWNA, PHQ and MARS need updating.  I am able to refill this once to give her time to get in but after that no additional refills without an office visit.

## 2022-04-20 NOTE — TELEPHONE ENCOUNTER
Routing refill request to provider for review/approval because:  Drug not on the [facility] refill protocol     Pending Prescriptions:                       Disp   Refills    HYDROcodone-acetaminophen (NORCO)  M*30 tab*0        Sig: TAKE 1 TABLET BY MOUTH EVERY 6 HOURS AS NEEDED FOR           SEVERE PAIN    Althea Jalloh RN on 4/20/2022 at 1:01 PM

## 2022-04-21 ENCOUNTER — MYC MEDICAL ADVICE (OUTPATIENT)
Dept: FAMILY MEDICINE | Facility: CLINIC | Age: 62
End: 2022-04-21
Payer: COMMERCIAL

## 2022-04-21 NOTE — TELEPHONE ENCOUNTER
Left detailed on pt identified voicemail - Appt needed for any further refills.  Also sent My Chart message

## 2022-04-21 NOTE — TELEPHONE ENCOUNTER
I could work her in at 7:40 (7:20 arrival time) on 4/28.  She would not need to do labs ahead of time we could just do them at the time of the visit.

## 2022-04-28 ENCOUNTER — OFFICE VISIT (OUTPATIENT)
Dept: FAMILY MEDICINE | Facility: CLINIC | Age: 62
End: 2022-04-28
Payer: COMMERCIAL

## 2022-04-28 VITALS
WEIGHT: 239 LBS | SYSTOLIC BLOOD PRESSURE: 126 MMHG | HEIGHT: 67 IN | TEMPERATURE: 96.7 F | BODY MASS INDEX: 37.51 KG/M2 | HEART RATE: 77 BPM | DIASTOLIC BLOOD PRESSURE: 78 MMHG | OXYGEN SATURATION: 97 % | RESPIRATION RATE: 16 BRPM

## 2022-04-28 DIAGNOSIS — Z00.00 WELL ADULT EXAM: Primary | ICD-10-CM

## 2022-04-28 DIAGNOSIS — Z13.228 SCREENING FOR ENDOCRINE, METABOLIC AND IMMUNITY DISORDER: ICD-10-CM

## 2022-04-28 DIAGNOSIS — F41.9 ANXIETY: ICD-10-CM

## 2022-04-28 DIAGNOSIS — F11.20 UNCOMPLICATED OPIOID DEPENDENCE (H): ICD-10-CM

## 2022-04-28 DIAGNOSIS — Z13.29 SCREENING FOR ENDOCRINE, METABOLIC AND IMMUNITY DISORDER: ICD-10-CM

## 2022-04-28 DIAGNOSIS — Z13.0 SCREENING FOR ENDOCRINE, METABOLIC AND IMMUNITY DISORDER: ICD-10-CM

## 2022-04-28 DIAGNOSIS — R87.810 CERVICAL HIGH RISK HPV (HUMAN PAPILLOMAVIRUS) TEST POSITIVE: ICD-10-CM

## 2022-04-28 DIAGNOSIS — N39.46 MIXED STRESS AND URGE URINARY INCONTINENCE: ICD-10-CM

## 2022-04-28 DIAGNOSIS — F32.5 MAJOR DEPRESSION IN COMPLETE REMISSION (H): ICD-10-CM

## 2022-04-28 DIAGNOSIS — Z13.220 LIPID SCREENING: ICD-10-CM

## 2022-04-28 DIAGNOSIS — Z12.4 CERVICAL CANCER SCREENING: ICD-10-CM

## 2022-04-28 LAB
ANION GAP SERPL CALCULATED.3IONS-SCNC: 3 MMOL/L (ref 3–14)
BUN SERPL-MCNC: 20 MG/DL (ref 7–30)
CALCIUM SERPL-MCNC: 9.6 MG/DL (ref 8.5–10.1)
CHLORIDE BLD-SCNC: 105 MMOL/L (ref 94–109)
CHOLEST SERPL-MCNC: 232 MG/DL
CO2 SERPL-SCNC: 29 MMOL/L (ref 20–32)
CREAT SERPL-MCNC: 0.86 MG/DL (ref 0.52–1.04)
CREAT UR-MCNC: 327 MG/DL
FASTING STATUS PATIENT QL REPORTED: YES
GFR SERPL CREATININE-BSD FRML MDRD: 77 ML/MIN/1.73M2
GLUCOSE BLD-MCNC: 111 MG/DL (ref 70–99)
HDLC SERPL-MCNC: 55 MG/DL
LDLC SERPL CALC-MCNC: 127 MG/DL
NONHDLC SERPL-MCNC: 177 MG/DL
POTASSIUM BLD-SCNC: 4.1 MMOL/L (ref 3.4–5.3)
SODIUM SERPL-SCNC: 137 MMOL/L (ref 133–144)
TRIGL SERPL-MCNC: 250 MG/DL

## 2022-04-28 PROCEDURE — 80307 DRUG TEST PRSMV CHEM ANLYZR: CPT | Performed by: FAMILY MEDICINE

## 2022-04-28 PROCEDURE — 99396 PREV VISIT EST AGE 40-64: CPT | Mod: 25 | Performed by: FAMILY MEDICINE

## 2022-04-28 PROCEDURE — 36415 COLL VENOUS BLD VENIPUNCTURE: CPT | Performed by: FAMILY MEDICINE

## 2022-04-28 PROCEDURE — G0145 SCR C/V CYTO,THINLAYER,RESCR: HCPCS | Performed by: FAMILY MEDICINE

## 2022-04-28 PROCEDURE — 80048 BASIC METABOLIC PNL TOTAL CA: CPT | Performed by: FAMILY MEDICINE

## 2022-04-28 PROCEDURE — 99214 OFFICE O/P EST MOD 30 MIN: CPT | Mod: 25 | Performed by: FAMILY MEDICINE

## 2022-04-28 PROCEDURE — 87624 HPV HI-RISK TYP POOLED RSLT: CPT | Performed by: FAMILY MEDICINE

## 2022-04-28 PROCEDURE — 80061 LIPID PANEL: CPT | Performed by: FAMILY MEDICINE

## 2022-04-28 RX ORDER — OXYBUTYNIN CHLORIDE 10 MG/1
10 TABLET, EXTENDED RELEASE ORAL DAILY
Qty: 90 TABLET | Refills: 4 | Status: SHIPPED | OUTPATIENT
Start: 2022-04-28 | End: 2023-02-02

## 2022-04-28 RX ORDER — FLUOXETINE 40 MG/1
40 CAPSULE ORAL DAILY
Qty: 90 CAPSULE | Refills: 4 | Status: SHIPPED | OUTPATIENT
Start: 2022-04-28 | End: 2023-07-20

## 2022-04-28 ASSESSMENT — ENCOUNTER SYMPTOMS
HEMATOCHEZIA: 0
NAUSEA: 0
CONSTIPATION: 0
BREAST MASS: 0
DYSURIA: 0
PARESTHESIAS: 0
HEMATURIA: 0
DIZZINESS: 0
JOINT SWELLING: 0
PALPITATIONS: 0
NERVOUS/ANXIOUS: 0
SHORTNESS OF BREATH: 0
SORE THROAT: 0
FEVER: 0
FREQUENCY: 0
HEADACHES: 0
EYE PAIN: 0
COUGH: 0
MYALGIAS: 0
ABDOMINAL PAIN: 0
HEARTBURN: 0
WEAKNESS: 0
DIARRHEA: 0
CHILLS: 0
ARTHRALGIAS: 0

## 2022-04-28 ASSESSMENT — PAIN SCALES - GENERAL: PAINLEVEL: NO PAIN (0)

## 2022-04-28 NOTE — NURSING NOTE
"Initial /78   Pulse 77   Temp (!) 96.7  F (35.9  C) (Tympanic)   Resp 16   Ht 1.702 m (5' 7\")   Wt 108.4 kg (239 lb)   LMP 03/06/2011   SpO2 97%   BMI 37.43 kg/m   Estimated body mass index is 37.43 kg/m  as calculated from the following:    Height as of this encounter: 1.702 m (5' 7\").    Weight as of this encounter: 108.4 kg (239 lb). .      "

## 2022-04-28 NOTE — PROGRESS NOTES
SUBJECTIVE:   CC: Joana Arredondo is an 61 year old woman who presents for preventive health visit.       Patient has been advised of split billing requirements and indicates understanding: Yes  Healthy Habits:     Getting at least 3 servings of Calcium per day:  Yes    Bi-annual eye exam:  Yes    Dental care twice a year:  Yes    Sleep apnea or symptoms of sleep apnea:  Daytime drowsiness    Diet:  Regular (no restrictions)    Frequency of exercise:  2-3 days/week    Duration of exercise:  15-30 minutes    Taking medications regularly:  Yes    Medication side effects:  None    PHQ-2 Total Score: 0    Additional concerns today:  No              Today's PHQ-2 Score:   PHQ-2 (  [employer]) 2022   Q1: Little interest or pleasure in doing things 0   Q2: Feeling down, depressed or hopeless 0   PHQ-2 Score 0   PHQ-2 Total Score (12-17 Years)- Positive if 3 or more points; Administer PHQ-A if positive -   Q1: Little interest or pleasure in doing things Not at all   Q2: Feeling down, depressed or hopeless Not at all   PHQ-2 Score 0       Abuse: Current or Past (Physical, Sexual or Emotional) - No  Do you feel safe in your environment? Yes        Social History     Tobacco Use     Smoking status: Former Smoker     Packs/day: 0.50     Years: 20.00     Pack years: 10.00     Types: Cigarettes     Quit date: 2016     Years since quittin.3     Smokeless tobacco: Never Used     Tobacco comment: using E-cig   Substance Use Topics     Alcohol use: No         Alcohol Use 2022   Prescreen: >3 drinks/day or >7 drinks/week? No   Prescreen: >3 drinks/day or >7 drinks/week? -   No flowsheet data found.    Reviewed orders with patient.  Reviewed health maintenance and updated orders accordingly - Yes  Labs reviewed in EPIC  Patient Active Problem List   Diagnosis     Chronic low back pain     NICOLETTE (obstructive sleep apnea)-Mild (AHI 6)     CARDIOVASCULAR SCREENING; LDL GOAL LESS THAN 130     Major depression in  complete remission (H)     BMI 38.0-38.9,adult     Anxiety     Mixed stress and urge urinary incontinence     Controlled substance agreement signed     Advanced directives, counseling/discussion     Morbid obesity (H)     Cervical high risk HPV (human papillomavirus) test positive     Stenosis of cervix uteri     Major depression in complete remission (H)     Uncomplicated opioid dependence (H)     Past Surgical History:   Procedure Laterality Date     COLONOSCOPY  10/22/2012    Procedure: COLONOSCOPY;  Colonoscopy;  Surgeon: Robert Encinas MD;  Location: [facility]     CONIZATION LEEP       ENT SURGERY       LAPAROSCOPIC CHOLECYSTECTOMY  10/21/2010    LAPAROSCOPIC CHOLECYSTECTOMY performed by DAMIR WING at [facility]     SURGICAL HISTORY OF -       Rt wrist surgery     SURGICAL HISTORY OF -       breast augmentation     SURGICAL HISTORY OF -   10/15/2010    EUS - GB bulding w/sludge, Nl bile duct w/no choledocholithiasis or sludge.      SURGICAL HISTORY OF -   2006    1.  Left shoulder arthroscopy with arthroscopic subacromial decompression.      TONSILLECTOMY & ADENOIDECTOMY  as child    tonsils and adenoids       Social History     Tobacco Use     Smoking status: Former Smoker     Packs/day: 0.50     Years: 20.00     Pack years: 10.00     Types: Cigarettes     Quit date: 2016     Years since quittin.3     Smokeless tobacco: Never Used     Tobacco comment: using E-cig   Substance Use Topics     Alcohol use: No     Family History   Problem Relation Age of Onset     Diabetes Father         type 2     C.A.D. Father      Heart Disease Father         had 2 heart attacks,68 for first one 79 on second     Cancer - colorectal Paternal Grandfather      Diabetes Sister         type 2         Current Outpatient Medications   Medication Sig Dispense Refill     acetaminophen (TYLENOL) 650 MG CR tablet Take 1,300 mg by mouth every 8 hours as needed        CALCIUM + D OR 1 TABLET DAILY       FLUoxetine  (PROZAC) 40 MG capsule Take 1 capsule (40 mg) by mouth daily 90 capsule 4     HYDROcodone-acetaminophen (NORCO)  MG per tablet TAKE 1 TABLET BY MOUTH EVERY 6 HOURS AS NEEDED FOR SEVERE PAIN 30 tablet 0     ibuprofen 200 MG capsule Take 200 mg by mouth every 4 hours as needed for fever       LORazepam (ATIVAN) 0.5 MG tablet TAKE ONE TO TWO TABLETS (0.5-1MG) BY MOUTH EVERY 8 HOURS AS NEEDED FOR ANXIETY 20 tablet 1     methocarbamol (ROBAXIN) 750 MG tablet TAKE ONE TO ONE AND ONE-HALF TABLETS BY MOUTH THREE TIMES A DAY AS NEEDED FOR MUSCLE SPASMS 45 tablet 1     MULTIVITAMINS OR 1 daily        oxybutynin ER (DITROPAN-XL) 10 MG 24 hr tablet Take 1 tablet (10 mg) by mouth daily 90 tablet 4     Allergies   Allergen Reactions     Nkda [No Known Drug Allergies]        Breast Cancer Screening:    Breast CA Risk Assessment (FHS-7) 4/28/2022   Do you have a family history of breast, colon, or ovarian cancer? No / Unknown         Mammogram Screening: Recommended mammography every 1-2 years with patient discussion and risk factor consideration  Pertinent mammograms are reviewed under the imaging tab.    History of abnormal Pap smear: YES - updated in Problem List and Health Maintenance accordingly  PAP / HPV Latest Ref Rng & Units 4/23/2021 2/19/2020 1/13/2014   PAP (Historical) - NIL NIL NIL   HPV16 NEG:Negative Negative Negative -   HPV18 NEG:Negative Positive(A) Positive(A) -   HRHPV NEG:Negative Negative Negative -     Reviewed and updated as needed this visit by clinical staff   Tobacco  Allergies  Meds  Problems  Med Hx  Surg Hx  Fam Hx  Soc   Hx          Reviewed and updated as needed this visit by Provider   Tobacco  Allergies  Meds  Problems  Med Hx  Surg Hx  Fam Hx           Past Medical History:   Diagnosis Date     Basal cell carcinoma      Cervical high risk HPV (human papillomavirus) test positive 02/18/2020 4/23/21     Neoplasm of uncertain behavior of skin 03/13/2012     Tobacco use  "disorder 11/30/2005    Quit October 2007      Past Surgical History:   Procedure Laterality Date     COLONOSCOPY  10/22/2012    Procedure: COLONOSCOPY;  Colonoscopy;  Surgeon: Robert Encinas MD;  Location: [facility]     CONIZATION LEEP 1983     ENT SURGERY       LAPAROSCOPIC CHOLECYSTECTOMY  10/21/2010    LAPAROSCOPIC CHOLECYSTECTOMY performed by DAMIR WING at [facility]     SURGICAL HISTORY OF -   1973    Rt wrist surgery     SURGICAL HISTORY OF -   2004    breast augmentation     SURGICAL HISTORY OF -   10/15/2010    EUS - GB bulding w/sludge, Nl bile duct w/no choledocholithiasis or sludge.      SURGICAL HISTORY OF -   2/9/2006    1.  Left shoulder arthroscopy with arthroscopic subacromial decompression.      TONSILLECTOMY & ADENOIDECTOMY  as child    tonsils and adenoids       Review of Systems   Constitutional: Negative for chills and fever.   HENT: Negative for congestion, ear pain, hearing loss and sore throat.    Eyes: Negative for pain and visual disturbance.   Respiratory: Negative for cough and shortness of breath.    Cardiovascular: Negative for chest pain, palpitations and peripheral edema.   Gastrointestinal: Negative for abdominal pain, constipation, diarrhea, heartburn, hematochezia and nausea.   Breasts:  Negative for tenderness, breast mass and discharge.   Genitourinary: Negative for dysuria, frequency, genital sores, hematuria, pelvic pain, urgency, vaginal bleeding and vaginal discharge.   Musculoskeletal: Negative for arthralgias, joint swelling and myalgias.   Skin: Negative for rash.   Neurological: Negative for dizziness, weakness, headaches and paresthesias.   Psychiatric/Behavioral: Negative for mood changes. The patient is not nervous/anxious.           OBJECTIVE:   /78   Pulse 77   Temp (!) 96.7  F (35.9  C) (Tympanic)   Resp 16   Ht 1.702 m (5' 7\")   Wt 108.4 kg (239 lb)   LMP 03/06/2011   SpO2 97%   BMI 37.43 kg/m    Physical Exam  GENERAL APPEARANCE: healthy, alert " and no distress  EYES: Eyes grossly normal to inspection, PERRL and conjunctivae and sclerae normal  HENT: ear canals and TM's normal  NECK: no adenopathy, no asymmetry, masses, or scars and thyroid normal to palpation  RESP: lungs clear to auscultation - no rales, rhonchi or wheezes  BREAST: normal without masses, tenderness or nipple discharge and no palpable axillary masses or adenopathy  CV: regular rate and rhythm, normal S1 S2, no S3 or S4, no murmur, click or rub, no peripheral edema and peripheral pulses strong  ABDOMEN: soft, nontender, no hepatosplenomegaly, no masses and bowel sounds normal   (female): normal female external genitalia, normal urethral meatus, vaginal mucosal atrophy noted, normal cervix, adnexae, and uterus without masses or abnormal discharge  MS: no musculoskeletal defects are noted and gait is age appropriate without ataxia  SKIN: no suspicious lesions or rashes  NEURO: Normal strength and tone, sensory exam grossly normal, mentation intact and speech normal  PSYCH: mentation appears normal and affect normal/bright    Diagnostic Test Results:  Labs reviewed in Epic    ASSESSMENT/PLAN:   Well adult exam    Uncomplicated opioid dependence (H)  Is on hydrocodone #30/month for chronic low back pain.  DAWNA and CSA were due and updated today.  She recently got a refill of the hydrocodone so is not due for refill today.  - Drug Confirmation Panel Urine with Creatinine; Future  - Drug Confirmation Panel Urine with Creatinine    Major depression in complete remission (H)  Well controlled. Refilled medication.     - FLUoxetine (PROZAC) 40 MG capsule; Take 1 capsule (40 mg) by mouth daily    Anxiety  Well controlled. Refilled medication.     - FLUoxetine (PROZAC) 40 MG capsule; Take 1 capsule (40 mg) by mouth daily    Mixed stress and urge urinary incontinence  Well controlled. Refilled medication.     - oxybutynin ER (DITROPAN-XL) 10 MG 24 hr tablet; Take 1 tablet (10 mg) by mouth  "daily    Cervical high risk HPV (human papillomavirus) test positive  - Pap Screen with HPV - recommended age 30 - 65 years    Lipid screening  - Lipid panel reflex to direct LDL Fasting; Future  - Lipid panel reflex to direct LDL Fasting    Screening for endocrine, metabolic and immunity disorder  - Basic metabolic panel; Future  - Basic metabolic panel         Patient has been advised of split billing requirements and indicates understanding: Yes    COUNSELING:  Reviewed preventive health counseling, as reflected in patient instructions    Estimated body mass index is 37.43 kg/m  as calculated from the following:    Height as of this encounter: 1.702 m (5' 7\").    Weight as of this encounter: 108.4 kg (239 lb).    Weight management plan: See AVS.    She reports that she quit smoking about 6 years ago. Her smoking use included cigarettes. She has a 10.00 pack-year smoking history. She has never used smokeless tobacco.      Counseling Resources:  ATP IV Guidelines  Pooled Cohorts Equation Calculator  Breast Cancer Risk Calculator  BRCA-Related Cancer Risk Assessment: FHS-7 Tool  FRAX Risk Assessment  ICSI Preventive Guidelines  Dietary Guidelines for Americans, 2010  USDA's MyPlate  ASA Prophylaxis  Lung CA Screening    Marybel Aguiar MD  [facility]  "

## 2022-04-28 NOTE — PATIENT INSTRUCTIONS
Your BMI is Body mass index is Body mass index is 37.43 kg/m .     A BMI of 18.5 to 24.9 is in the healthy range. A person with a BMI of 25 to 29.9 is considered overweight, and someone with a BMI of 30 or greater is considered obese. More than two-thirds of American adults are considered overweight or obese.  Weight management is a personal decision.  If you are interested in exploring weight loss strategies, the following discussion covers the approaches that may be successful. Body mass index (BMI) is one way to tell whether you are at a healthy weight, overweight, or obese. It measures your weight in relation to your height.  Being overweight or obese increases the risk for further weight gain. Excess weight may lead to heart disease and diabetes.  Creating and following plans for healthy eating and physical activity may help you improve your health.  Weight control is part of healthy lifestyle and includes exercise, emotional health, and healthy eating habits. Careful eating habits lifelong are the mainstay of weight control. Though there are significant health benefits from weight loss, long-term weight loss with diet alone may be very difficult to achieve- studies show long-term success with dietary management alone in less than 10% of people. Attaining a healthy weight may be especially difficult to achieve in those with severe obesity. In some cases, medications, devices and surgical management might be considered.  What can you do?  Keep a food journal to help with mindful eating and finding ways to modify your diet.    Reduce the amount of processed food in your diet. Focus on adding vegetables, and lean proteins.  Reduce dietary carbohydrates. Limiting to  gm of carbohydrates per day has been shows to help boost weight loss.  If you have diabetes or are on diabetic medications do not do this without talking to your physician or healthcare provider.    Diet combined with exercise helps maintain  muscle while optimizing fat loss. Strength training is particularly important for building and maintaining muscle mass. Exercise helps reduce stress, increase energy, and improves fitness. Increasing exercise without diet control, however, may not burn enough calories to loose weight.       Start walking three days a week 10-20 minutes at a time  Work towards walking thirty minutes five days a week    Eventually, increase the speed of your walking for 1-2 minutes at time    In addition, we recommend that you review healthy lifestyles and methods for weight loss available through the National Institutes of Health patient information sites:  http://win.niddk.nih.gov/publications/index.htm    Also look into health and wellness programs that may be available through your health insurance provider, employer, local community center, or terri club.

## 2022-04-28 NOTE — LETTER
Opioid / Opioid Plus Controlled Substance Agreement    This is an agreement between you and your provider about the safe and appropriate use of controlled substance/opioids prescribed by your care team. Controlled substances are medicines that can cause physical and mental dependence (abuse).    There are strict laws about having and using these medicines. We here at [facility] are committing to working with you in your efforts to get better. To support you in this work, we ll help you schedule regular office appointments for medicine refills. If we must cancel or change your appointment for any reason, we ll make sure you have enough medicine to last until your next appointment.     As a Provider, I will:    Listen carefully to your concerns and treat you with respect.     Recommend a treatment plan that I believe is in your best interest. This plan may involve therapies other than opioid pain medication.     Talk with you often about the possible benefits, and the risk of harm of any medicine that we prescribe for you.     Provide a plan on how to taper (discontinue or go off) using this medicine if the decision is made to stop its use.    As a Patient, I understand that opioid(s):     Are a controlled substance prescribed by my care team to help me function or work and manage my condition(s).     Are strong medicines and can cause serious side effects such as:    Drowsiness, which can seriously affect my driving ability    A lower breathing rate, enough to cause death    Harm to my thinking ability     Depression     Abuse of and addiction to this medicine    Need to be taken exactly as prescribed. Combining opioids with certain medicines or chemicals (such as illegal drugs, sedatives, sleeping pills, and benzodiazepines) can be dangerous or even fatal. If I stop opioids suddenly, I may have severe withdrawal symptoms.    Do not work for all types of pain nor for all patients. If they re not helpful, I may  be asked to stop them.        The risks, benefits and side effects of these medicine(s) were explained to me. I agree that:  1. I will take part in other treatments as advised by my care team. This may be psychiatry or counseling, physical therapy, behavioral therapy, group treatment or a referral to a specialist.     2. I will keep all my appointments. I understand that this is part of the monitoring of opioids. My care team may require an office visit for EVERY opioid/controlled substance refill. If I miss appointments or don t follow instructions, my care team may stop my medicine.    3. I will take my medicines as prescribed. I will not change the dose or schedule unless my care team tells me to. There will be no refills if I run out early.     4. I may be asked to come to the clinic and complete a urine drug test or complete a pill count at any time. If I don t give a urine sample or participate in a pill count, the care team may stop my medicine.    5. I will only receive prescriptions from this clinic for chronic pain. If I am treated by another provider for acute pain issues, I will tell them that I am taking opioid pain medication for chronic pain and that I have a treatment agreement with this provider. I will inform my [facility] care team within one business day if I am given a prescription for any pain medication by another healthcare provider. My [facility] care team can contact other providers and pharmacists about my use of any medicines.    6. It is up to me to make sure that I don t run out of my medicines on weekends or holidays. If my care team is willing to refill my opioid prescription without a visit, I must request refills only during office hours. Refills may take up to 3 business days to process. I will use one pharmacy to fill all my opioid and other controlled substance prescriptions. I will notify the clinic about any changes to my insurance or medication  availability.    7. I am responsible for my prescriptions. If the medicine/prescription is lost, stolen or destroyed, it will not be replaced. I also agree not to share controlled substance medicines with anyone.    8. I am aware I should not use any illegal or recreational drugs. I agree not to drink alcohol unless my care team says I can.       9. If I enroll in the [place] [facility] program, I will tell my care team prior to my next refill.     10. I will tell my care team right away if I become pregnant, have a new medical problem treated outside of my regular clinic, or have a change in my medications.    11. I understand that this medicine can affect my thinking, judgment and reaction time. Alcohol and drugs affect the brain and body, which can affect the safety of my driving. Being under the influence of alcohol or drugs can affect my decision-making, behaviors, personal safety, and the safety of others. Driving while impaired (DWI) can occur if a person is driving, operating, or in physical control of a car, motorcycle, boat, snowmobile, ATV, motorbike, off-road vehicle, or any other motor vehicle ([place] Statute 169A.20). I understand the risk if I choose to drive or operate any vehicle or machinery.    I understand that if I do not follow any of the conditions above, my prescriptions or treatment may be stopped or changed.          Opioids  What You Need to Know    What are opioids?   Opioids are pain medicines that must be prescribed by a doctor. They are also known as narcotics.     Examples are:   1. morphine (MS Contin, Opal)  2. oxycodone (Oxycontin)  3. oxycodone and acetaminophen (Percocet)  4. hydrocodone and acetaminophen (Vicodin, Norco)   5. fentanyl patch (Duragesic)   6. hydromorphone (Dilaudid)   7. methadone  8. codeine (Tylenol #3)     What do opioids do well?   Opioids are best for severe short-term pain such as after a surgery or injury. They may work well for cancer pain. They may  help some people with long-lasting (chronic) pain.     What do opioids NOT do well?   Opioids never get rid of pain entirely, and they don t work well for most patients with chronic pain. Opioids don t reduce swelling, one of the causes of pain.                                    Other ways to manage chronic pain and improve function include:       Treat the health problem that may be causing pain    Anti-inflammation medicines, which reduce swelling and tenderness, such as ibuprofen (Advil, Motrin) or naproxen (Aleve)    Acetaminophen (Tylenol)    Antidepressants and anti-seizure medicines, especially for nerve pain    Topical treatments such as patches or creams    Injections or nerve blocks    Chiropractic or osteopathic treatment    Acupuncture, massage, deep breathing, meditation, visual imagery, aromatherapy    Use heat or ice at the pain site    Physical therapy     Exercise    Stop smoking    Take part in therapy       Risks and side effects     Talk to your doctor before you start or decide to keep taking opioids. Possible side effects include:      Lowering your breathing rate enough to cause death    Overdose, including death, especially if taking higher than prescribed doses    Worse depression symptoms; less pleasure in things you usually enjoy    Feeling tired or sluggish    Slower thoughts or cloudy thinking    Being more sensitive to pain over time; pain is harder to control    Trouble sleeping or restless sleep    Changes in hormone levels (for example, less testosterone)    Changes in sex drive or ability to have sex    Constipation    Unsafe driving    Itching and sweating    Dizziness    Nausea, throwing up and dry mouth    What else should I know about opioids?    Opioids may lead to dependence, tolerance, or addiction.      Dependence means that if you stop or reduce the medicine too quickly, you will have withdrawal symptoms. These include loose poop (diarrhea), jitters, flu-like symptoms,  nervousness and tremors. Dependence is not the same as addiction.                       Tolerance means needing higher doses over time to get the same effect. This may increase the chance of serious side effects.      Addiction is when people improperly use a substance that harms their body, their mind or their relations with others. Use of opiates can cause a relapse of addiction if you have a history of drug or alcohol abuse.      People who have used opioids for a long time may have a lower quality of life, worse depression, higher levels of pain and more visits to doctors.    You can overdose on opioids. Take these steps to lower your risk of overdose:    1. Recognize the signs:  Signs of overdose include decrease or loss of consciousness (blackout), slowed breathing, trouble waking up and blue lips. If someone is worried about overdose, they should call 911.    2. Talk to your doctor about Narcan (naloxone).   If you are at risk for overdose, you may be given a prescription for Narcan. This medicine very quickly reverses the effects of opioids.   If you overdose, a friend or family member can give you Narcan while waiting for the ambulance. They need to know the signs of overdose and how to give Narcan.     3. Don't use alcohol or street drugs.   Taking them with opioids can cause death.    4. Do not take any of these medicines unless your doctor says it s OK. Taking these with opioids can cause death:    Benzodiazepines, such as lorazepam (Ativan), alprazolam (Xanax) or diazepam (Valium)    Muscle relaxers, such as cyclobenzaprine (Flexeril)    Sleeping pills like zolpidem (Ambien)     Other opioids      How to keep you and other people safe while taking opioids:    1. Never share your opioids with others.  Opioid medicines are regulated by the Drug Enforcement Agency (JAMIE). Selling or sharing medications is a criminal act.    2. Be sure to store opioids in a secure place, locked up if possible. Young children  can easily swallow them and overdose.    3. When you are traveling with your medicines, keep them in the original bottles. If you use a pill box, be sure you also carry a copy of your medicine list from your clinic or pharmacy.    4. Safe disposal of opioids    Most pharmacies have places to get rid of medicine, called disposal kiosks. Medicine disposal options are also available in every [facility]. Search your county and  medication disposal  to find more options. You can find more details at:  [email or web address]-[email or web address]-medications     I agree that my provider, clinic care team, and pharmacy may work with any city, state or federal law enforcement agency that investigates the misuse, sale, or other diversion of my controlled medicine. I will allow my provider to discuss my care with, or share a copy of, this agreement with any other treating provider, pharmacy or emergency room where I receive care.    I have read this agreement and have asked questions about anything I did not understand.    _______________________________________________________  Patient Signature - Joana Arredondo _____________________                   Date     _______________________________________________________  Provider Signature - Marybel Aguiar MD   _____________________                   Date     _______________________________________________________  Witness Signature (required if provider not present while patient signing)   _____________________                   Date

## 2022-05-02 LAB
BKR LAB AP GYN ADEQUACY: NORMAL
BKR LAB AP GYN INTERPRETATION: NORMAL
BKR LAB AP HPV REFLEX: NORMAL
BKR LAB AP PREVIOUS ABNL DX: NORMAL
BKR LAB AP PREVIOUS ABNORMAL: NORMAL
PATH REPORT.COMMENTS IMP SPEC: NORMAL
PATH REPORT.COMMENTS IMP SPEC: NORMAL
PATH REPORT.RELEVANT HX SPEC: NORMAL

## 2022-05-03 ENCOUNTER — PATIENT OUTREACH (OUTPATIENT)
Dept: FAMILY MEDICINE | Facility: CLINIC | Age: 62
End: 2022-05-03
Payer: COMMERCIAL

## 2022-05-03 DIAGNOSIS — R87.810 CERVICAL HIGH RISK HPV (HUMAN PAPILLOMAVIRUS) TEST POSITIVE: ICD-10-CM

## 2022-05-03 LAB
HUMAN PAPILLOMA VIRUS 16 DNA: NEGATIVE
HUMAN PAPILLOMA VIRUS 18 DNA: POSITIVE
HUMAN PAPILLOMA VIRUS FINAL DIAGNOSIS: ABNORMAL
HUMAN PAPILLOMA VIRUS OTHER HR: NEGATIVE

## 2022-06-13 DIAGNOSIS — F41.9 ANXIETY: ICD-10-CM

## 2022-06-13 NOTE — TELEPHONE ENCOUNTER
Routing refill request to provider for review/approval because:  Drug not on the [facility] refill protocol     Pending Prescriptions:                       Disp   Refills    LORazepam (ATIVAN) 0.5 MG tablet [Pharmacy*20 tab*1        Sig: TAKE ONE TO TWO TABLETS (0.5-1MG) BY MOUTH EVERY 8           HOURS AS NEEDED FOR ANXIETY    Althea Jalloh RN on 6/13/2022 at 3:00 PM

## 2022-06-14 RX ORDER — LORAZEPAM 0.5 MG/1
TABLET ORAL
Qty: 20 TABLET | Refills: 1 | Status: SHIPPED | OUTPATIENT
Start: 2022-06-14 | End: 2023-04-14

## 2022-07-07 ENCOUNTER — MYC MEDICAL ADVICE (OUTPATIENT)
Dept: FAMILY MEDICINE | Facility: CLINIC | Age: 62
End: 2022-07-07

## 2022-07-07 DIAGNOSIS — M18.12 OSTEOARTHRITIS OF LEFT THUMB: Primary | ICD-10-CM

## 2022-07-07 NOTE — TELEPHONE ENCOUNTER
See my chart message.  Question regarding arthritis of thumb joint.  Rheum vs ortho or injection?    Althea Jalloh RN on 7/7/2022 at 1:29 PM

## 2022-07-26 ENCOUNTER — OFFICE VISIT (OUTPATIENT)
Dept: OBGYN | Facility: CLINIC | Age: 62
End: 2022-07-26
Payer: COMMERCIAL

## 2022-07-26 VITALS
BODY MASS INDEX: 37.57 KG/M2 | WEIGHT: 239.4 LBS | HEIGHT: 67 IN | HEART RATE: 78 BPM | DIASTOLIC BLOOD PRESSURE: 71 MMHG | TEMPERATURE: 97.6 F | SYSTOLIC BLOOD PRESSURE: 120 MMHG | RESPIRATION RATE: 18 BRPM

## 2022-07-26 DIAGNOSIS — R87.810 CERVICAL HIGH RISK HPV (HUMAN PAPILLOMAVIRUS) TEST POSITIVE: Primary | ICD-10-CM

## 2022-07-26 PROCEDURE — 88305 TISSUE EXAM BY PATHOLOGIST: CPT | Performed by: PATHOLOGY

## 2022-07-26 PROCEDURE — 57454 BX/CURETT OF CERVIX W/SCOPE: CPT | Performed by: OBSTETRICS & GYNECOLOGY

## 2022-07-26 NOTE — PROGRESS NOTES
GYN: Colposcopy/LEEP    Date/Time: 7/26/2022 9:52 AM  Performed by: Johny Moser MD  Authorized by: Johny Moser MD     Consent:     Consent obtained:  Written    Consent given by:  Patient    Procedural risks discussed:  Bleeding, infection, possible continued pain and repeat procedure    Patient questions answered: yes      Patient agrees, verbalizes understanding, and wants to proceed: yes      Educational handouts given: no      Instructions and paperwork completed: yes    Pre-procedure:     Pre-procedure timeout performed: yes      Premeds:  Ibuprofen    Prepped with: acetic acid and Lugol      Local anesthetic:  Lidocaine 1% W/O epi  Indication:     Indication:  HPV    HPV Indications:  18  Procedure:     Procedure: Colposcopy w/ cervical biopsy and ECC      Under satisfactory analgesia the patient was prepped and draped in the dorsal lithotomy position: yes      Delbarton speculum was placed in the vagina: yes      Under colposcopic examination the transition zone was seen in entirety: yes      Intracervical block was performed: yes      Endocervix was curetted using a Kevorkian curette: yes      Cervical biopsy performed with a cervical biopsy punch: yes      Tampon inserted: no      Monsel's solution was applied: yes      Biopsy(s): yes      Specimen to pathology: yes    Post-procedure:     Findings: Negative      Impression: Normal appearing cervix      Patient tolerance of procedure:  Patient tolerated the procedure well with no immediate complications  Comments:        (R87.810) Cervical high risk HPV (human papillomavirus) test positive  (primary encounter diagnosis)  Comment: cervical stenosis  Plan: Brush ECC  RTC 1 year  Johny Moser MD

## 2022-07-27 ENCOUNTER — PATIENT OUTREACH (OUTPATIENT)
Dept: OBGYN | Facility: CLINIC | Age: 62
End: 2022-07-27

## 2022-07-27 DIAGNOSIS — R87.810 CERVICAL HIGH RISK HPV (HUMAN PAPILLOMAVIRUS) TEST POSITIVE: ICD-10-CM

## 2022-07-27 LAB
PATH REPORT.COMMENTS IMP SPEC: NORMAL
PATH REPORT.FINAL DX SPEC: NORMAL
PATH REPORT.FINAL DX SPEC: NORMAL
PATH REPORT.GROSS SPEC: NORMAL
PATH REPORT.GROSS SPEC: NORMAL
PATH REPORT.MICROSCOPIC SPEC OTHER STN: NORMAL
PATH REPORT.MICROSCOPIC SPEC OTHER STN: NORMAL
PATH REPORT.RELEVANT HX SPEC: NORMAL
PATH REPORT.RELEVANT HX SPEC: NORMAL
PHOTO IMAGE: NORMAL
PHOTO IMAGE: NORMAL

## 2022-07-27 NOTE — TELEPHONE ENCOUNTER
7/26/22 Colpo bx non diagnostic (no tissue present), ECC neg but rare to absent endocervical mucosa present. Plan: shallow LEEP due before 10/26/22

## 2022-07-27 NOTE — RESULT ENCOUNTER NOTE
Please inform Joana of her normal results.  She has a cervical stenosis.  The endocervical sample is limited.  I would recommend that she have a 0.5 cm LEEP to open the canal and enable sampling of the endocervix  Johny Moser MD

## 2022-10-10 ENCOUNTER — HEALTH MAINTENANCE LETTER (OUTPATIENT)
Age: 62
End: 2022-10-10

## 2022-10-13 DIAGNOSIS — G89.29 CHRONIC LOW BACK PAIN WITHOUT SCIATICA, UNSPECIFIED BACK PAIN LATERALITY: ICD-10-CM

## 2022-10-13 DIAGNOSIS — M54.50 CHRONIC LOW BACK PAIN WITHOUT SCIATICA, UNSPECIFIED BACK PAIN LATERALITY: ICD-10-CM

## 2022-10-13 RX ORDER — HYDROCODONE BITARTRATE AND ACETAMINOPHEN 10; 325 MG/1; MG/1
TABLET ORAL
Qty: 30 TABLET | Refills: 0 | Status: SHIPPED | OUTPATIENT
Start: 2022-10-13 | End: 2023-01-02

## 2022-10-17 ENCOUNTER — ANCILLARY PROCEDURE (OUTPATIENT)
Dept: GENERAL RADIOLOGY | Facility: CLINIC | Age: 62
End: 2022-10-17
Attending: FAMILY MEDICINE
Payer: COMMERCIAL

## 2022-10-17 ENCOUNTER — OFFICE VISIT (OUTPATIENT)
Dept: ORTHOPEDICS | Facility: CLINIC | Age: 62
End: 2022-10-17
Attending: FAMILY MEDICINE
Payer: COMMERCIAL

## 2022-10-17 VITALS — BODY MASS INDEX: 37.5 KG/M2 | DIASTOLIC BLOOD PRESSURE: 84 MMHG | HEIGHT: 67 IN | SYSTOLIC BLOOD PRESSURE: 126 MMHG

## 2022-10-17 DIAGNOSIS — M79.642 LEFT HAND PAIN: ICD-10-CM

## 2022-10-17 DIAGNOSIS — M18.12 OSTEOARTHRITIS OF LEFT THUMB: Primary | ICD-10-CM

## 2022-10-17 PROCEDURE — 73130 X-RAY EXAM OF HAND: CPT | Mod: TC | Performed by: RADIOLOGY

## 2022-10-17 PROCEDURE — 99204 OFFICE O/P NEW MOD 45 MIN: CPT | Performed by: FAMILY MEDICINE

## 2022-10-17 NOTE — LETTER
10/17/2022         RE: Joana Arredondo  [address] [place] [address]        Dear Colleague,    Thank you for referring your patient, Joana Arredondo, to the [facility]. Please see a copy of my visit note below.    ASSESSMENT & PLAN    Joana was seen today for pain and pain.    Diagnoses and all orders for this visit:    Osteoarthritis of left thumb  -     Orthopedic  Referral    Left hand pain  -     XR Hand Left G/E 3 Views; Future      This issue is acute on chronic and Unchanged.    # Bilateral Thumb Pain Left/Right: Symptoms noted over the past year on the right side in several years in the left side. She has had pain with gripping things worse at the base of her thumb. There is tenderness to palpation over the left CMC joint worse with grinding. Reviewed x-rays today showing CMC arthritis on the left side, possibly mild on the right. Likely cause of her pain due to CMC arthritis. Testing for carpal tunnel syndrome is negative. Counseled patient on nature of condition and treatment options.  Given this plan as below, follow-up as needed  Image Findings: left CMC arthritis, mild right CMC arthritis   Treatment: Activities as tolerated, home exercises given today  Job: as tolerated   Medications/Injections: Limited tylenol/ibuprofen for pain for 1-2 weeks, defer steroid injections for now  Follow-up: In one month if symptoms do not improve, sooner if worsening  Can consider CMC joint steroid injections as needed please schedule 2 weeks prior to trips     Harley Peace MD  [facility]    -----  Chief Complaint   Patient presents with     Right Wrist - Pain     Left Wrist - Pain       SUBJECTIVE  Joana Arredondo is a/an 61 year old female who is seen in consultation at the request of  Marybel Aguiar M.D. for evaluation of bilateral wrist pain.     The patient is seen by themselves.  The  "patient is Right handed    Onset: 1 years(s) ago (right thumb) Several years (left thumb). Reports insidious onset without acute precipitating event. Saw PCP 4/28/22. Xrays of right wrist 2/14/22.   Location of Pain: bilateral CMC pain   Worsened by: left (constant throbbing), overuse, gripping, lifting, picking things up  Better with: wrist braces   Treatments tried: wrist brace, ibuprofen,    Associated symptoms: numbness in fingers (when she wakes up)   Left > right  Braces help  Orthopedic/Surgical history: NO  Social History/Occupation: Computer work, healthcare analyst   Ibuprofen 400 mg BID occasionally  Voltaren gel  No family history pertinent to patient's problem today.      REVIEW OF SYSTEMS:  Review of Systems  Constitutional, HEENT, cardiovascular, pulmonary, GI, , musculoskeletal, neuro, skin, endocrine and psych systems are negative, except as otherwise noted.    OBJECTIVE:  /84   Ht 1.702 m (5' 7\")   LMP 03/06/2011   BMI 37.50 kg/m     General: healthy, alert and in no distress  HEENT: no scleral icterus or conjunctival erythema  Skin: no suspicious lesions or rash. No jaundice.  CV: distal perfusion intact    Resp: normal respiratory effort without conversational dyspnea   Psych: normal mood and affect  Gait: normal steady gait with appropriate coordination and balance    Neuro: Normal light sensory exam of bilateral upper extremities    BILATERAL HAND  Inspection:    No swelling, bruising, discoloration, or obvious deformity or asymmetry  Palpation:   Carpals: normal   Metacarpals: normal   Thumb: pain over CMC left > right   Fingers: normal  Range of Motion:    Full active flexion and extension at MCP, PIP, and DIP joints; normal finger cascade without malrotation.  Wrist pronation, supination, and ulnar/radial deviation normal.  Strength:     full, extension full, flexion full, abduction full, adduction full, opposition full  Special Tests:    Positive: CMC grind left > right    " Negative: flexor digitorum superficialis testing, flexor digitorum profundus testing      RADIOLOGY:  I independently ordered, visualized and reviewed these images with the patient    CMC arthritis on left side      WRIST RIGHT THREE OR MORE VIEWS   DATE/TIME: 2/14/2022 1:04 PM     INDICATION: Right wrist pain  COMPARISON: None available.                                                                      IMPRESSION: Anatomic alignment right wrist. No acute displaced right  wrist fracture identified. Largely preserved wrist joint spaces. No  significant wrist soft tissue swelling.     AMISH TREJO MD     Review of external notes as documented elsewhere in note  Review of the result(s) of each unique test - bilateral hand x-rays       Disclaimer: This note consists of symbols derived from keyboarding, dictation and/or voice recognition software. As a result, there may be errors in the script that have gone undetected. Please consider this when interpreting information found in this chart.        Again, thank you for allowing me to participate in the care of your patient.        Sincerely,        Harley Peace MD

## 2022-10-17 NOTE — PROGRESS NOTES
ASSESSMENT & PLAN    Joana was seen today for pain and pain.    Diagnoses and all orders for this visit:    Osteoarthritis of left thumb  -     Orthopedic  Referral    Left hand pain  -     XR Hand Left G/E 3 Views; Future      This issue is acute on chronic and Unchanged.    # Bilateral Thumb Pain Left/Right: Symptoms noted over the past year on the right side in several years in the left side. She has had pain with gripping things worse at the base of her thumb. There is tenderness to palpation over the left CMC joint worse with grinding. Reviewed x-rays today showing CMC arthritis on the left side, possibly mild on the right. Likely cause of her pain due to CMC arthritis. Testing for carpal tunnel syndrome is negative. Counseled patient on nature of condition and treatment options.  Given this plan as below, follow-up as needed  Image Findings: left CMC arthritis, mild right CMC arthritis   Treatment: Activities as tolerated, home exercises given today  Job: as tolerated   Medications/Injections: Limited tylenol/ibuprofen for pain for 1-2 weeks, defer steroid injections for now  Follow-up: In one month if symptoms do not improve, sooner if worsening  Can consider CMC joint steroid injections as needed please schedule 2 weeks prior to trips     Harley Peace MD  [facility] [place]    -----  Chief Complaint   Patient presents with     Right Wrist - Pain     Left Wrist - Pain       SUBJECTIVE  Joana Arredondo is a/an 61 year old female who is seen in consultation at the request of  Marybel Aguiar M.D. for evaluation of bilateral wrist pain.     The patient is seen by themselves.  The patient is Right handed    Onset: 1 years(s) ago (right thumb) Several years (left thumb). Reports insidious onset without acute precipitating event. Saw PCP 4/28/22. Xrays of right wrist 2/14/22.   Location of Pain: bilateral CMC pain   Worsened by: left (constant throbbing),  "overuse, gripping, lifting, picking things up  Better with: wrist braces   Treatments tried: wrist brace, ibuprofen,    Associated symptoms: numbness in fingers (when she wakes up)   Left > right  Braces help  Orthopedic/Surgical history: NO  Social History/Occupation: Computer work, healthcare analyst   Ibuprofen 400 mg BID occasionally  Voltaren gel  No family history pertinent to patient's problem today.      REVIEW OF SYSTEMS:  Review of Systems  Constitutional, HEENT, cardiovascular, pulmonary, GI, , musculoskeletal, neuro, skin, endocrine and psych systems are negative, except as otherwise noted.    OBJECTIVE:  /84   Ht 1.702 m (5' 7\")   LMP 03/06/2011   BMI 37.50 kg/m     General: healthy, alert and in no distress  HEENT: no scleral icterus or conjunctival erythema  Skin: no suspicious lesions or rash. No jaundice.  CV: distal perfusion intact    Resp: normal respiratory effort without conversational dyspnea   Psych: normal mood and affect  Gait: normal steady gait with appropriate coordination and balance    Neuro: Normal light sensory exam of bilateral upper extremities    BILATERAL HAND  Inspection:    No swelling, bruising, discoloration, or obvious deformity or asymmetry  Palpation:   Carpals: normal   Metacarpals: normal   Thumb: pain over CMC left > right   Fingers: normal  Range of Motion:    Full active flexion and extension at MCP, PIP, and DIP joints; normal finger cascade without malrotation.  Wrist pronation, supination, and ulnar/radial deviation normal.  Strength:     full, extension full, flexion full, abduction full, adduction full, opposition full  Special Tests:    Positive: CMC grind left > right    Negative: flexor digitorum superficialis testing, flexor digitorum profundus testing      RADIOLOGY:  I independently ordered, visualized and reviewed these images with the patient    CMC arthritis on left side      WRIST RIGHT THREE OR MORE VIEWS   DATE/TIME: 2/14/2022 1:04 " PM     INDICATION: Right wrist pain  COMPARISON: None available.                                                                      IMPRESSION: Anatomic alignment right wrist. No acute displaced right  wrist fracture identified. Largely preserved wrist joint spaces. No  significant wrist soft tissue swelling.     AMISH TREJO MD     Review of external notes as documented elsewhere in note  Review of the result(s) of each unique test - bilateral hand x-rays       Disclaimer: This note consists of symbols derived from keyboarding, dictation and/or voice recognition software. As a result, there may be errors in the script that have gone undetected. Please consider this when interpreting information found in this chart.

## 2022-10-17 NOTE — PATIENT INSTRUCTIONS
# Bilateral Thumb Pain Left/Right: Symptoms noted over the past year on the right side in several years in the left side. She has had pain with gripping things worse at the base of her thumb. There is tenderness to palpation over the left CMC joint worse with grinding. Reviewed x-rays today showing CMC arthritis on the left side, possibly mild on the right. Likely cause of her pain due to CMC arthritis. Testing for carpal tunnel syndrome is negative. Counseled patient on nature of condition and treatment options.  Given this plan as below, follow-up as needed  Image Findings: left CMC arthritis, mild right CMC arthritis   Treatment: Activities as tolerated, home exercises given today  Job: as tolerated   Medications/Injections: Limited tylenol/ibuprofen for pain for 1-2 weeks, defer steroid injections for now  Follow-up: In one month if symptoms do not improve, sooner if worsening  Can consider CMC joint steroid injections as needed please schedule 2 weeks prior to trips    Please call [phone]   Ask for my team if you have any questions or concerns    If you have not yet received the influenza vaccine but would like to get one, please call  [phone] or you can schedule via [email or web address]    It was great seeing you today!    Harley Peace MD, [facility]

## 2022-11-04 ENCOUNTER — OFFICE VISIT (OUTPATIENT)
Dept: FAMILY MEDICINE | Facility: CLINIC | Age: 62
End: 2022-11-04
Payer: COMMERCIAL

## 2022-11-04 VITALS
HEIGHT: 67 IN | RESPIRATION RATE: 16 BRPM | BODY MASS INDEX: 37.35 KG/M2 | OXYGEN SATURATION: 97 % | HEART RATE: 85 BPM | SYSTOLIC BLOOD PRESSURE: 122 MMHG | WEIGHT: 238 LBS | TEMPERATURE: 98 F | DIASTOLIC BLOOD PRESSURE: 80 MMHG

## 2022-11-04 DIAGNOSIS — M17.11 PRIMARY OSTEOARTHRITIS OF RIGHT KNEE: Primary | ICD-10-CM

## 2022-11-04 DIAGNOSIS — N39.3 URINARY, INCONTINENCE, STRESS FEMALE: ICD-10-CM

## 2022-11-04 DIAGNOSIS — E66.01 MORBID OBESITY (H): ICD-10-CM

## 2022-11-04 DIAGNOSIS — K21.00 GASTROESOPHAGEAL REFLUX DISEASE WITH ESOPHAGITIS, UNSPECIFIED WHETHER HEMORRHAGE: ICD-10-CM

## 2022-11-04 PROCEDURE — 90471 IMMUNIZATION ADMIN: CPT | Performed by: FAMILY MEDICINE

## 2022-11-04 PROCEDURE — 90682 RIV4 VACC RECOMBINANT DNA IM: CPT | Performed by: FAMILY MEDICINE

## 2022-11-04 PROCEDURE — 99214 OFFICE O/P EST MOD 30 MIN: CPT | Mod: 25 | Performed by: FAMILY MEDICINE

## 2022-11-04 PROCEDURE — 91312 COVID-19,PF,PFIZER BOOSTER BIVALENT: CPT | Performed by: FAMILY MEDICINE

## 2022-11-04 PROCEDURE — 0124A COVID-19,PF,PFIZER BOOSTER BIVALENT: CPT | Performed by: FAMILY MEDICINE

## 2022-11-04 RX ORDER — OXYBUTYNIN CHLORIDE 5 MG/1
5 TABLET, EXTENDED RELEASE ORAL DAILY
Qty: 90 TABLET | Refills: 0 | Status: SHIPPED | OUTPATIENT
Start: 2022-11-04 | End: 2023-02-02

## 2022-11-04 RX ORDER — MELOXICAM 7.5 MG/1
7.5 TABLET ORAL DAILY
Qty: 90 TABLET | Refills: 3 | Status: SHIPPED | OUTPATIENT
Start: 2022-11-04 | End: 2023-10-06

## 2022-11-04 ASSESSMENT — PATIENT HEALTH QUESTIONNAIRE - PHQ9
SUM OF ALL RESPONSES TO PHQ QUESTIONS 1-9: 3
SUM OF ALL RESPONSES TO PHQ QUESTIONS 1-9: 3
10. IF YOU CHECKED OFF ANY PROBLEMS, HOW DIFFICULT HAVE THESE PROBLEMS MADE IT FOR YOU TO DO YOUR WORK, TAKE CARE OF THINGS AT HOME, OR GET ALONG WITH OTHER PEOPLE: NOT DIFFICULT AT ALL

## 2022-11-04 ASSESSMENT — PAIN SCALES - GENERAL: PAINLEVEL: MILD PAIN (2)

## 2022-11-04 NOTE — NURSING NOTE
"Initial /80   Pulse 85   Temp 98  F (36.7  C) (Tympanic)   Resp 16   Ht 1.702 m (5' 7\")   Wt 108 kg (238 lb)   LMP 03/06/2011   SpO2 97%   BMI 37.28 kg/m   Estimated body mass index is 37.28 kg/m  as calculated from the following:    Height as of this encounter: 1.702 m (5' 7\").    Weight as of this encounter: 108 kg (238 lb). .      "

## 2022-11-04 NOTE — PROGRESS NOTES
"  Assessment & Plan     Primary osteoarthritis of right knee  Stable. Refilled medication.    - meloxicam (MOBIC) 7.5 MG tablet; Take 1 tablet (7.5 mg) by mouth daily    Gastroesophageal reflux disease with esophagitis, unspecified whether hemorrhage  Stable. Refilled medication.    - omeprazole (PRILOSEC) 20 MG DR capsule; Take 1 capsule (20 mg) by mouth daily    Urinary, incontinence, stress female  Increase ditropan to 15mg daily. She just filled #90 of 10mg so will add #90 of 5 mg and then she can call for 15mg strength once those are used up/  - oxybutynin ER (DITROPAN XL) 5 MG 24 hr tablet; Take 1 tablet (5 mg) by mouth daily    Morbid obesity (H)  Comorbidities of: knee DJD . Weight loss could improve this.   - Comprehensive Weight Management; Future                   BMI:   Estimated body mass index is 37.28 kg/m  as calculated from the following:    Height as of this encounter: 1.702 m (5' 7\").    Weight as of this encounter: 108 kg (238 lb).           Return in about 3 months (around 2/4/2023) for medication re-check.    Marybel Aguiar MD  [facility]    Nichole Montero is a 61 year old, presenting for the following health issues:  Knee Pain      Knee Pain    History of Present Illness       Reason for visit:  Knee pain, discuss incontinence  Symptom onset:  More than a month  Symptoms include:  Knee pain incontinence  Symptom intensity:  Moderate  Symptom progression:  Staying the same  Had these symptoms before:  No    She eats 2-3 servings of fruits and vegetables daily.She consumes 0 sweetened beverage(s) daily.She exercises with enough effort to increase her heart rate 10 to 19 minutes per day.  She exercises with enough effort to increase her heart rate 3 or less days per week.   She is taking medications regularly.    Today's PHQ-9         PHQ-9 Total Score: 3    PHQ-9 Q9 Thoughts of better off dead/self-harm past 2 weeks :   Not at all    How difficult have " "these problems made it for you to do your work, take care of things at home, or get along with other people: Not difficult at all             Review of Systems   Constitutional, neuro, ENT, endocrine, pulmonary, cardiac, gastrointestinal, genitourinary, musculoskeletal, integument and psychiatric systems are negative, except as otherwise noted.       Objective    /80   Pulse 85   Temp 98  F (36.7  C) (Tympanic)   Resp 16   Ht 1.702 m (5' 7\")   Wt 108 kg (238 lb)   LMP 03/06/2011   SpO2 97%   BMI 37.28 kg/m    Body mass index is 37.28 kg/m .  Physical Exam   GENERAL: Pleasant, well appearing female.                 "

## 2022-11-22 PROBLEM — N39.3 URINARY, INCONTINENCE, STRESS FEMALE: Status: ACTIVE | Noted: 2022-11-22

## 2022-11-22 PROBLEM — K21.00 GASTROESOPHAGEAL REFLUX DISEASE WITH ESOPHAGITIS, UNSPECIFIED WHETHER HEMORRHAGE: Status: ACTIVE | Noted: 2022-11-22

## 2022-12-01 ENCOUNTER — TELEPHONE (OUTPATIENT)
Dept: FAMILY MEDICINE | Facility: CLINIC | Age: 62
End: 2022-12-01

## 2022-12-01 NOTE — TELEPHONE ENCOUNTER
Writer spoke with patient and informed her that referrals/prior auth. Are usually handled with the department/specialty that they are scheduled with.    Verbalized understanding.    Esme RUCKER

## 2022-12-01 NOTE — TELEPHONE ENCOUNTER
Reason for call:    Symptom or request:     Patient called stating that she has an appt with non surgical weight management on Tuesday, she has a referral, but was told to call insurance, she did, as it turns out she needs a Prior auth for appt.  Is this something that you can help with?      Best Time:  any    Can we leave a detailed message on this number?  YES     [employer]

## 2022-12-06 ENCOUNTER — TELEPHONE (OUTPATIENT)
Dept: ENDOCRINOLOGY | Facility: CLINIC | Age: 62
End: 2022-12-06

## 2022-12-06 ENCOUNTER — VIRTUAL VISIT (OUTPATIENT)
Dept: ENDOCRINOLOGY | Facility: CLINIC | Age: 62
End: 2022-12-06
Payer: COMMERCIAL

## 2022-12-06 VITALS — WEIGHT: 242 LBS | HEIGHT: 67 IN | BODY MASS INDEX: 37.98 KG/M2

## 2022-12-06 DIAGNOSIS — E66.01 CLASS 2 SEVERE OBESITY WITH SERIOUS COMORBIDITY AND BODY MASS INDEX (BMI) OF 35.0 TO 35.9 IN ADULT, UNSPECIFIED OBESITY TYPE (H): ICD-10-CM

## 2022-12-06 DIAGNOSIS — E66.01 MORBID OBESITY (H): ICD-10-CM

## 2022-12-06 DIAGNOSIS — E66.812 CLASS 2 SEVERE OBESITY WITH SERIOUS COMORBIDITY AND BODY MASS INDEX (BMI) OF 35.0 TO 35.9 IN ADULT, UNSPECIFIED OBESITY TYPE (H): ICD-10-CM

## 2022-12-06 PROCEDURE — 99205 OFFICE O/P NEW HI 60 MIN: CPT | Mod: 95

## 2022-12-06 ASSESSMENT — PAIN SCALES - GENERAL: PAINLEVEL: NO PAIN (0)

## 2022-12-06 NOTE — LETTER
"2022       RE: Joana Arredondo  [address] [place] [address]     Dear Colleague,    Thank you for referring your patient, Joana Arredondo, to the [facility] [place] [facility]. Please see a copy of my visit note below.    Joana Arredondo is a 62 year old who is being evaluated via a billable video visit.      How would you like to obtain your AVS? MyChart  If the video visit is dropped, the invitation should be resent by: Text to cell phone: [phone]  Will anyone else be joining your video visit? No  If patient encounters technical issues they should call [phone]    During this virtual visit the patient is located in [place], patient verifies this as the location during the entirety of this visit.     Video-Visit Details  Video Start Time: 2:04PM    Type of service:  Video Visit    Video End Time:2:58PM    Originating Location (pt. Location): Home  Distant Location (provider location):  [facility] [place]  Platform used for Video Visit: [email or web address]      65 minutes spent on the date of the encounter doing chart review, history and exam, documentation and further activities per the note    New Medical Weight Management Consult    PATIENT:  Joana Arredondo  MRN:         [id number]  :         1960  JUSTICE:         2022    Dear Dr. Kaylah No,    I had the pleasure of seeing your patient, Joana Arredondo. Full intake/assessment was done to determine barriers to weight loss success and develop a treatment plan. Joana Arredondo is a 62 year old female interested in treatment of medical problems associated with excess weight. She has a height of 5' 7\", a weight of 242 lbs 0 oz, and the calculated Body mass index is 37.9 kg/m .        Assessment & Plan   Problem List Items Addressed This Visit        Digestive    Class 2 severe obesity with serious comorbidity and " body mass index (BMI) of 37.0 to 37.9 in adult (H)     Obesity onset in Middle school. Gradual weight gain over the years. Chronic dieting off and on. Most successful with Overeaters Anonymous, but was not sustainable. Increase weight after loss of  and father 3 years ago. Currently struggles most with thoughts of food and increase hunger. Feels that she will eat when board and emotionally eat. Is concerned about how weight will influence her health overall, and wants to prevent diabetes etc.     Discussed AOMs today:   - Phentermine - previously tried. Side effects of insomnia and increased anxiety   - Topiramate - concern for brain fog   - Naltrexone - currently talking opioids as needed for chronic pain  - GLP-1 to be started today. Discussed side effects and benefits. Will monitor GERD symptoms. Start Saxenda ramp up. Will consider Ozempic if needed based on lab results.          Relevant Medications    liraglutide - Weight Management (SAXENDA) 18 MG/3ML pen    insulin pen needle (31G X 5 MM) 31G X 5 MM miscellaneous      1. Start Saxenda ramp up. Will consider transition to Wegovy at next visit. If not covered by insurance, will consider Ozempic labs dependent.   2. Labs ordered.   3. Follow up with Grisel Mathias in 5 weeks.          Joana Arredondo is a 62 year old female who presents to clinic today for the following health issues. She has the following co-morbidities:       12/1/2022   I have the following health issues associated with obesity: Pre-Diabetes, High Cholesterol, Sleep Apnea, Stress Incontinence   I have the following symptoms associated with obesity: Knee Pain, Depression, Back Pain, Fatigue     Chronic low back pain - takes oxycodone 5mg as needed. Takes it 1-2xmonth as needed for back strains. Just started to Meloxicam, and has been helping     NICOLETTE - borderline when tested. Not on CPAP.     GERD - started on omeprazole because of starting Meloxicam. Well controlled with medications  "    Anxiety/depression - mood more stable with medications. Followed by PCP. No therapist or psychiatrist.     Patient Goals 12/1/2022   I am interested in having a healthier weight to diminish current health problems: Yes   I am interested in having a healthier weight in order to prevent future health problems: Yes   I am interested in having a healthier weight in order to have a future surgery: No       Referring Provider 12/1/2022   Please name the provider who referred you to Medical Weight Management.  If you do not know, please answer: \"I Don't Know\". Dr Hattie Aguiar       Weight History 12/1/2022   How concerned are you about your weight? Very Concerned   Would you describe your weight gain as gradual? Yes   I became overweight: As a Teenager   The following factors have contributed to my weight gain:  Mental Health Issues, Eating Wrong Types of Food, Eating Too Much, Genetic (Runs in the Family), Stress   I have tried the following methods to lose weight: Watching Portions or Calories, Exercise, Weight Watchers, Atkins-type Diet (Low Carb/High Protein), Slim Fast or Other Liquid Diets   My lowest weight since age 19 was: 155   My highest weight since age 19 was: 242   The most weight I have ever lost was: (lbs) 60   I have the following family history of obesity/being overweight:  Many of my relatives are overweight   Has anyone in your family had weight loss surgery? Yes   How has your weight changed over the last year?  Gained   How many pounds? 15     Obesity onset in middle school. Has been dieting whole life. Has the cycle of either weight gain or dieting. Gradual weight gain. Extensive family history of obesity. 3 years ago lost  and father in same year. Has had increased emotional eating because of it. Has been able to lose weight in the past with overeaters anonymous, through strict diet and exercise. However, came with a lot of shame. Was able to get to 150lbs, and felt best. Has always " felt like she has woken up worrying about her diet. Concerned for health issues weight can cause.     Highest weight in life is today - 242lbs.     Eating 2 meals a day, w/ 1 snack. Increased thoughts of food. Eats out of boardem and emotion. Can get full, but at times can't stay full. Craves salty/fatty foods. Can get satisfied. Will eat out 1xweek. Does her own cooking and shopping.   Brunch - eggs + toast, left overs, salads, potstickers   Dinner - Meat + starch + vegetable   Drink - water, diet pop (1 can day), coffee w/ cream + splenda   ETOH - sober     Activity  - limited due to OA. Sits at desk most days for work. Decreased walking due to weather. Looking into joining gym.       AOMs:   -Fen-Phen - helped with hunger.   - Phentermine - side effects of insomnia and increase anxiety.     Diet Recall Review with Patient 12/1/2022   Do you typically eat breakfast? Yes   If you do eat breakfast, what types of food do you eat? eggs, toast, grits, sausage, fruit   Do you typically eat lunch? Yes   If you do eat lunch, what types of food do you typically eat?  leftovers, chips, soup, salad,   Do you typically eat supper? Yes   If you do eat supper, what types of food do you typically eat? meat, vegetable, starch   Do you typically eat snacks? Yes   If you do snack, what types of food do you typically eat? salty, chips, pretzels, nuts, cheese, crackers   Do you like vegetables?  Yes   Do you drink water? Yes   How many glasses of juice do you drink in a typical day? 0   How many of glasses of milk do you drink in a typical day? 0   If you do drink milk, what type? N/A   How many 8oz glasses of sugar containing drinks such as Oneil-Aid/sweet tea do you drink in a day? 0   How many cans/bottles of sugar pop/soda/tea/sports drinks do you drink in a day? 0   How many cans/bottles of diet pop/soda/tea or sports drink do you drink in a day? 2   How often do you have a drink of alcohol? Never       Eating Habits 12/1/2022    Generally, my meals include foods like these: bread, pasta, rice, potatoes, corn, crackers, sweet dessert, pop, or juice. Almost Everyday   Eat fast food (like [employer]onal[employer], [employer], Taco Bell). Less Than Weekly   Eat at a buffet or sit-down restaurant. Less Than Weekly   Eat most of my meals in front of the TV or computer. Almost Everyday   Often skip meals, eat at random times, have no regular eating times. Less Than Weekly   Rarely sit down for a meal but snack or graze throughout.  Less Than Weekly   Eat extra snacks between meals. Almost Everyday   Eat most of my food at the end of the day. Almost Everyday   Eat in the middle of the night or wake up at night to eat. Once a Week   Eat extra snacks to prevent or correct low blood sugar. Never   Eat to prevent acid reflux or stomach pain. Never   Worry about not having enough food to eat. Never   Have you been to the food shelf at least a few times this year? No   I eat when I am depressed. A Few Times a Week   I eat when I am stressed. Once a Week   I eat when I am bored. A Few Times a Week   I eat when I am anxious. A Few Times a Week   I eat when I am happy or as a reward. A Few Times a Week   I feel hungry all the time even if I just have eaten. A Few Times a Week   Feeling full is important to me. Almost Everyday   I finish all the food on my plate even if I am already full. Almost Everyday   I can't resist eating delicious food or walk past the good food/smell. Almost Everyday   I eat/snack without noticing that I am eating. Almost Everyday   I eat when I am preparing the meal. A Few Times a Week   I eat more than usual when I see others eating. Less Than Weekly   I have trouble not eating sweets, ice cream, cookies, or chips if they are around the house. Almost Everyday   I think about food all day. Almost Everyday   What foods, if any, do you crave? Chips/Crackers       Amount of Food 12/1/2022   I make myself vomit what I have eaten or use laxatives to  get rid of food. Never   I eat a large amount of food, like a loaf of bread, a box of cookies, a pint/quart of ice cream, all at once. Weekly   I eat a large amount of food even when I am not hungry. Weekly   I eat rapidly. Monthly   I eat alone because I feel embarrassed and do not want others to see how much I have eaten. Monthly   I eat until I am uncomfortably full. Monthly   I feel bad, disgusted, or guilty after I overeat. Weekly   I make myself vomit what I have eaten or use laxatives to get rid of food. Never       Activity/Exercise History 12/1/2022   How much of a typical 12 hour day do you spend lying down? Less Than Half the Day   How much of a typical day do you spend walking/standing? Less Than Half the Day   How many hours (not including work) do you spend on the TV/Video Games/Computer/Tablet/Phone? 2-3 Hours   How many times a week are you active for the purpose of exercise? 2-3 Times a Week   What keeps you from being more active? Other   How many total minutes do you spend doing some activity for the purpose of exercising when you exercise? More Than 30 Minutes       PAST MEDICAL HISTORY:  Past Medical History:   Diagnosis Date     Basal cell carcinoma      Cervical high risk HPV (human papillomavirus) test positive 02/18/2020 4/23/21     Neoplasm of uncertain behavior of skin 03/13/2012     Tobacco use disorder 11/30/2005    Quit October 2007       Work/Social History Reviewed With Patient 12/1/2022   My employment status is: Full-Time   My job is:    How much of your job is spent on the computer or phone? 100%   How many hours do you spend commuting to work daily?  0   What is your marital status? Single   Do you have children? Yes   If you have children, are they overweight? Yes   Who do you live with?  Alone   Who does the food shopping?  Me     Works for [employer]  [place]. [employer] work. Very supportive friends and family.   Sober for 30 years. Active in AA.     Mental Health  History Reviewed With Patient 12/1/2022   Have you ever been physically or sexually abused? No   If yes, do you feel that the abuse is affecting your weight? No   If yes, would you like to talk to a counselor about the abuse? N/A   How often in the past 2 weeks have you felt little interest or pleasure in doing things? For Several Days   Over the past 2 weeks how often have you felt down, depressed, or hopeless? For Several Days       Sleep History Reviewed With Patient 12/1/2022   How many hours do you sleep at night? 7   Do you think that you snore loudly or has anybody ever heard you snore loudly (louder than talking or so loud it can be heard behind a shut door)? No   Has anyone seen or heard you stop breathing during your sleep? No   Do you often feel tired, fatigued, or sleepy during the day? Yes   Do you have a TV/Computer in your bedroom? Yes       MEDICATIONS:   Current Outpatient Medications   Medication Sig Dispense Refill     CALCIUM + D OR 1 TABLET DAILY       FLUoxetine (PROZAC) 40 MG capsule Take 1 capsule (40 mg) by mouth daily 90 capsule 4     HYDROcodone-acetaminophen (NORCO)  MG per tablet TAKE 1 TABLET BY MOUTH EVERY 6 HOURS AS NEEDED FOR SEVERE PAIN 30 tablet 0     insulin pen needle (31G X 5 MM) 31G X 5 MM miscellaneous Use Saxenda pen needles daily or as directed. 100 each 1     liraglutide - Weight Management (SAXENDA) 18 MG/3ML pen Week 1: 0.6 mg subcutaneous daily, Week 2: 1.2 mg daily, Week 3: 1.8 mg daily, Week 4: 2.4 mg daily, Week 5 & on: 3 mg daily 15 mL 1     LORazepam (ATIVAN) 0.5 MG tablet TAKE ONE TO TWO TABLETS (0.5-1MG) BY MOUTH EVERY 8 HOURS AS NEEDED FOR ANXIETY 20 tablet 1     meloxicam (MOBIC) 7.5 MG tablet Take 1 tablet (7.5 mg) by mouth daily 90 tablet 3     methocarbamol (ROBAXIN) 750 MG tablet TAKE ONE TO ONE AND ONE-HALF TABLETS BY MOUTH THREE TIMES A DAY AS NEEDED FOR MUSCLE SPASMS 45 tablet 1     MULTIVITAMINS OR 1 daily        omeprazole (PRILOSEC) 20 MG   "capsule Take 1 capsule (20 mg) by mouth daily 90 capsule 4     oxybutynin ER (DITROPAN XL) 5 MG 24 hr tablet Take 1 tablet (5 mg) by mouth daily 90 tablet 0     oxybutynin ER (DITROPAN-XL) 10 MG 24 hr tablet Take 1 tablet (10 mg) by mouth daily 90 tablet 4     acetaminophen (TYLENOL) 650 MG CR tablet Take 1,300 mg by mouth every 8 hours as needed        ibuprofen 200 MG capsule Take 200 mg by mouth every 4 hours as needed for fever         ALLERGIES:   Allergies   Allergen Reactions     Nkda [No Known Drug Allergies]            Objective     Ht 1.702 m (5' 7\")   Wt 109.8 kg (242 lb)   LMP 03/06/2011   BMI 37.90 kg/m    Vitals - Patient Reported  Pain Score: No Pain (0)      Vitals:  No vitals were obtained today due to virtual visit.    Physical Exam   GENERAL: Healthy, alert and no distress  EYES: Eyes grossly normal to inspection.  No discharge or erythema, or obvious scleral/conjunctival abnormalities.  RESP: No audible wheeze, cough, or visible cyanosis.  No visible retractions or increased work of breathing.    SKIN: Visible skin clear. No significant rash, abnormal pigmentation or lesions.  NEURO: Cranial nerves grossly intact.  Mentation and speech appropriate for age.  PSYCH: Mentation appears normal, affect normal/bright, judgement and insight intact, normal speech and appearance well-groomed.     Anti-obesity medication ROS:    HEENT  Hx of glaucoma: No    Cardiovascular  CAD:No  HTN:No    Gastrointestinal  GERD:Yes  Constipation:No  Liver Dz:No  H/O Pancreatitis:No    Psychiatric  Bipolar: No  Anxiety:Yes  Depression:Yes  History of alcohol/drug abuse: Yes  Hx of eating disorder:No    Endocrine  Personal or family hx of MTC or MEN2:No  Diabetes/prediabetes: No    Neurologic:  Hx of seizures: No  Hx of migraines: No  Memory Impairment: No      History of kidney stones: No  Kidney disease: No  Current birth control: post menopause      Sincerely,    MYRA GOLD PA-C    "

## 2022-12-06 NOTE — NURSING NOTE
"(   Chief Complaint   Patient presents with     Consult    )    ( Weight: 242 lb (pt reported) )  ( Height: 5' 7\" )  ( BMI (Calculated): 37.9 )  (   )  (   )  (   )  (   )  (   )  (   )    (   )  (   )  (   )  (   )  (   )  (   )  (   )    (   Patient Active Problem List   Diagnosis     Chronic low back pain     NICOLETTE (obstructive sleep apnea)-Mild (AHI 6)     CARDIOVASCULAR SCREENING; LDL GOAL LESS THAN 130     Major depression in complete remission (H)     BMI 38.0-38.9,adult     Anxiety     Mixed stress and urge urinary incontinence     Controlled substance agreement signed     Advanced directives, counseling/discussion     Morbid obesity (H)     Cervical high risk HPV (human papillomavirus) test positive     Stenosis of cervix uteri     Major depression in complete remission (H)     Uncomplicated opioid dependence (H)     Gastroesophageal reflux disease with esophagitis, unspecified whether hemorrhage     Urinary, incontinence, stress female    )  (   Current Outpatient Medications   Medication Sig Dispense Refill     acetaminophen (TYLENOL) 650 MG CR tablet Take 1,300 mg by mouth every 8 hours as needed        CALCIUM + D OR 1 TABLET DAILY       FLUoxetine (PROZAC) 40 MG capsule Take 1 capsule (40 mg) by mouth daily 90 capsule 4     HYDROcodone-acetaminophen (NORCO)  MG per tablet TAKE 1 TABLET BY MOUTH EVERY 6 HOURS AS NEEDED FOR SEVERE PAIN 30 tablet 0     ibuprofen 200 MG capsule Take 200 mg by mouth every 4 hours as needed for fever       LORazepam (ATIVAN) 0.5 MG tablet TAKE ONE TO TWO TABLETS (0.5-1MG) BY MOUTH EVERY 8 HOURS AS NEEDED FOR ANXIETY 20 tablet 1     meloxicam (MOBIC) 7.5 MG tablet Take 1 tablet (7.5 mg) by mouth daily 90 tablet 3     methocarbamol (ROBAXIN) 750 MG tablet TAKE ONE TO ONE AND ONE-HALF TABLETS BY MOUTH THREE TIMES A DAY AS NEEDED FOR MUSCLE SPASMS 45 tablet 1     MULTIVITAMINS OR 1 daily        omeprazole (PRILOSEC) 20 MG DR capsule Take 1 capsule (20 mg) by mouth daily 90 " capsule 4     oxybutynin ER (DITROPAN XL) 5 MG 24 hr tablet Take 1 tablet (5 mg) by mouth daily 90 tablet 0     oxybutynin ER (DITROPAN-XL) 10 MG 24 hr tablet Take 1 tablet (10 mg) by mouth daily 90 tablet 4    )  ( Diabetes Eval:    )    ( Pain Eval:  No Pain (0) )    ( Wound Eval:       )    (   History   Smoking Status     Former     Packs/day: 0.50     Years: 20.00     Types: Cigarettes     Quit date: 1/1/2016   Smokeless Tobacco     Never    )    ( Signed By:  Javi Wilkerson, EMT; December 6, 2022; 1:17 PM )

## 2022-12-06 NOTE — TELEPHONE ENCOUNTER
Prior authorization is needed. Please start PA. Thank you!    [facility]    RX BIN: [id number]  RX PCN: [id number]   RX ID: [id number]  RX GRP:[id number]

## 2022-12-06 NOTE — PROGRESS NOTES
"Joana Arredondo is a 62 year old who is being evaluated via a billable video visit.      How would you like to obtain your AVS? MyChart  If the video visit is dropped, the invitation should be resent by: Text to cell phone: [phone]  Will anyone else be joining your video visit? No  If patient encounters technical issues they should call [phone]    During this virtual visit the patient is located in [place], patient verifies this as the location during the entirety of this visit.     Video-Visit Details  Video Start Time: 2:04PM    Type of service:  Video Visit    Video End Time:2:58PM    Originating Location (pt. Location): Home  Distant Location (provider location):  [facility] [place]  Platform used for Video Visit: Cardica      65 minutes spent on the date of the encounter doing chart review, history and exam, documentation and further activities per the note    New Medical Weight Management Consult    PATIENT:  Joana Arredondo  MRN:         [id number]  :         1960  JUSTICE:         2022    Dear Dr. Kaylah No,    I had the pleasure of seeing your patient, Joana Arredondo. Full intake/assessment was done to determine barriers to weight loss success and develop a treatment plan. Joana Arredondo is a 62 year old female interested in treatment of medical problems associated with excess weight. She has a height of 5' 7\", a weight of 242 lbs 0 oz, and the calculated Body mass index is 37.9 kg/m .        Assessment & Plan   Problem List Items Addressed This Visit        Digestive    Class 2 severe obesity with serious comorbidity and body mass index (BMI) of 37.0 to 37.9 in adult (H)     Obesity onset in Middle school. Gradual weight gain over the years. Chronic dieting off and on. Most successful with Overeaters Anonymous, but was not sustainable. Increase weight after loss of  and father 3 years ago. Currently struggles most with thoughts of food and " increase hunger. Feels that she will eat when board and emotionally eat. Is concerned about how weight will influence her health overall, and wants to prevent diabetes etc.     Discussed AOMs today:   - Phentermine - previously tried. Side effects of insomnia and increased anxiety   - Topiramate - concern for brain fog   - Naltrexone - currently talking opioids as needed for chronic pain  - GLP-1 to be started today. Discussed side effects and benefits. Will monitor GERD symptoms. Start Saxenda ramp up. Will consider Ozempic if needed based on lab results.          Relevant Medications    liraglutide - Weight Management (SAXENDA) 18 MG/3ML pen    insulin pen needle (31G X 5 MM) 31G X 5 MM miscellaneous      1. Start Saxenda ramp up. Will consider transition to Wegovy at next visit. If not covered by insurance, will consider Ozempic labs dependent.   2. Labs ordered.   3. Follow up with Grisel Mathias in 5 weeks.          Joana Arredondo is a 62 year old female who presents to clinic today for the following health issues. She has the following co-morbidities:       12/1/2022   I have the following health issues associated with obesity: Pre-Diabetes, High Cholesterol, Sleep Apnea, Stress Incontinence   I have the following symptoms associated with obesity: Knee Pain, Depression, Back Pain, Fatigue     Chronic low back pain - takes oxycodone 5mg as needed. Takes it 1-2xmonth as needed for back strains. Just started to Meloxicam, and has been helping     NICOLETTE - borderline when tested. Not on CPAP.     GERD - started on omeprazole because of starting Meloxicam. Well controlled with medications     Anxiety/depression - mood more stable with medications. Followed by PCP. No therapist or psychiatrist.     Patient Goals 12/1/2022   I am interested in having a healthier weight to diminish current health problems: Yes   I am interested in having a healthier weight in order to prevent future health problems: Yes   I am interested  "in having a healthier weight in order to have a future surgery: No       Referring Provider 12/1/2022   Please name the provider who referred you to Medical Weight Management.  If you do not know, please answer: \"I Don't Know\". Dr Hattie Aguiar       Weight History 12/1/2022   How concerned are you about your weight? Very Concerned   Would you describe your weight gain as gradual? Yes   I became overweight: As a Teenager   The following factors have contributed to my weight gain:  Mental Health Issues, Eating Wrong Types of Food, Eating Too Much, Genetic (Runs in the Family), Stress   I have tried the following methods to lose weight: Watching Portions or Calories, Exercise, Weight Watchers, Atkins-type Diet (Low Carb/High Protein), Slim Fast or Other Liquid Diets   My lowest weight since age 19 was: 155   My highest weight since age 19 was: 242   The most weight I have ever lost was: (lbs) 60   I have the following family history of obesity/being overweight:  Many of my relatives are overweight   Has anyone in your family had weight loss surgery? Yes   How has your weight changed over the last year?  Gained   How many pounds? 15     Obesity onset in middle school. Has been dieting whole life. Has the cycle of either weight gain or dieting. Gradual weight gain. Extensive family history of obesity. 3 years ago lost  and father in same year. Has had increased emotional eating because of it. Has been able to lose weight in the past with overeaters anonymous, through strict diet and exercise. However, came with a lot of shame. Was able to get to 150lbs, and felt best. Has always felt like she has woken up worrying about her diet. Concerned for health issues weight can cause.     Highest weight in life is today - 242lbs.     Eating 2 meals a day, w/ 1 snack. Increased thoughts of food. Eats out of boardem and emotion. Can get full, but at times can't stay full. Craves salty/fatty foods. Can get satisfied. Will " eat out 1xweek. Does her own cooking and shopping.   Brunch - eggs + toast, left overs, salads, potstickers   Dinner - Meat + starch + vegetable   Drink - water, diet pop (1 can day), coffee w/ cream + splenda   ETOH - sober     Activity  - limited due to OA. Sits at desk most days for work. Decreased walking due to weather. Looking into joining gym.       AOMs:   -Fen-Phen - helped with hunger.   - Phentermine - side effects of insomnia and increase anxiety.     Diet Recall Review with Patient 12/1/2022   Do you typically eat breakfast? Yes   If you do eat breakfast, what types of food do you eat? eggs, toast, grits, sausage, fruit   Do you typically eat lunch? Yes   If you do eat lunch, what types of food do you typically eat?  leftovers, chips, soup, salad,   Do you typically eat supper? Yes   If you do eat supper, what types of food do you typically eat? meat, vegetable, starch   Do you typically eat snacks? Yes   If you do snack, what types of food do you typically eat? salty, chips, pretzels, nuts, cheese, crackers   Do you like vegetables?  Yes   Do you drink water? Yes   How many glasses of juice do you drink in a typical day? 0   How many of glasses of milk do you drink in a typical day? 0   If you do drink milk, what type? N/A   How many 8oz glasses of sugar containing drinks such as Oneil-Aid/sweet tea do you drink in a day? 0   How many cans/bottles of sugar pop/soda/tea/sports drinks do you drink in a day? 0   How many cans/bottles of diet pop/soda/tea or sports drink do you drink in a day? 2   How often do you have a drink of alcohol? Never       Eating Habits 12/1/2022   Generally, my meals include foods like these: bread, pasta, rice, potatoes, corn, crackers, sweet dessert, pop, or juice. Almost Everyday   Eat fast food (like [employer], [employer], [employer]aco [employer]). Less Than Weekly   Eat at a buffet or sit-down restaurant. Less Than Weekly   Eat most of my meals in front of the TV or computer. Almost  Everyday   Often skip meals, eat at random times, have no regular eating times. Less Than Weekly   Rarely sit down for a meal but snack or graze throughout.  Less Than Weekly   Eat extra snacks between meals. Almost Everyday   Eat most of my food at the end of the day. Almost Everyday   Eat in the middle of the night or wake up at night to eat. Once a Week   Eat extra snacks to prevent or correct low blood sugar. Never   Eat to prevent acid reflux or stomach pain. Never   Worry about not having enough food to eat. Never   Have you been to the food shelf at least a few times this year? No   I eat when I am depressed. A Few Times a Week   I eat when I am stressed. Once a Week   I eat when I am bored. A Few Times a Week   I eat when I am anxious. A Few Times a Week   I eat when I am happy or as a reward. A Few Times a Week   I feel hungry all the time even if I just have eaten. A Few Times a Week   Feeling full is important to me. Almost Everyday   I finish all the food on my plate even if I am already full. Almost Everyday   I can't resist eating delicious food or walk past the good food/smell. Almost Everyday   I eat/snack without noticing that I am eating. Almost Everyday   I eat when I am preparing the meal. A Few Times a Week   I eat more than usual when I see others eating. Less Than Weekly   I have trouble not eating sweets, ice cream, cookies, or chips if they are around the house. Almost Everyday   I think about food all day. Almost Everyday   What foods, if any, do you crave? Chips/Crackers       Amount of Food 12/1/2022   I make myself vomit what I have eaten or use laxatives to get rid of food. Never   I eat a large amount of food, like a loaf of bread, a box of cookies, a pint/quart of ice cream, all at once. Weekly   I eat a large amount of food even when I am not hungry. Weekly   I eat rapidly. Monthly   I eat alone because I feel embarrassed and do not want others to see how much I have eaten. Monthly    I eat until I am uncomfortably full. Monthly   I feel bad, disgusted, or guilty after I overeat. Weekly   I make myself vomit what I have eaten or use laxatives to get rid of food. Never       Activity/Exercise History 12/1/2022   How much of a typical 12 hour day do you spend lying down? Less Than Half the Day   How much of a typical day do you spend walking/standing? Less Than Half the Day   How many hours (not including work) do you spend on the TV/Video Games/Computer/Tablet/Phone? 2-3 Hours   How many times a week are you active for the purpose of exercise? 2-3 Times a Week   What keeps you from being more active? Other   How many total minutes do you spend doing some activity for the purpose of exercising when you exercise? More Than 30 Minutes       PAST MEDICAL HISTORY:  Past Medical History:   Diagnosis Date     Basal cell carcinoma      Cervical high risk HPV (human papillomavirus) test positive 02/18/2020 4/23/21     Neoplasm of uncertain behavior of skin 03/13/2012     Tobacco use disorder 11/30/2005    Quit October 2007       Work/Social History Reviewed With Patient 12/1/2022   My employment status is: Full-Time   My job is:    How much of your job is spent on the computer or phone? 100%   How many hours do you spend commuting to work daily?  0   What is your marital status? Single   Do you have children? Yes   If you have children, are they overweight? Yes   Who do you live with?  Alone   Who does the food shopping?  Me     Works for [employer]. [employer] work. Very supportive friends and family.   Sober for 30 years. Active in AA.     Mental Health History Reviewed With Patient 12/1/2022   Have you ever been physically or sexually abused? No   If yes, do you feel that the abuse is affecting your weight? No   If yes, would you like to talk to a counselor about the abuse? N/A   How often in the past 2 weeks have you felt little interest or pleasure in doing things? For Several Days    Over the past 2 weeks how often have you felt down, depressed, or hopeless? For Several Days       Sleep History Reviewed With Patient 12/1/2022   How many hours do you sleep at night? 7   Do you think that you snore loudly or has anybody ever heard you snore loudly (louder than talking or so loud it can be heard behind a shut door)? No   Has anyone seen or heard you stop breathing during your sleep? No   Do you often feel tired, fatigued, or sleepy during the day? Yes   Do you have a TV/Computer in your bedroom? Yes       MEDICATIONS:   Current Outpatient Medications   Medication Sig Dispense Refill     CALCIUM + D OR 1 TABLET DAILY       FLUoxetine (PROZAC) 40 MG capsule Take 1 capsule (40 mg) by mouth daily 90 capsule 4     HYDROcodone-acetaminophen (NORCO)  MG per tablet TAKE 1 TABLET BY MOUTH EVERY 6 HOURS AS NEEDED FOR SEVERE PAIN 30 tablet 0     insulin pen needle (31G X 5 MM) 31G X 5 MM miscellaneous Use Saxenda pen needles daily or as directed. 100 each 1     liraglutide - Weight Management (SAXENDA) 18 MG/3ML pen Week 1: 0.6 mg subcutaneous daily, Week 2: 1.2 mg daily, Week 3: 1.8 mg daily, Week 4: 2.4 mg daily, Week 5 & on: 3 mg daily 15 mL 1     LORazepam (ATIVAN) 0.5 MG tablet TAKE ONE TO TWO TABLETS (0.5-1MG) BY MOUTH EVERY 8 HOURS AS NEEDED FOR ANXIETY 20 tablet 1     meloxicam (MOBIC) 7.5 MG tablet Take 1 tablet (7.5 mg) by mouth daily 90 tablet 3     methocarbamol (ROBAXIN) 750 MG tablet TAKE ONE TO ONE AND ONE-HALF TABLETS BY MOUTH THREE TIMES A DAY AS NEEDED FOR MUSCLE SPASMS 45 tablet 1     MULTIVITAMINS OR 1 daily        omeprazole (PRILOSEC) 20 MG DR capsule Take 1 capsule (20 mg) by mouth daily 90 capsule 4     oxybutynin ER (DITROPAN XL) 5 MG 24 hr tablet Take 1 tablet (5 mg) by mouth daily 90 tablet 0     oxybutynin ER (DITROPAN-XL) 10 MG 24 hr tablet Take 1 tablet (10 mg) by mouth daily 90 tablet 4     acetaminophen (TYLENOL) 650 MG CR tablet Take 1,300 mg by mouth every 8 hours as  "needed        ibuprofen 200 MG capsule Take 200 mg by mouth every 4 hours as needed for fever         ALLERGIES:   Allergies   Allergen Reactions     Nkda [No Known Drug Allergies]            Objective    Ht 1.702 m (5' 7\")   Wt 109.8 kg (242 lb)   LMP 03/06/2011   BMI 37.90 kg/m    Vitals - Patient Reported  Pain Score: No Pain (0)      Vitals:  No vitals were obtained today due to virtual visit.    Physical Exam   GENERAL: Healthy, alert and no distress  EYES: Eyes grossly normal to inspection.  No discharge or erythema, or obvious scleral/conjunctival abnormalities.  RESP: No audible wheeze, cough, or visible cyanosis.  No visible retractions or increased work of breathing.    SKIN: Visible skin clear. No significant rash, abnormal pigmentation or lesions.  NEURO: Cranial nerves grossly intact.  Mentation and speech appropriate for age.  PSYCH: Mentation appears normal, affect normal/bright, judgement and insight intact, normal speech and appearance well-groomed.     Anti-obesity medication ROS:    HEENT  Hx of glaucoma: No    Cardiovascular  CAD:No  HTN:No    Gastrointestinal  GERD:Yes  Constipation:No  Liver Dz:No  H/O Pancreatitis:No    Psychiatric  Bipolar: No  Anxiety:Yes  Depression:Yes  History of alcohol/drug abuse: Yes  Hx of eating disorder:No    Endocrine  Personal or family hx of MTC or MEN2:No  Diabetes/prediabetes: No    Neurologic:  Hx of seizures: No  Hx of migraines: No  Memory Impairment: No      History of kidney stones: No  Kidney disease: No  Current birth control: post menopause      Sincerely,    MYRA GOLD PA-C  "

## 2022-12-06 NOTE — PROGRESS NOTES
"Joana Arredondo is a 62 year old who is being evaluated via a billable video visit.      How would you like to obtain your AVS? MyChart  If the video visit is dropped, the invitation should be resent by: Text to cell phone: [phone]  Will anyone else be joining your video visit? No  If patient encounters technical issues they should call [phone]    During this virtual visit the patient is located in [place], patient verifies this as the location during the entirety of this visit.     Video-Visit Details  Video Start Time: {video visit start/end time for provider to select:328949}    Type of service:  Video Visit    Video End Time:{video visit start/end time for provider to select:152948}    Originating Location (pt. Location): {video visit patient location:201890::\"Home\"}  Distant Location (provider location):  [facility] [place]  Platform used for Video Visit: {Virtual Visit Platforms:616687::\"Expert TA\"}    Jin Wilkerson, EMT-P 12/6/2022      1:18 PM    "

## 2022-12-06 NOTE — TELEPHONE ENCOUNTER
"Prior Authorization Retail Medication Request    Medication/Dose: Saxenda  ICD code (if different than what is on RX):  Class 2 severe obesity with serious comorbidity and body mass index (BMI) of 35.0 to 35.9 in adult, unspecified obesity type (H) [E66.01, Z68.35]     Previously Tried and Failed:  Watching Portions or Calories, Exercise, Weight Watchers, Atkins-type Diet (Low Carb/High Protein), Slim Fast or Other Liquid Diets  Rationale:  I had the pleasure of seeing your patient, Joana Arredondo. Full intake/assessment was done to determine barriers to weight loss success and develop a treatment plan. Joana Arredondo is a 62 year old female interested in treatment of medical problems associated with excess weight. She has the following co-morbidities: Pre-Diabetes, High Cholesterol, Sleep Apnea, Stress Incontinence.      Ht 1.702 m (5' 7\")   Wt 109.8 kg (242 lb)    BMI 37.90 kg/m       Insurance Name:    Insurance ID:        Pharmacy Information (if different than what is on RX)  Name:  [facility] [place], [place] - [address] [address]  Phone:  [phone]  "

## 2022-12-07 NOTE — ASSESSMENT & PLAN NOTE
Obesity onset in Middle school. Gradual weight gain over the years. Chronic dieting off and on. Most successful with Overeaters Anonymous, but was not sustainable. Increase weight after loss of  and father 3 years ago. Currently struggles most with thoughts of food and increase hunger. Feels that she will eat when board and emotionally eat. Is concerned about how weight will influence her health overall, and wants to prevent diabetes etc.     Discussed AOMs today:   - Phentermine - previously tried. Side effects of insomnia and increased anxiety   - Topiramate - concern for brain fog   - Naltrexone - currently talking opioids as needed for chronic pain  - GLP-1 to be started today. Discussed side effects and benefits. Will monitor GERD symptoms. Start Saxenda ramp up. Will consider Ozempic if needed based on lab results.

## 2022-12-07 NOTE — PATIENT INSTRUCTIONS
"Thank you for allowing us the privilege of caring for you. We hope we provided you with the excellent service you deserve.   Please let us know if there is anything else we can do for you so that we can be sure you are completely satisfied with your care experience.    To ensure the quality of our services you may be receiving a patient satisfaction survey from an independent patient satisfaction monitoring company.    The greatest compliment you can give is a \"Likely to Recommend\"    Your visit was with MYRA GOLD PA-C today.    Instructions per today's visit:     Cole Arredondo, it was great to visit with you today.  Here is a review of our visit.  If our clinic scheduler is not able to reach you please call [phone] to schedule your next appointments.    1. Start Saxenda ramp up. Will consider transition to Wegovy at next visit. If not covered by insurance, will consider Ozempic labs dependent.   2. Labs are needed. Please call [phone] to schedule a lab only appointment at [facility] lab.  3. Follow up with Meena in 5 weeks.      Information about Video Visits with [facility]: video visit information  _________________________________________________________________________________________________________________________________________________________  If you are asked by your clinic team to have your blood pressure checked:  [facility] do offer several locations for blood pressure checks. Please follow the below link to schedule an appointment. Scheduling an appointment at the pharmacy for a blood pressure check is now preferred.    Appointment Plus (appointment-plus.Shopcliq)  _________________________________________________________________________________________________________________________________________________________  Important contact and scheduling information:  Please call our contact center at [phone] to schedule your next appointments.  To find a " lab location near you, please call [phone].  For any nursing questions or concerns call Mary Salazar LPN at [phone] or Miguelina Gna RN at [phone]  Please call during clinic hours Monday through Friday 8:00a - 4:00p if you have questions or you can contact us via Hello Curryhart at anytime and we will reply during clinic hours.    Lab results will be communicated through My Chart or letter (if My Chart not used). Please call the clinic if you have not received communication after 1 week or if you have any questions.?  Clinic Fax: [phone]    _________________________________________________________________________________________________________________________________________________________  Meal Replacement Products:    Here is the link to our new e-store where you can purchase our meal replacement products     [employer] [place] E-Store  [employer] [email or web address]    The one week starter kit is a great way to sample a variety of products and see what works for you.    If you want more information about the product go to: Fresh Steps KonteraepsCancerIQ.Surphace    If you are an employee or [facility] or  [employer] [place] please contact your care team for a 10% estore discount    Free Shipping for orders over $75     Benefits of meal replacements products:    Portion and calorie control  Improved nutrition  Structured eating  Simplified food choices  Avoid contact with trigger foods  _________________________________________________________________________________________________________________________________________________________  Interested in working with a health ?  Health coaches work with you to improve your overall health and wellbeing.  They look at the whole person, and may involve discussion of different areas of life, including, but not limited to the four pillars of health (sleep, exercise, nutrition, and stress management). Discuss with your care team if you would  like to start working a health .  Health Coaching-3 Pack: Schedule by calling [phone]    $99 for three health coaching visits    Visits may be done in person or via phone    Coaching is a partnership between the  and the client; Coaches do not prescribe or diagnose    Coaching helps inspire the client to reach his/her personal goals   _________________________________________________________________________________________________________________________________________________________  24 Week Healthy Lifestyle Plan:    Our mission in the 24-week Healthy Lifestyle Plan is to provide you with individualized care by giving you the tools, education and support you need to lose weight and maintain a healthy lifestyle. In your 24-week journey, you ll be supported by a dedicated weight loss team that includes registered dietitians, medical weight management providers, health coaches, and nurses -- all with special expertise in weight loss -- to help you every step of the way.     Monthly meetings with your registered dietician or medical weight management provider help to review your progress, update your care plan, and make any adjustments needed to ensure success. Between these visits, weekly and bi-weekly health  visits will help you focus on the four pillars of weight loss -- stress, sleep, nutrition, and exercise -- and how you can best adapt each to achieve sustainable weight loss results.    In addition, you will be given exclusive access to online wellbeing classes through [employer].  Your initial visit will be with a medical weight management provider who will help to understand your weight loss goals and ensure this program is the right fit for you. Please let our team know if you are interested in the 24 week plan by sending a message to your care team or calling [phone] to  "schedule.  _________________________________________________________________________________________________________________________________________________________    COMPREHENSIVE WEIGHT MANAGEMENT PROGRAM  VIRTUAL SUPPORT GROUPS    For Support Group Information:      We offer support groups for patients who are working on weight loss and considering, preparing for or have had weight loss surgery.   There is no cost for this opportunity.  You are invited to attend the?Virtual Support Groups?provided by any of the following locations:    [facility] via Microsoft Teams with Cynthia Larsen RN  2.   [place] via SixDoors with Otto Kirk, PhD, LP  3.   [place] via SixDoors with Cecilia Stephenson RN  4.   [facility] via [employer]in [employer] Teams with Cecilia De La Rosa [facility]    The following Support Group information can also be found on our website:  [email or web address]    [facility] Weight Loss Surgery Support Group    [facility] Weight Loss Surgery Support Group  The support group is a patient-lead forum that meets monthly to share experiences, encouragement and education. It is open to those who have had weight loss surgery, are scheduled for surgery, and those who are considering surgery.   WHEN: This group meets on the 3rd Wednesday of each month from 5:00PM - 6:00PM virtually using Microsoft Teams.   FACILITATOR: Led by Cynthia Larsen, MADDISON, LD, RN, the program's Clinical Coordinator.   TO REGISTER: Please contact the clinic via [employer]ear[employer] or call the nurse line directly at [phone] to inform our staff that you would like an invite sent to you and to let us know the email you would like the invite sent to. Prior to the meeting, a link with directions on how to join the meeting will be sent to you.    2022 Meetings  Flower 15: \"Let's Talk\" a time for the group to share.  July 20: \"Let's Talk\" a time for the group to " "share.  August 17: \"Let's Talk\" a time for the group to share.  September 21: \"Let's Talk\" a time for the group to share.  October 19: Guest Speaker: Dr Paolo Harrell MD Pulmonologist and Sleep Medicine Physician, \"Getting a Good Night's Sleep\".  November 16: \"Let's Talk\" a time for the group to share.  December 21: \"Let's Talk\" a time for the group to share.    [facility] Support Groups    Connections: Bariatric Care Support Group?  This is open to all [facility] (and those external to this program) pre- and post- operative bariatric surgery patients as well as their support system.   WHEN: This group meets the 2nd Tuesday of each month from 6:30 PM - 8:00 PM virtually using Microsoft Teams.   FACILITATOR: Led by Otto Kirk, Ph.D who is a Licensed Psychologist with the [facility] Comprehensive Weight Management Program.   TO REGISTER: Please send an email to Otto Kirk, Ph.D., LP at?[email or web address]?if you would like an invitation to the group and to learn about using Microsoft Teams.    2022 Meetings  June 14: Triny Johnson RD, LD at [facility], \"Nutritional Labeling\"  July 12 August 2 (Please Note Date Change)  September 13 October 11 November 8 December 13    Connections: Post-Operative Bariatric Surgery Support Group  This is a support group for [facility] bariatric patients (and those external to [facility]) who have had bariatric surgery and are at least 3 months post-surgery.  WHEN: This support group meets the 4th Wednesday of the month from 11:00 AM - 12:00 PM virtually using Microsoft Teams.   FACILITATOR: Led by Certified Bariatric Nurse, Cecilia Stephenson RN.   TO REGISTER: Please send an email to Cecilia at [email or web address] if you would like an invitation to the group and to learn about using Microsoft Teams.    2022 Meetings June 22 July 27 August 24 September 28 October 26 November 23 December " "28      [facility] Healthy Lifestyle Virtual Support Group    Healthy Lifestyle Virtual Support Group?  This is 60 minutes of small group guided discussion, support and resources. All are welcome who want a healthy lifestyle.  WHEN: This group meets monthly on a Friday from 12:30 PM - 1:30 PM virtually using Microsoft Teams.   FACILITATOR: Led by National Board Certified Health and , Cecilia De La Rosa [facility].   TO REGISTER: Please send an email to Cecilia [email or web address] to receive monthly invites to the group or if you have any questions about having a health .  Prior to the meeting, a link with directions on how to join the meeting will be sent to you.    2022 Meetings  June 24: Cecilia De La Rosa [facility], \"Setting Limits and Boundaries\".  Jul 29: Open Forum  August 26: Guest Speaker: Deborah Black Registered Dietitian  September 30: Open Forum  October 28th: Guest Speaker: Elizabeth Hwang [facility], Health , \"Gratitude Practices\".  November 18: Guest Speaker: Aliya Ruff RD Registered Dietitian, \"Navigating How to Eat around the Holidays\".  December 16: Guest Speaker: Emilia Villegas [facility], \"Changing Your Relationship with Movement\".    ____________________________________________________________________________________________________________________________________________________________________________  [facility] Get Moving Program  Our team of physical therapists is trained to help you understand and take control of your condition. They will perform a thorough evaluation to determine your ability for activity and develop a customized plan to fit your goals and physical ability.  Scheduling: Unsure if the Get Moving program is right for you? Discuss the program with your medical provider or diabetes educator. You can also call us at [phone] to ask questions or schedule an appointment.   [facility] Get Moving " Program  ____________________________________________________________________________________________________________________________________________________________________________  [facility] Diabetes Prevention Program (DPP)  If you have prediabetes and Medicare please contact us via [employer]art to learn more about the Diabetes Prevention Program (DPP)  Program Details:  [facility] offers the year-long Diabetes Prevention Program (DPP). The program helps you to make lifestyle changes that prevent or delay type 2 diabetes by supporting healthy eating, increased physical activity, stress reduction and use of coping skills.   On average, previous [facility] DPP cohorts have lost and maintained at least 5% of their starting weight throughout the program and averaged more than 150 minutes of physical activity per week.  Participants meet weekly for one-hour group sessions over sixteen weeks, every other week for the next 8 weeks, and monthly for the last six months.   A year-long maintenance program is also available for participants who complete the first year.   Location & Cost:   During the COVID-19 Public Health Emergency, the program is offered virtually. When in-person classes can resume, they will be held at [facility].  For people with Medicare, the program is covered in full. A self-pay option will also be available for those with non-Medicare insurance plans.   _________________________________________________________________________________________________________________________________________________________  Bluetooth Scale:    We hope to provide you with high quality virtual healthcare visits while social distancing for COVID-19 is necessary, as well as in the future when virtual visits may be more convenient for you.     Our technology team made it possible for Bluetooth scales to send weight measurements to our electronic medical record. This allows  weights from you weighing at home to securely flow into the medical record, which will improve telephone and virtual visits.   Additionally, studies have shown that adults actually lose more weight when their weights are automatically sent to someone else, and also that this process is not stressful for those adults.    Below is a link for purchasing the scale, with a discount code for our patients. You may call your insurance company to see if they will reimburse you for the cost of the scale, as a piece of durable medical equipment. The scales only go up to a weight of 400 pounds. This is an issue and we are working with the developer on increasing this. We found no scales that go over 400lb that have blue-tooth for connecting to [place].    Scale to purchase: the [employer].com  Body  Scale: [email or web address].[email or web address]/body/shop?[email or web address]=aw.ds    Discount Code: We have a discount code for our patients to bring the cost down to $50, Discount code is: [place]_Scale_20%off  _______________________________________________________________________________________________________________________________________________________________________________    To work with a Behavioral Health Psychologist:    Call to schedule:    Cristofer Snow - [phone]  Elias Sherman - [phone]  Olga Mcgee - [phone]  Sharon Collins - [phone]   Indu Sullivan PhD (cannot accept Medicare) [phone]        Thank you,   [facility] Comprehensive Weight Management Team      SAXENDA (liralutide)    We are considering starting a GLP-1 (Glucagon-like Peptide-1) medication called Saxenda. One of the ways it works is by slowing down the rate that food leaves your stomach. You feel crawford and will eat less. It also helps regulate hormones that can help improve your blood sugars.    If you are a patient that checks blood sugars, continue to check as instructed by  your doctor. Low blood sugars are rare but can happen if patients are on insulin or other oral agents. If you notice consistent low sugars or high sugars, your medication may need to be adjusted after your appointment. If this is the case, please call RN and provide her your blood sugar record from the last 3-4 days. The RN will get in touch with the doctor and call you back/Soundtrackerhart message with recommendations. We tolerate high sugars for a bit, so if sugars are running 180-200, this is ok. As weight starts dropping the blood sugars should too. If readings are consistently over 200 for 1-2 weeks, then you should call the doctor/nurse.    Dosing for this medication:   Week 1- Inject 0.6 mg daily  Week 2- Inject 1.2 mg daily  Week 3- Inject 1.8 mg daily  Week 4- Inject 2.4 mg daily  Week 5 and thereafter- Inject 3.0 mg daily    Side effects of GLP- Medications include: The most common side effects are all GI related and consist of: nausea, constipation, diarrhea, burping, or gassiness. Patients are advised to eat slowly and less, and nausea typically passes if people can stick it out.     The risk of pancreatitis (inflammation of the pancreas) has been associated with this type of medication, but is very rare.  If you have had pancreatitis in the past, this medication may not be for you. Please let us know about any past history of pancreas problems.    Symptoms of pancreatitis include: Pain in your upper stomach area which may travel to your back and be worse after eating. Your stomach area may be tender to the touch.  You may have vomiting or nausea and/or have a fever. If you should develop any of these symptoms, stop the medication and contact your primary care doctor. They will do a blood test to check for pancreatitis.         There is a small chance you may have some low blood sugar after taking the medication.   The signs of low blood sugar are:  Weakness  Shaky   Hungry  Sweating  Confusion      See below  for ways to treat low blood sugar without adding in lots of extra calories.      Treating Low Blood Sugar    If you have symptoms of low blood sugar (sweating, shaking, dizzy, confused) eat 15 grams of carbs and wait 15 minutes:    Glucose Tabs are best for sugars under 70 -  Dex4 or BD Glucose tablets are good, you will need to take 3-4 of these to equal 15 grams.     One small box of raisins  4 oz fruit juice box or   cup fruit juice  1 small apple  1 small banana    cup canned fruit in water    English muffin or a slice of bread with jelly   1 low fat frozen waffle with sugar-free syrup    cup cottage cheese with   cup frozen or fresh blueberries  1 cup skim or low-fat milk    cup whole grain cereal  4-6 crackers such as Triscuits      This medication is usually not covered by insurance and can be quite expensive. Sometimes a prior authorization is required, which may take up to 1-2 weeks for an insurance company to make a decision if they will cover the medication. Please be patient, you will be notified after a decision has been made.    For any questions or concerns please send a Fastgen message to our team or call our weight management call center at [phone] during regular business hours. For questions during evenings or weekends your messages will be addressed during the next business day.  For emergencies please call 911 or seek immediate medical care.      (Do not stop taking it if you don't think it's working. For some people it works without them knowing it.)     In order to get refills of this or any medication we prescribe you must be seen in the medical weight mgmt clinic every 2-4 months. Please have your pharmacy fax a refill request to [phone].

## 2022-12-07 NOTE — TELEPHONE ENCOUNTER
Prior Authorization Approval    Authorization Effective Date: 12/7/2022  Authorization Expiration Date: 4/7/2023  Medication: Saxenda-PA APPROVED   Approved Dose/Quantity:   Reference #:     Insurance Company: [employer] - Phone [phone] Fax [phone]  Expected CoPay:       CoPay Card Available:      Foundation Assistance Needed:    Which Pharmacy is filling the prescription (Not needed for infusion/clinic administered): [facility] [place], [place] - [address]  Pharmacy Notified: Yes- **Instructed pharmacy to notify patient when script is ready to /ship.**-Pharmacy requesting script be sent to filling pharmacy to process and fill. Pharmacy will notify the patient on medication.   Patient Notified: Yes

## 2022-12-07 NOTE — TELEPHONE ENCOUNTER
Central Prior Authorization Team   Phone: [phone]    PA Initiation    Medication: Saxenda  Insurance Company: [employer] - Phone [phone] Fax [phone]  Pharmacy Filling the Rx: [place], [place] - [address]  Filling Pharmacy Phone: [phone]  Filling Pharmacy Fax: [phone]  Start Date: 12/7/2022

## 2022-12-09 ENCOUNTER — LAB (OUTPATIENT)
Dept: LAB | Facility: CLINIC | Age: 62
End: 2022-12-09
Payer: COMMERCIAL

## 2022-12-09 DIAGNOSIS — E66.812 CLASS 2 SEVERE OBESITY WITH SERIOUS COMORBIDITY AND BODY MASS INDEX (BMI) OF 35.0 TO 35.9 IN ADULT, UNSPECIFIED OBESITY TYPE (H): ICD-10-CM

## 2022-12-09 DIAGNOSIS — E66.01 CLASS 2 SEVERE OBESITY WITH SERIOUS COMORBIDITY AND BODY MASS INDEX (BMI) OF 35.0 TO 35.9 IN ADULT, UNSPECIFIED OBESITY TYPE (H): ICD-10-CM

## 2022-12-09 LAB
ALBUMIN SERPL BCG-MCNC: 4.2 G/DL (ref 3.5–5.2)
ALP SERPL-CCNC: 127 U/L (ref 35–104)
ALT SERPL W P-5'-P-CCNC: 21 U/L (ref 10–35)
ANION GAP SERPL CALCULATED.3IONS-SCNC: 10 MMOL/L (ref 7–15)
AST SERPL W P-5'-P-CCNC: 20 U/L (ref 10–35)
BILIRUB SERPL-MCNC: 0.4 MG/DL
BUN SERPL-MCNC: 23.6 MG/DL (ref 8–23)
CALCIUM SERPL-MCNC: 9.9 MG/DL (ref 8.8–10.2)
CHLORIDE SERPL-SCNC: 102 MMOL/L (ref 98–107)
CREAT SERPL-MCNC: 0.76 MG/DL (ref 0.51–0.95)
DEPRECATED CALCIDIOL+CALCIFEROL SERPL-MC: 13 UG/L (ref 20–75)
DEPRECATED HCO3 PLAS-SCNC: 27 MMOL/L (ref 22–29)
ERYTHROCYTE [DISTWIDTH] IN BLOOD BY AUTOMATED COUNT: 12.6 % (ref 10–15)
GFR SERPL CREATININE-BSD FRML MDRD: 89 ML/MIN/1.73M2
GLUCOSE SERPL-MCNC: 126 MG/DL (ref 70–99)
HBA1C MFR BLD: 6.1 % (ref 0–5.6)
HCT VFR BLD AUTO: 41.8 % (ref 35–47)
HGB BLD-MCNC: 13.9 G/DL (ref 11.7–15.7)
MCH RBC QN AUTO: 31 PG (ref 26.5–33)
MCHC RBC AUTO-ENTMCNC: 33.3 G/DL (ref 31.5–36.5)
MCV RBC AUTO: 93 FL (ref 78–100)
PLATELET # BLD AUTO: 236 10E3/UL (ref 150–450)
POTASSIUM SERPL-SCNC: 4.1 MMOL/L (ref 3.4–5.3)
PROT SERPL-MCNC: 7.1 G/DL (ref 6.4–8.3)
PTH-INTACT SERPL-MCNC: 60 PG/ML (ref 15–65)
RBC # BLD AUTO: 4.48 10E6/UL (ref 3.8–5.2)
SODIUM SERPL-SCNC: 139 MMOL/L (ref 136–145)
WBC # BLD AUTO: 7.2 10E3/UL (ref 4–11)

## 2022-12-09 PROCEDURE — 85027 COMPLETE CBC AUTOMATED: CPT

## 2022-12-09 PROCEDURE — 83970 ASSAY OF PARATHORMONE: CPT

## 2022-12-09 PROCEDURE — 36415 COLL VENOUS BLD VENIPUNCTURE: CPT

## 2022-12-09 PROCEDURE — 83036 HEMOGLOBIN GLYCOSYLATED A1C: CPT

## 2022-12-09 PROCEDURE — 82306 VITAMIN D 25 HYDROXY: CPT

## 2022-12-09 PROCEDURE — 80053 COMPREHEN METABOLIC PANEL: CPT

## 2022-12-12 DIAGNOSIS — E66.01 CLASS 2 SEVERE OBESITY WITH SERIOUS COMORBIDITY AND BODY MASS INDEX (BMI) OF 35.0 TO 35.9 IN ADULT, UNSPECIFIED OBESITY TYPE (H): Primary | ICD-10-CM

## 2022-12-12 DIAGNOSIS — E66.812 CLASS 2 SEVERE OBESITY WITH SERIOUS COMORBIDITY AND BODY MASS INDEX (BMI) OF 35.0 TO 35.9 IN ADULT, UNSPECIFIED OBESITY TYPE (H): Primary | ICD-10-CM

## 2023-01-06 PROBLEM — R87.810 CERVICAL HIGH RISK HPV (HUMAN PAPILLOMAVIRUS) TEST POSITIVE: Status: ACTIVE | Noted: 2020-02-18

## 2023-01-10 VITALS — WEIGHT: 233 LBS | BODY MASS INDEX: 36.57 KG/M2 | HEIGHT: 67 IN

## 2023-01-10 NOTE — PROGRESS NOTES
Virtual Visit Check-In    During this virtual visit the patient is located in [place], patient verifies this as the location during the entirety of this visit.     Charlotte Montero is a 62 year old who is being evaluated via a billable video visit.      How would you like to obtain your AVS? MyChart  If the video visit is dropped, the invitation should be resent by: Text to cell phone: [phone]  Will anyone else be joining your video visit? No        Video-Visit Details    Type of service:  Video Visit     Originating Location (pt. Location): Home    Distant Location (provider location):  Off-site  Platform used for Video Visit: [employer]         Video Start Time: 7:28AM  Video End Time:7:54AM    Yadira Vasquez NREMT      Return Medical Weight Management Note     Joana Arredondo  MRN:  [id number]  :  1960  JUSTICE:  2023    Dear Marybel Aguiar MD,    I had the pleasure of seeing your patient Joana Arredondo. She is a 62 year old female who I am continuing to see for treatment of obesity related to:       2022   I have the following health issues associated with obesity: Pre-Diabetes, High Cholesterol, Sleep Apnea, Stress Incontinence   I have the following symptoms associated with obesity: Knee Pain, Depression, Back Pain, Fatigue       Assessment & Plan   Problem List Items Addressed This Visit        Digestive    Class 2 severe obesity with serious comorbidity and body mass index (BMI) of 37.0 to 37.9 in adult (H) - Primary     Has ramped up on Saxenda to 3.0mg without concern. Tolerating well. Has been helpful with decrease in hunger and thoughts of food. Would like to continue with Saxenda at this point. Will discuss possible to transition to Wegovy in future if needed.          Relevant Medications    liraglutide - Weight Management (SAXENDA) 18 MG/3ML pen      1. Continue Saxenda 3.0mg daily. Refilled sent. Can consider Wegovy in the future if needed.   2. Follow up with Grisel Mathias in 1 month    3. Will check Vitamin D lab next month    INTERVAL HISTORY:  New MWM - 12/6/2022  Started Saxenda      Anti-obesity medications:     Current:   Saxenda 3.0mg - Has been at this dose for a week. Had some slight nausea at first, but has improved. Has had some dry mouth. Otherwise no other side effects. Has helped with decrease thoughts of food, decrease need for snacking. Has helped with portion sizes.       Recent diet changes:  Trying to be conscious of food. Still ate during holiday parties, but was way more moderate. Trying to make a few changes     Recent exercise/activity changes: Harder in the winter. Activity with grandson and shoveling snow. Activity around the house.     Recent stressors:  Been good, has been working on mental health     Recent sleep changes: Sleeping well    Vitamins/Labs: Started Vitamin D 6000IU    CURRENT WEIGHT:   233 lbs 0 oz    Initial Weight (lbs): 242 lbs  Last Visits Weight: 109.8 kg (242 lb)  Cumulative weight loss (lbs): 9  Weight Loss Percentage: 3.72%    Changes and Difficulties 1/9/2023   I have made the following changes to my diet since my last visit: Not snacking. Eating healthier meals.   With regards to my diet, I am still struggling with: Doing much better.   I have made the following changes to my activity/exercise since my last visit: N/A   With regards to my activity/exercise, I am still struggling with: N/A         MEDICATIONS:   Current Outpatient Medications   Medication Sig Dispense Refill     acetaminophen (TYLENOL) 650 MG CR tablet Take 1,300 mg by mouth every 8 hours as needed        CALCIUM + D OR 1 TABLET DAILY       FLUoxetine (PROZAC) 40 MG capsule Take 1 capsule (40 mg) by mouth daily 90 capsule 4     HYDROcodone-acetaminophen (NORCO)  MG per tablet TAKE 1 TABLET BY MOUTH EVERY 6 HOURS AS NEEDED FOR SEVERE PAIN 30 tablet 0     ibuprofen 200 MG capsule Take 200 mg by mouth every 4 hours as needed for fever       insulin pen needle (31G X 5 MM) 31G X  "5 MM miscellaneous Use Saxenda pen needles daily or as directed. 100 each 1     liraglutide - Weight Management (SAXENDA) 18 MG/3ML pen 3 mg daily 15 mL 1     LORazepam (ATIVAN) 0.5 MG tablet TAKE ONE TO TWO TABLETS (0.5-1MG) BY MOUTH EVERY 8 HOURS AS NEEDED FOR ANXIETY 20 tablet 1     meloxicam (MOBIC) 7.5 MG tablet Take 1 tablet (7.5 mg) by mouth daily 90 tablet 3     methocarbamol (ROBAXIN) 750 MG tablet TAKE ONE TO ONE AND ONE-HALF TABLETS BY MOUTH THREE TIMES A DAY AS NEEDED FOR MUSCLE SPASMS 45 tablet 1     MULTIVITAMINS OR 1 daily        omeprazole (PRILOSEC) 20 MG DR capsule Take 1 capsule (20 mg) by mouth daily 90 capsule 4     oxybutynin ER (DITROPAN XL) 5 MG 24 hr tablet Take 1 tablet (5 mg) by mouth daily 90 tablet 0     oxybutynin ER (DITROPAN-XL) 10 MG 24 hr tablet Take 1 tablet (10 mg) by mouth daily 90 tablet 4     Vitamin D3 (CHOLECALCIFEROL) 125 MCG (5000 UT) tablet 6000 UNITs         Weight Loss Medication History Reviewed With Patient 1/9/2023   Which weight loss medications are you currently taking on a regular basis?  Saxenda (liraglutide)   Are you having any side effects from the weight loss medication that we have prescribed you? Yes   If you are having side effects please describe: Very dry mouth           PHYSICAL EXAM:  Objective    Ht 1.702 m (5' 7\")   Wt 105.7 kg (233 lb)   LMP 03/06/2011   BMI 36.49 kg/m             Vitals:  No vitals were obtained today due to virtual visit.    GENERAL: Healthy, alert and no distress  EYES: Eyes grossly normal to inspection.  No discharge or erythema, or obvious scleral/conjunctival abnormalities.  RESP: No audible wheeze, cough, or visible cyanosis.  No visible retractions or increased work of breathing.    SKIN: Visible skin clear. No significant rash, abnormal pigmentation or lesions.  NEURO: Cranial nerves grossly intact.  Mentation and speech appropriate for age.  PSYCH: Mentation appears normal, affect normal/bright, judgement and insight " intact, normal speech and appearance well-groomed.        Sincerely,    MYRA GOLD PA-C      30 minutes spent on the date of the encounter doing chart review, history and exam, documentation and further activities per the note

## 2023-01-10 NOTE — NURSING NOTE
Chief Complaint   Patient presents with     Follow Up     Return weight management.         Vitals:    01/10/23 1418   Weight: 233 lb       Body mass index is 36.49 kg/m .      Yadira Vasquez, EMT  [facility]

## 2023-01-11 ENCOUNTER — VIRTUAL VISIT (OUTPATIENT)
Dept: ENDOCRINOLOGY | Facility: CLINIC | Age: 63
End: 2023-01-11
Payer: COMMERCIAL

## 2023-01-11 DIAGNOSIS — E66.01 CLASS 2 SEVERE OBESITY WITH SERIOUS COMORBIDITY AND BODY MASS INDEX (BMI) OF 37.0 TO 37.9 IN ADULT, UNSPECIFIED OBESITY TYPE (H): Primary | ICD-10-CM

## 2023-01-11 DIAGNOSIS — E66.812 CLASS 2 SEVERE OBESITY WITH SERIOUS COMORBIDITY AND BODY MASS INDEX (BMI) OF 37.0 TO 37.9 IN ADULT, UNSPECIFIED OBESITY TYPE (H): Primary | ICD-10-CM

## 2023-01-11 PROCEDURE — 99214 OFFICE O/P EST MOD 30 MIN: CPT | Mod: GT

## 2023-01-11 NOTE — LETTER
2023       RE: Joana Arredondo  [address] [place] [address]     Dear Colleague,    Thank you for referring your patient, Joana Arredondo, to the [facility] [place] [facility]. Please see a copy of my visit note below.    Virtual Visit Check-In    During this virtual visit the patient is located in [place], patient verifies this as the location during the entirety of this visit.     Charlotte Montero is a 62 year old who is being evaluated via a billable video visit.      How would you like to obtain your AVS? MyChart  If the video visit is dropped, the invitation should be resent by: Text to cell phone: [phone]  Will anyone else be joining your video visit? No        Video-Visit Details    Type of service:  Video Visit     Originating Location (pt. Location): Home    Distant Location (provider location):  Off-site  Platform used for Video Visit: Track         Video Start Time: 7:28AM  Video End Time:7:54AM    Yadira Vasquez NREMT      Return Medical Weight Management Note     Joana Arredondo  MRN:  [id number]  :  1960  JUSTICE:  2023    Dear Marybel Aguiar MD,    I had the pleasure of seeing your patient Joana Arredondo. She is a 62 year old female who I am continuing to see for treatment of obesity related to:       2022   I have the following health issues associated with obesity: Pre-Diabetes, High Cholesterol, Sleep Apnea, Stress Incontinence   I have the following symptoms associated with obesity: Knee Pain, Depression, Back Pain, Fatigue       Assessment & Plan   Problem List Items Addressed This Visit        Digestive    Class 2 severe obesity with serious comorbidity and body mass index (BMI) of 37.0 to 37.9 in adult (H) - Primary     Has ramped up on Saxenda to 3.0mg without concern. Tolerating well. Has been helpful with decrease in hunger and thoughts of food. Would like to  continue with Saxenda at this point. Will discuss possible to transition to Wegovy in future if needed.          Relevant Medications    liraglutide - Weight Management (SAXENDA) 18 MG/3ML pen      1. Continue Saxenda 3.0mg daily. Refilled sent. Can consider Wegovy in the future if needed.   2. Follow up with Grisel Mathias in 1 month   3. Will check Vitamin D lab next month    INTERVAL HISTORY:  New MWM - 12/6/2022  Started Saxenda      Anti-obesity medications:     Current:   Saxenda 3.0mg - Has been at this dose for a week. Had some slight nausea at first, but has improved. Has had some dry mouth. Otherwise no other side effects. Has helped with decrease thoughts of food, decrease need for snacking. Has helped with portion sizes.       Recent diet changes:  Trying to be conscious of food. Still ate during holiday parties, but was way more moderate. Trying to make a few changes     Recent exercise/activity changes: Harder in the winter. Activity with grandson and shoveling snow. Activity around the house.     Recent stressors:  Been good, has been working on mental health     Recent sleep changes: Sleeping well    Vitamins/Labs: Started Vitamin D 6000IU    CURRENT WEIGHT:   233 lbs 0 oz    Initial Weight (lbs): 242 lbs  Last Visits Weight: 109.8 kg (242 lb)  Cumulative weight loss (lbs): 9  Weight Loss Percentage: 3.72%    Changes and Difficulties 1/9/2023   I have made the following changes to my diet since my last visit: Not snacking. Eating healthier meals.   With regards to my diet, I am still struggling with: Doing much better.   I have made the following changes to my activity/exercise since my last visit: N/A   With regards to my activity/exercise, I am still struggling with: N/A         MEDICATIONS:   Current Outpatient Medications   Medication Sig Dispense Refill     acetaminophen (TYLENOL) 650 MG CR tablet Take 1,300 mg by mouth every 8 hours as needed        CALCIUM + D OR 1 TABLET DAILY       FLUoxetine  "(PROZAC) 40 MG capsule Take 1 capsule (40 mg) by mouth daily 90 capsule 4     HYDROcodone-acetaminophen (NORCO)  MG per tablet TAKE 1 TABLET BY MOUTH EVERY 6 HOURS AS NEEDED FOR SEVERE PAIN 30 tablet 0     ibuprofen 200 MG capsule Take 200 mg by mouth every 4 hours as needed for fever       insulin pen needle (31G X 5 MM) 31G X 5 MM miscellaneous Use Saxenda pen needles daily or as directed. 100 each 1     liraglutide - Weight Management (SAXENDA) 18 MG/3ML pen 3 mg daily 15 mL 1     LORazepam (ATIVAN) 0.5 MG tablet TAKE ONE TO TWO TABLETS (0.5-1MG) BY MOUTH EVERY 8 HOURS AS NEEDED FOR ANXIETY 20 tablet 1     meloxicam (MOBIC) 7.5 MG tablet Take 1 tablet (7.5 mg) by mouth daily 90 tablet 3     methocarbamol (ROBAXIN) 750 MG tablet TAKE ONE TO ONE AND ONE-HALF TABLETS BY MOUTH THREE TIMES A DAY AS NEEDED FOR MUSCLE SPASMS 45 tablet 1     MULTIVITAMINS OR 1 daily        omeprazole (PRILOSEC) 20 MG DR capsule Take 1 capsule (20 mg) by mouth daily 90 capsule 4     oxybutynin ER (DITROPAN XL) 5 MG 24 hr tablet Take 1 tablet (5 mg) by mouth daily 90 tablet 0     oxybutynin ER (DITROPAN-XL) 10 MG 24 hr tablet Take 1 tablet (10 mg) by mouth daily 90 tablet 4     Vitamin D3 (CHOLECALCIFEROL) 125 MCG (5000 UT) tablet 6000 UNITs         Weight Loss Medication History Reviewed With Patient 1/9/2023   Which weight loss medications are you currently taking on a regular basis?  Saxenda (liraglutide)   Are you having any side effects from the weight loss medication that we have prescribed you? Yes   If you are having side effects please describe: Very dry mouth           PHYSICAL EXAM:  Objective     Ht 1.702 m (5' 7\")   Wt 105.7 kg (233 lb)   LMP 03/06/2011   BMI 36.49 kg/m             Vitals:  No vitals were obtained today due to virtual visit.    GENERAL: Healthy, alert and no distress  EYES: Eyes grossly normal to inspection.  No discharge or erythema, or obvious scleral/conjunctival abnormalities.  RESP: No audible " wheeze, cough, or visible cyanosis.  No visible retractions or increased work of breathing.    SKIN: Visible skin clear. No significant rash, abnormal pigmentation or lesions.  NEURO: Cranial nerves grossly intact.  Mentation and speech appropriate for age.  PSYCH: Mentation appears normal, affect normal/bright, judgement and insight intact, normal speech and appearance well-groomed.        Sincerely,    MYRA GOLD PA-C      30 minutes spent on the date of the encounter doing chart review, history and exam, documentation and further activities per the note

## 2023-01-12 NOTE — PATIENT INSTRUCTIONS
"Thank you for allowing us the privilege of caring for you. We hope we provided you with the excellent service you deserve.   Please let us know if there is anything else we can do for you so that we can be sure you are completely satisfied with your care experience.    To ensure the quality of our services you may be receiving a patient satisfaction survey from an independent patient satisfaction monitoring company.    The greatest compliment you can give is a \"Likely to Recommend\"    Your visit was with MYRA GOLD PA-C today.    Instructions per today's visit:     Cole Arredondo, it was great to visit with you today.  Here is a review of our visit.  If our clinic scheduler is not able to reach you please call [phone] to schedule your next appointments.    1. Continue Saxenda 3.0mg daily. Refilled sent. Can consider Wegovy in the future if needed.   2. Follow up with Myra Mathias in 1 month   3. Will check Vitamin D lab next month      Information about Video Visits with [employer]: video visit information  _________________________________________________________________________________________________________________________________________________________  If you are asked by your clinic team to have your blood pressure checked:  [facility] do offer several locations for blood pressure checks. Please follow the below link to schedule an appointment. Scheduling an appointment at the pharmacy for a blood pressure check is now preferred.    Appointment Plus (appointment-plus.[facility])  _________________________________________________________________________________________________________________________________________________________  Important contact and scheduling information:  Please call our contact center at [phone] to schedule your next appointments.  To find a lab location near you, please call [phone].  For any nursing questions or concerns call Mary Salazar LPN at " [phone] or Miguelina Gan RN at [phone]  Please call during clinic hours Monday through Friday 8:00a - 4:00p if you have questions or you can contact us via Mobak[employer]art at anytime and we will reply during clinic hours.    Lab results will be communicated through My Chart or letter (if My Chart not used). Please call the clinic if you have not received communication after 1 week or if you have any questions.?  Clinic Fax: [phone]    _________________________________________________________________________________________________________________________________________________________  Meal Replacement Products:    Here is the link to our new e-store where you can purchase our meal replacement products    [facility] E-Store  [employer]/store    The one week starter kit is a great way to sample a variety of products and see what works for you.    If you want more information about the product go to: [employer]    If you are an employee or [facility] or [facility] please contact your care team for a 10% estore discount    Free Shipping for orders over $75     Benefits of meal replacements products:    Portion and calorie control  Improved nutrition  Structured eating  Simplified food choices  Avoid contact with trigger foods  _________________________________________________________________________________________________________________________________________________________  Interested in working with a health ?  Health coaches work with you to improve your overall health and wellbeing.  They look at the whole person, and may involve discussion of different areas of life, including, but not limited to the four pillars of health (sleep, exercise, nutrition, and stress management). Discuss with your care team if you would like to start working a health .  Health Coaching-3 Pack: Schedule by calling [phone]    $99 for three  health coaching visits    Visits may be done in person or via phone    Coaching is a partnership between the  and the client; Coaches do not prescribe or diagnose    Coaching helps inspire the client to reach his/her personal goals   _________________________________________________________________________________________________________________________________________________________  24 Week Healthy Lifestyle Plan:    Our mission in the 24-week Healthy Lifestyle Plan is to provide you with individualized care by giving you the tools, education and support you need to lose weight and maintain a healthy lifestyle. In your 24-week journey, you ll be supported by a dedicated weight loss team that includes registered dietitians, medical weight management providers, health coaches, and nurses -- all with special expertise in weight loss -- to help you every step of the way.     Monthly meetings with your registered dietician or medical weight management provider help to review your progress, update your care plan, and make any adjustments needed to ensure success. Between these visits, weekly and bi-weekly health  visits will help you focus on the four pillars of weight loss -- stress, sleep, nutrition, and exercise -- and how you can best adapt each to achieve sustainable weight loss results.    In addition, you will be given exclusive access to online wellbeing classes through [employer].  Your initial visit will be with a medical weight management provider who will help to understand your weight loss goals and ensure this program is the right fit for you. Please let our team know if you are interested in the 24 week plan by sending a message to your care team or calling [phone] to schedule.  _________________________________________________________________________________________________________________________________________________________    COMPREHENSIVE WEIGHT MANAGEMENT PROGRAM  VIRTUAL SUPPORT  "GROUPS    For Support Group Information:      We offer support groups for patients who are working on weight loss and considering, preparing for or have had weight loss surgery.   There is no cost for this opportunity.  You are invited to attend the?Virtual Support Groups?provided by any of the following locations:    [facility] via Microsoft Teams with Cynthia Larsen RN  2.   [place] via Xenome with Otto Kirk, PhD, LP  3.   [place] via Xenome with Cecilia Stephenson RN  4.   [facility] via A [place] Teams with Cecilia De La Rosa [facility]    The following Support Group information can also be found on our website:  [email or web address]    [facility] Weight Loss Surgery Support Group    [facility] Weight Loss Surgery Support Group  The support group is a patient-lead forum that meets monthly to share experiences, encouragement and education. It is open to those who have had weight loss surgery, are scheduled for surgery, and those who are considering surgery.   WHEN: This group meets on the 3rd Wednesday of each month from 5:00PM - 6:00PM virtually using Microsoft Teams.   FACILITATOR: Led by Cynthia Larsen, MADDISON, PHILLIP, RN, the program's Clinical Coordinator.   TO REGISTER: Please contact the clinic via Vycon or call the nurse line directly at [phone] to inform our staff that you would like an invite sent to you and to let us know the email you would like the invite sent to. Prior to the meeting, a link with directions on how to join the meeting will be sent to you.    2022 Meetings  Flower 15: \"Let's Talk\" a time for the group to share.  July 20: \"Let's Talk\" a time for the group to share.  August 17: \"Let's Talk\" a time for the group to share.  September 21: \"Let's Talk\" a time for the group to share.  October 19: Guest Speaker: Dr Paolo Harrell MD Pulmonologist and Sleep Medicine Physician, \"Getting a Good Night's " "Sleep\".  November 16: \"Let's Talk\" a time for the group to share.  December 21: \"Let's Talk\" a time for the group to share.    [facility] Support Groups    Connections: Bariatric Care Support Group?  This is open to all [facility] (and those external to this program) pre- and post- operative bariatric surgery patients as well as their support system.   WHEN: This group meets the 2nd Tuesday of each month from 6:30 PM - 8:00 PM virtually using Microsoft Teams.   FACILITATOR: Led by Otto Kirk, Ph.D who is a Licensed Psychologist with the [facility] Comprehensive Weight Management Program.   TO REGISTER: Please send an email to Otto Kirk, Ph.D.,  at?[email or web address]?if you would like an invitation to the group and to learn about using Microsoft Teams.    2022 Meetings  June 14: Triny Johnson, MADDISON, LD at [facility], \"Nutritional Labeling\"  July 12 August 2 (Please Note Date Change)  September 13 October 11 November 8 December 13    Connections: Post-Operative Bariatric Surgery Support Group  This is a support group for [facility] bariatric patients (and those external to [facility]) who have had bariatric surgery and are at least 3 months post-surgery.  WHEN: This support group meets the 4th Wednesday of the month from 11:00 AM - 12:00 PM virtually using Microsoft Teams.   FACILITATOR: Led by Certified Bariatric Nurse, Cecilia Stephenson RN.   TO REGISTER: Please send an email to Cecilia at [email or web address] if you would like an invitation to the group and to learn about using Microsoft Teams.    2022 Meetings June 22 July 27 August 24 September 28 October 26 November 23 December 28      [facility] Healthy Lifestyle Virtual Support Group    Healthy Lifestyle Virtual Support Group?  This is 60 minutes of small group guided discussion, support and resources. All are " "welcome who want a healthy lifestyle.  WHEN: This group meets monthly on a Friday from 12:30 PM - 1:30 PM virtually using Microsoft Teams.   FACILITATOR: Led by National Board Certified Health and , Cecilia De La Rosa [facility].   TO REGISTER: Please send an email to Cecilia [email or web address] to receive monthly invites to the group or if you have any questions about having a health .  Prior to the meeting, a link with directions on how to join the meeting will be sent to you.    2022 Meetings  June 24: Cecilia De La Rosa [facility], \"Setting Limits and Boundaries\".  Jul 29: Open Forum  August 26: Guest Speaker: Deborah Black Registered Dietitian  September 30: Open Forum  October 28th: Guest Speaker: Elizabeth Hwang [facility], Health , \"Gratitude Practices\".  November 18: Guest Speaker: Aliya Ruff RD Registered Dietitian, \"Navigating How to Eat around the Holidays\".  December 16: Guest Speaker: Emilia Villegas [facility], \"Changing Your Relationship with Movement\".    ____________________________________________________________________________________________________________________________________________________________________________  [facility] Get Moving Program  Our team of physical therapists is trained to help you understand and take control of your condition. They will perform a thorough evaluation to determine your ability for activity and develop a customized plan to fit your goals and physical ability.  Scheduling: Unsure if the Get Moving program is right for you? Discuss the program with your medical provider or diabetes educator. You can also call us at [phone] to ask questions or schedule an appointment.   [facility] Get Moving Program  ____________________________________________________________________________________________________________________________________________________________________________  [facility] Diabetes Prevention Program (DPP)  If you have " prediabetes and Medicare please contact us via [employer] to learn more about the Diabetes Prevention Program (DPP)  Program Details:  [facility] offers the year-long Diabetes Prevention Program (DPP). The program helps you to make lifestyle changes that prevent or delay type 2 diabetes by supporting healthy eating, increased physical activity, stress reduction and use of coping skills.   On average, previous [facility] DPP cohorts have lost and maintained at least 5% of their starting weight throughout the program and averaged more than 150 minutes of physical activity per week.  Participants meet weekly for one-hour group sessions over sixteen weeks, every other week for the next 8 weeks, and monthly for the last six months.   A year-long maintenance program is also available for participants who complete the first year.   Location & Cost:   During the COVID-19 Public Health Emergency, the program is offered virtually. When in-person classes can resume, they will be held at [facility].  For people with Medicare, the program is covered in full. A self-pay option will also be available for those with non-Medicare insurance plans.   _________________________________________________________________________________________________________________________________________________________  Bluetooth Scale:    We hope to provide you with high quality virtual healthcare visits while social distancing for COVID-19 is necessary, as well as in the future when virtual visits may be more convenient for you.     Our technology team made it possible for Bluetooth scales to send weight measurements to our electronic medical record. This allows weights from you weighing at home to securely flow into the medical record, which will improve telephone and virtual visits.   Additionally, studies have shown that adults actually lose more weight when their weights are automatically sent to  someone else, and also that this process is not stressful for those adults.    Below is a link for purchasing the scale, with a discount code for our patients. You may call your insurance company to see if they will reimburse you for the cost of the scale, as a piece of durable medical equipment. The scales only go up to a weight of 400 pounds. This is an issue and we are working with the developer on increasing this. We found no scales that go over 400lb that have blue-tooth for connecting to [place].    Scale to purchase: the hike  Body  Scale: [email or web address]/us/en/body/shop?[email or web address]=aw.ds    Discount Code: We have a discount code for our patients to bring the cost down to $50, Discount code is: [place]_Scale_20%off  _______________________________________________________________________________________________________________________________________________________________________________    To work with a Behavioral Health Psychologist:    Call to schedule:    Cristofer Snow - [phone]  Elias Sherman - [phone]  Olga Mcgee - [phone]  Sharon Collins - [phone]   Indu Sullivan PhD (cannot accept Medicare) [phone]        Thank you,   [facility] Comprehensive Weight Management Team

## 2023-01-12 NOTE — ASSESSMENT & PLAN NOTE
Has ramped up on Saxenda to 3.0mg without concern. Tolerating well. Has been helpful with decrease in hunger and thoughts of food. Would like to continue with Saxenda at this point. Will discuss possible to transition to Wegovy in future if needed.

## 2023-01-25 ENCOUNTER — TELEPHONE (OUTPATIENT)
Dept: ENDOCRINOLOGY | Facility: CLINIC | Age: 63
End: 2023-01-25
Payer: COMMERCIAL

## 2023-01-25 ENCOUNTER — TELEPHONE (OUTPATIENT)
Dept: OBGYN | Facility: CLINIC | Age: 63
End: 2023-01-25
Payer: COMMERCIAL

## 2023-01-25 DIAGNOSIS — E66.812 CLASS 2 SEVERE OBESITY WITH SERIOUS COMORBIDITY AND BODY MASS INDEX (BMI) OF 37.0 TO 37.9 IN ADULT, UNSPECIFIED OBESITY TYPE (H): Primary | ICD-10-CM

## 2023-01-25 DIAGNOSIS — E66.01 CLASS 2 SEVERE OBESITY WITH SERIOUS COMORBIDITY AND BODY MASS INDEX (BMI) OF 37.0 TO 37.9 IN ADULT, UNSPECIFIED OBESITY TYPE (H): Primary | ICD-10-CM

## 2023-01-25 RX ORDER — SEMAGLUTIDE 1.7 MG/.75ML
1.7 INJECTION, SOLUTION SUBCUTANEOUS WEEKLY
Qty: 3 ML | Refills: 1 | Status: SHIPPED | OUTPATIENT
Start: 2023-01-25 | End: 2023-04-13 | Stop reason: DRUGHIGH

## 2023-01-25 NOTE — TELEPHONE ENCOUNTER
Panel Management Review    Health Maintenance List    Health Maintenance   Topic Date Due     LUNG CANCER SCREENING  Never done     COLORECTAL CANCER SCREENING  10/22/2022     LEEP  Never done     HPV TEST  04/28/2023     PAP  04/28/2023     MARS ASSESSMENT  02/14/2023     YEARLY PREVENTIVE VISIT  04/28/2023     URINE DRUG SCREEN  04/28/2023     PHQ-9  05/04/2023     DTAP/TDAP/TD IMMUNIZATION (4 - Td or Tdap) 01/13/2024     MAMMO SCREENING  04/04/2024     ADVANCE CARE PLANNING  03/04/2025     LIPID  04/28/2027     HEPATITIS C SCREENING  Completed     DEPRESSION ACTION PLAN  Completed     INFLUENZA VACCINE  Completed     ZOSTER IMMUNIZATION  Completed     COVID-19 Vaccine  Completed     Pneumococcal Vaccine: Pediatrics (0 to 5 Years) and At-Risk Patients (6 to 64 Years)  Aged Out     IPV IMMUNIZATION  Aged Out     MENINGITIS IMMUNIZATION  Aged Out     HIV SCREENING  Discontinued     PAP FOLLOW-UP  Discontinued     HPV FOLLOW-UP  Discontinued       Composite cancer screening  Chart review shows that this patient is due/due soon for the following Colonoscopy  Lab Results   Component Value Date    PAP NIL 04/23/2021     Past Surgical History:   Procedure Laterality Date     COLONOSCOPY  10/22/2012    Procedure: COLONOSCOPY;  Colonoscopy;  Surgeon: Robert Encinas MD;  Location: [facility]     CONIZATION LEEP  1983     ENT SURGERY       LAPAROSCOPIC CHOLECYSTECTOMY  10/21/2010    LAPAROSCOPIC CHOLECYSTECTOMY performed by DAMIR WING at [facility]     SURGICAL HISTORY OF -   1973    Rt wrist surgery     SURGICAL HISTORY OF -   2004    breast augmentation     SURGICAL HISTORY OF -   10/15/2010    EUS - GB bulding w/sludge, Nl bile duct w/no choledocholithiasis or sludge.      SURGICAL HISTORY OF -   02/09/2006    1.  Left shoulder arthroscopy with arthroscopic subacromial decompression.      TONSILLECTOMY & ADENOIDECTOMY  as child    tonsils and adenoids       Is hysterectomy listed in surgical history? No   Is mastectomy  listed in surgical history? No     Summary:    Patient is due/failing the following:   Colonoscopy    Action needed: Patient needs office visit for colon cancer screening.    Type of outreach:  Sent mokono message.      Staff Signature:  SIMÓN SCHWARTZ MA

## 2023-01-25 NOTE — LETTER
2023      Joana Arredondo  [address] [place] [address]              Dear Joana,    To ensure we are providing the best quality care, we have reviewed your chart and see that you are due for:    Colon Cancer Screening:    If you have received a referral to schedule a Colonoscopy or choose to do a Colonoscopy instead of the FIT/ Cologuard test, please call [phone] to schedule.    If you have received a FIT test kit from a prior office visit, please check the expiration date. If , please get a new kit from the Lab/ Clinic. If not , please complete the test and mail in as soon as you are able.    If you would prefer to complete a Cologuard test, please reach out to your provider to see if this is an option for you.    You may call Dept: [phone] if you have any questions. If you have completed the tests outside of [facility], please have the results forwarded to our office Fax # [phone]. We will update the chart for your primary Physician to review before your next annual physical.          Sincerely,      Johny Moser MD

## 2023-01-25 NOTE — TELEPHONE ENCOUNTER
Prior authorization is needed. Please start PA.      [facility] [employer]     RX BIN: [id number]  RX PCN: [id number]   RX ID: [id number]  RX GRP:[id number]      Thank you,    Kimo Moscoso [facility]  Pharmacy Clinic [facility]  [email or web address]   Phone: [phone]  Fax: [phone]

## 2023-01-27 NOTE — TELEPHONE ENCOUNTER
Prior Authorization Approval    Authorization Effective Date: 1/27/2023  Authorization Expiration Date: 8/27/2023  Medication: Semaglutide-Weight Management (WEGOVY) 1.7 MG/0.75ML SOAJ--APPROVED  Approved Dose/Quantity:   Reference #:     Insurance Company: [employer] - Phone [phone] Fax [phone]  Expected CoPay:       CoPay Card Available:      Foundation Assistance Needed:    Which Pharmacy is filling the prescription (Not needed for infusion/clinic administered): [facility] [place], [place] - [address]  Pharmacy Notified: Yes  Patient Notified: Yes **Instructed pharmacy to notify patient when script is ready to /ship.**

## 2023-01-27 NOTE — TELEPHONE ENCOUNTER
PA Initiation    Medication: Semaglutide-Weight Management (WEGOVY) 1.7 MG/0.75ML SOAJ   Insurance Company: [employer] - Phone [phone] Fax [phone]  Pharmacy Filling the Rx: [place], [place] - [address]  Filling Pharmacy Phone: [phone]  Filling Pharmacy Fax: [phone]  Start Date: 1/27/2023

## 2023-02-02 ENCOUNTER — MYC MEDICAL ADVICE (OUTPATIENT)
Dept: FAMILY MEDICINE | Facility: CLINIC | Age: 63
End: 2023-02-02
Payer: COMMERCIAL

## 2023-02-02 DIAGNOSIS — N39.3 URINARY, INCONTINENCE, STRESS FEMALE: ICD-10-CM

## 2023-02-02 RX ORDER — OXYBUTYNIN CHLORIDE 15 MG/1
15 TABLET, EXTENDED RELEASE ORAL DAILY
Qty: 90 TABLET | Refills: 4 | Status: SHIPPED | OUTPATIENT
Start: 2023-02-02 | End: 2023-10-06

## 2023-02-08 NOTE — TELEPHONE ENCOUNTER
Patient due for LEEP.    Reminder done today via telephone call.    Pt notified, transferred to scheduling

## 2023-02-14 NOTE — PROGRESS NOTES
Virtual Visit Check-In    During this virtual visit the patient is located in [place], patient verifies this as the location during the entirety of this visit.     Charlotte Montero is a 62 year old who is being evaluated via a billable video visit.      How would you like to obtain your AVS? MyChart  If the video visit is dropped, the invitation should be resent by: Text to cell phone: [phone]  Will anyone else be joining your video visit? No        Video-Visit Details    Type of service:  Video Visit     Originating Location (pt. Location): Home    Distant Location (provider location):  Off-site  Platform used for Video Visit: [employer]      Video Start Time: 7:31AM  Video End Time:7:53AM      Return Medical Weight Management Note     Joana Arredondo  MRN:  [id number]  :  1960  JUSTICE:  2/15/2023    Dear Marybel Aguiar MD,    I had the pleasure of seeing your patient Joana Arredondo. She is a 62 year old female who I am continuing to see for treatment of obesity related to:       2022   I have the following health issues associated with obesity: Pre-Diabetes, High Cholesterol, Sleep Apnea, Stress Incontinence   I have the following symptoms associated with obesity: Knee Pain, Depression, Back Pain, Fatigue       Assessment & Plan   Problem List Items Addressed This Visit    None  Visit Diagnoses     Class 2 severe obesity with serious comorbidity and body mass index (BMI) of 35.0 to 35.9 in adult, unspecified obesity type (H)    -  Primary    Pre-diabetes        Relevant Orders    Hemoglobin A1c         1. Continue Vitamin D 6000IU daily  2. Labs to be collected prior to next visit - CMP, vitamin D, PTH, A1C  \  3. Continue Wegovy 1.7mg once weekly for at least 3 more weeks. She will reach out first week of march about where to send prescription and if dose increase is needed.   4. Follow up with Grisel Mathias in 6 weeks     INTERVAL HISTORY:  New MWM - 2022  Started Monty  Last seen 2023 -  wanted to continue Saxenda 3.0mg once daily.   Patient reached out via [employer] 1/25/2023 that Saxenda was no longer helpful with hunger, and would like to transition to Wegovy   Transitioned to Wegovy 1.7mg once weekly       Has lost 15lbs total since first visit. Has lost 5lbs since last visit.     Going away for a month to [place] to visit her mom. Will drive there. Brining her dog.       Anti-obesity medications:     Current:   Wegovy 1.7mg - Has had 1 dose. 2nd dose due today. Has had no side effects transitioning. Is unsure if is more helpful then Saxenda. Is helpful with appetite and hunger       Recent diet changes: Still having problems with night time snacking. Trying to pick healthy options - fruit. Decrease in portion sizes    Recent exercise/activity changes: Hoping to increase exercise when she goes to [place] for the next month. Concerned with slipping on ice during the winter.     Recent stressors: Has been busy with work, but has been better.     Recent sleep changes: Stable     Vitamins/Labs: Vitamin D 6000IU. Labs are due    CURRENT WEIGHT:   227 lbs 0 oz    Initial Weight (lbs): 242 lbs  Last Visits Weight: 105.7 kg (233 lb)  Cumulative weight loss (lbs): 15  Weight Loss Percentage: 6.2%    Changes and Difficulties 2/14/2023   I have made the following changes to my diet since my last visit: About the same as last month. Eating smaller amounts, healthier foods.   With regards to my diet, I am still struggling with: -   I have made the following changes to my activity/exercise since my last visit: No changes to activity level yet.   With regards to my activity/exercise, I am still struggling with: -         MEDICATIONS:   Current Outpatient Medications   Medication Sig Dispense Refill     acetaminophen (TYLENOL) 650 MG CR tablet Take 1,300 mg by mouth every 8 hours as needed        CALCIUM + D OR 1 TABLET DAILY       FLUoxetine (PROZAC) 40 MG capsule Take 1 capsule (40 mg) by mouth daily 90  "capsule 4     HYDROcodone-acetaminophen (NORCO)  MG per tablet TAKE 1 TABLET BY MOUTH EVERY 6 HOURS AS NEEDED FOR SEVERE PAIN 30 tablet 0     ibuprofen 200 MG capsule Take 200 mg by mouth every 4 hours as needed for fever       insulin pen needle (31G X 5 MM) 31G X 5 MM miscellaneous Use Saxenda pen needles daily or as directed. 100 each 1     liraglutide - Weight Management (SAXENDA) 18 MG/3ML pen 3 mg daily 15 mL 1     LORazepam (ATIVAN) 0.5 MG tablet TAKE ONE TO TWO TABLETS (0.5-1MG) BY MOUTH EVERY 8 HOURS AS NEEDED FOR ANXIETY 20 tablet 1     meloxicam (MOBIC) 7.5 MG tablet Take 1 tablet (7.5 mg) by mouth daily 90 tablet 3     methocarbamol (ROBAXIN) 750 MG tablet TAKE ONE TO ONE AND ONE-HALF TABLETS BY MOUTH THREE TIMES A DAY AS NEEDED FOR MUSCLE SPASMS 45 tablet 1     MULTIVITAMINS OR 1 daily        omeprazole (PRILOSEC) 20 MG DR capsule Take 1 capsule (20 mg) by mouth daily 90 capsule 4     oxybutynin ER (DITROPAN XL) 15 MG 24 hr tablet Take 1 tablet (15 mg) by mouth daily 90 tablet 4     Semaglutide-Weight Management (WEGOVY) 1.7 MG/0.75ML SOAJ Inject 1.7 mg Subcutaneous once a week 3 mL 1     Vitamin D3 (CHOLECALCIFEROL) 125 MCG (5000 UT) tablet 6000 UNITs         Weight Loss Medication History Reviewed With Patient 2/14/2023   Which weight loss medications are you currently taking on a regular basis?  Wegovy   Are you having any side effects from the weight loss medication that we have prescribed you? No   If you are having side effects please describe: -           Objective    Ht 1.702 m (5' 7\")   Wt 103 kg (227 lb)   LMP 03/06/2011   BMI 35.55 kg/m             Vitals:  No vitals were obtained today due to virtual visit.    PHYSICAL EXAM:  GENERAL: Healthy, alert and no distress  EYES: Eyes grossly normal to inspection.  No discharge or erythema, or obvious scleral/conjunctival abnormalities.  RESP: No audible wheeze, cough, or visible cyanosis.  No visible retractions or increased work of " breathing.    SKIN: Visible skin clear. No significant rash, abnormal pigmentation or lesions.  NEURO: Cranial nerves grossly intact.  Mentation and speech appropriate for age.  PSYCH: Mentation appears normal, affect normal/bright, judgement and insight intact, normal speech and appearance well-groomed.        Sincerely,    MYRA GOLD PA-C      30 minutes spent on the date of the encounter doing chart review, history and exam, documentation and further activities per the note

## 2023-02-14 NOTE — NURSING NOTE
Chief Complaint   Patient presents with     Follow Up     Return weight management.         Vitals:    02/14/23 1603   Weight: 229 lb       Body mass index is 35.87 kg/m .      Yadira Vasquez, EMT  [facility]

## 2023-02-15 ENCOUNTER — VIRTUAL VISIT (OUTPATIENT)
Dept: ENDOCRINOLOGY | Facility: CLINIC | Age: 63
End: 2023-02-15
Payer: COMMERCIAL

## 2023-02-15 VITALS — BODY MASS INDEX: 35.63 KG/M2 | HEIGHT: 67 IN | WEIGHT: 227 LBS

## 2023-02-15 DIAGNOSIS — E66.01 CLASS 2 SEVERE OBESITY WITH SERIOUS COMORBIDITY AND BODY MASS INDEX (BMI) OF 35.0 TO 35.9 IN ADULT, UNSPECIFIED OBESITY TYPE (H): Primary | ICD-10-CM

## 2023-02-15 DIAGNOSIS — R73.03 PRE-DIABETES: ICD-10-CM

## 2023-02-15 DIAGNOSIS — E66.812 CLASS 2 SEVERE OBESITY WITH SERIOUS COMORBIDITY AND BODY MASS INDEX (BMI) OF 35.0 TO 35.9 IN ADULT, UNSPECIFIED OBESITY TYPE (H): Primary | ICD-10-CM

## 2023-02-15 PROCEDURE — 99443 PR PHYSICIAN TELEPHONE EVALUATION 21-30 MIN: CPT | Mod: VID

## 2023-02-15 NOTE — LETTER
2/15/2023       RE: Joana Arredondo  [address] [place] [address]     Dear Colleague,    Thank you for referring your patient, Joana Arredondo, to the [facility]. Please see a copy of my visit note below.    Virtual Visit Check-In    During this virtual visit the patient is located in [place], patient verifies this as the location during the entirety of this visit.     Charlotte Montero is a 62 year old who is being evaluated via a billable video visit.      How would you like to obtain your AVS? MyChart  If the video visit is dropped, the invitation should be resent by: Text to cell phone: [phone]  Will anyone else be joining your video visit? No        Video-Visit Details    Type of service:  Video Visit     Originating Location (pt. Location): Home    Distant Location (provider location):  Off-site  Platform used for Video Visit: [employer]      Video Start Time: 7:31AM  Video End Time:7:53AM      Return Medical Weight Management Note     Joana Arredondo  MRN:  [id number]  :  1960  JUSTICE:  2/15/2023    Dear Marybel Aguiar MD,    I had the pleasure of seeing your patient Joana Arredondo. She is a 62 year old female who I am continuing to see for treatment of obesity related to:       2022   I have the following health issues associated with obesity: Pre-Diabetes, High Cholesterol, Sleep Apnea, Stress Incontinence   I have the following symptoms associated with obesity: Knee Pain, Depression, Back Pain, Fatigue       Assessment & Plan   Problem List Items Addressed This Visit    None  Visit Diagnoses     Class 2 severe obesity with serious comorbidity and body mass index (BMI) of 35.0 to 35.9 in adult, unspecified obesity type (H)    -  Primary    Pre-diabetes        Relevant Orders    Hemoglobin A1c         1. Continue Vitamin D 6000IU daily  2. Labs to be collected prior to  next visit - CMP, vitamin D, PTH, A1C  \  3. Continue Wegovy 1.7mg once weekly for at least 3 more weeks. She will reach out first week of march about where to send prescription and if dose increase is needed.   4. Follow up with Meena in 6 weeks     INTERVAL HISTORY:  New MWM - 12/6/2022  Started Saxenda  Last seen 1/11/2023 - wanted to continue Saxenda 3.0mg once daily.   Patient reached out via [employer] 1/25/2023 that Saxenda was no longer helpful with hunger, and would like to transition to Wegovy   Transitioned to Wegovy 1.7mg once weekly       Has lost 15lbs total since first visit. Has lost 5lbs since last visit.     Going away for a month to [place] to visit her mom. Will drive there. Brining her dog.       Anti-obesity medications:     Current:   Wegovy 1.7mg - Has had 1 dose. 2nd dose due today. Has had no side effects transitioning. Is unsure if is more helpful then Saxenda. Is helpful with appetite and hunger       Recent diet changes: Still having problems with night time snacking. Trying to pick healthy options - fruit. Decrease in portion sizes    Recent exercise/activity changes: Hoping to increase exercise when she goes to [place] for the next month. Concerned with slipping on ice during the winter.     Recent stressors: Has been busy with work, but has been better.     Recent sleep changes: Stable     Vitamins/Labs: Vitamin D 6000IU. Labs are due    CURRENT WEIGHT:   227 lbs 0 oz    Initial Weight (lbs): 242 lbs  Last Visits Weight: 105.7 kg (233 lb)  Cumulative weight loss (lbs): 15  Weight Loss Percentage: 6.2%    Changes and Difficulties 2/14/2023   I have made the following changes to my diet since my last visit: About the same as last month. Eating smaller amounts, healthier foods.   With regards to my diet, I am still struggling with: -   I have made the following changes to my activity/exercise since my last visit: No changes to activity level yet.   With regards to my activity/exercise,  "I am still struggling with: -         MEDICATIONS:   Current Outpatient Medications   Medication Sig Dispense Refill     acetaminophen (TYLENOL) 650 MG CR tablet Take 1,300 mg by mouth every 8 hours as needed        CALCIUM + D OR 1 TABLET DAILY       FLUoxetine (PROZAC) 40 MG capsule Take 1 capsule (40 mg) by mouth daily 90 capsule 4     HYDROcodone-acetaminophen (NORCO)  MG per tablet TAKE 1 TABLET BY MOUTH EVERY 6 HOURS AS NEEDED FOR SEVERE PAIN 30 tablet 0     ibuprofen 200 MG capsule Take 200 mg by mouth every 4 hours as needed for fever       insulin pen needle (31G X 5 MM) 31G X 5 MM miscellaneous Use Saxenda pen needles daily or as directed. 100 each 1     liraglutide - Weight Management (SAXENDA) 18 MG/3ML pen 3 mg daily 15 mL 1     LORazepam (ATIVAN) 0.5 MG tablet TAKE ONE TO TWO TABLETS (0.5-1MG) BY MOUTH EVERY 8 HOURS AS NEEDED FOR ANXIETY 20 tablet 1     meloxicam (MOBIC) 7.5 MG tablet Take 1 tablet (7.5 mg) by mouth daily 90 tablet 3     methocarbamol (ROBAXIN) 750 MG tablet TAKE ONE TO ONE AND ONE-HALF TABLETS BY MOUTH THREE TIMES A DAY AS NEEDED FOR MUSCLE SPASMS 45 tablet 1     MULTIVITAMINS OR 1 daily        omeprazole (PRILOSEC) 20 MG DR capsule Take 1 capsule (20 mg) by mouth daily 90 capsule 4     oxybutynin ER (DITROPAN XL) 15 MG 24 hr tablet Take 1 tablet (15 mg) by mouth daily 90 tablet 4     Semaglutide-Weight Management (WEGOVY) 1.7 MG/0.75ML SOAJ Inject 1.7 mg Subcutaneous once a week 3 mL 1     Vitamin D3 (CHOLECALCIFEROL) 125 MCG (5000 UT) tablet 6000 UNITs         Weight Loss Medication History Reviewed With Patient 2/14/2023   Which weight loss medications are you currently taking on a regular basis?  Wegovy   Are you having any side effects from the weight loss medication that we have prescribed you? No   If you are having side effects please describe: -           Objective     Ht 1.702 m (5' 7\")   Wt 103 kg (227 lb)   LMP 03/06/2011   BMI 35.55 kg/m             Vitals:  No " vitals were obtained today due to virtual visit.    PHYSICAL EXAM:  GENERAL: Healthy, alert and no distress  EYES: Eyes grossly normal to inspection.  No discharge or erythema, or obvious scleral/conjunctival abnormalities.  RESP: No audible wheeze, cough, or visible cyanosis.  No visible retractions or increased work of breathing.    SKIN: Visible skin clear. No significant rash, abnormal pigmentation or lesions.  NEURO: Cranial nerves grossly intact.  Mentation and speech appropriate for age.  PSYCH: Mentation appears normal, affect normal/bright, judgement and insight intact, normal speech and appearance well-groomed.        Sincerely,    MYRA GOLD PA-C      30 minutes spent on the date of the encounter doing chart review, history and exam, documentation and further activities per the note

## 2023-02-16 NOTE — PATIENT INSTRUCTIONS
"Thank you for allowing us the privilege of caring for you. We hope we provided you with the excellent service you deserve.   Please let us know if there is anything else we can do for you so that we can be sure you are completely satisfied with your care experience.    To ensure the quality of our services you may be receiving a patient satisfaction survey from an independent patient satisfaction monitoring company.    The greatest compliment you can give is a \"Likely to Recommend\"    Your visit was with MYRA BARBARA ELLIOTT GOLD today.    Instructions per today's visit:     Cole Arredondo, it was great to visit with you today.  Here is a review of our visit.  If our clinic scheduler is not able to reach you please call [phone] to schedule your next appointments.    1. Continue Vitamin D 6000IU daily  2. Labs to be collected prior to next visit - CMP, vitamin D, PTH, A1C  \  3. Continue Wegovy 1.7mg once weekly for at least 3 more weeks. She will reach out first week of march about where to send prescription and if dose increase is needed.   4. Follow up with Myra Mathias in 6 weeks       Information about Video Visits with [employer]: video visit information  _________________________________________________________________________________________________________________________________________________________  If you are asked by your clinic team to have your blood pressure checked:  [facility] do offer several locations for blood pressure checks. Please follow the below link to schedule an appointment. Scheduling an appointment at the pharmacy for a blood pressure check is now preferred.    Appointment Plus (appointment-plus.[employer])  _________________________________________________________________________________________________________________________________________________________  Important contact and scheduling information:  Please call our contact center at [phone] to schedule your next " appointments.  To find a lab location near you, please call [phone].  For any nursing questions or concerns call Mary Salazar LPN at [phone] or Miguelina Gan RN at [phone]  Please call during clinic hours Monday through Friday 8:00a - 4:00p if you have questions or you can contact us via Snoobehart at anytime and we will reply during clinic hours.    Lab results will be communicated through My Chart or letter (if My Chart not used). Please call the clinic if you have not received communication after 1 week or if you have any questions.?  Clinic Fax: [phone]    _________________________________________________________________________________________________________________________________________________________  Meal Replacement Products:    Here is the link to our new e-store where you can purchase our meal replacement products     [facility] E-Store  [email or web address]    The one week starter kit is a great way to sample a variety of products and see what works for you.    If you want more information about the product go to: Fresh [employer].Bang[employer]    If you are an employee or [facility] or  [place] please contact your care team for a 10% estore discount    Free Shipping for orders over $75     Benefits of meal replacements products:    Portion and calorie control  Improved nutrition  Structured eating  Simplified food choices  Avoid contact with trigger foods  _________________________________________________________________________________________________________________________________________________________  Interested in working with a health ?  Health coaches work with you to improve your overall health and wellbeing.  They look at the whole person, and may involve discussion of different areas of life, including, but not limited to the four pillars of health (sleep, exercise, nutrition, and stress management). Discuss with  your care team if you would like to start working a health .  Health Coaching-3 Pack: Schedule by calling [phone]    $99 for three health coaching visits    Visits may be done in person or via phone    Coaching is a partnership between the  and the client; Coaches do not prescribe or diagnose    Coaching helps inspire the client to reach his/her personal goals   _________________________________________________________________________________________________________________________________________________________  24 Week Healthy Lifestyle Plan:    Our mission in the 24-week Healthy Lifestyle Plan is to provide you with individualized care by giving you the tools, education and support you need to lose weight and maintain a healthy lifestyle. In your 24-week journey, you ll be supported by a dedicated weight loss team that includes registered dietitians, medical weight management providers, health coaches, and nurses -- all with special expertise in weight loss -- to help you every step of the way.     Monthly meetings with your registered dietician or medical weight management provider help to review your progress, update your care plan, and make any adjustments needed to ensure success. Between these visits, weekly and bi-weekly health  visits will help you focus on the four pillars of weight loss -- stress, sleep, nutrition, and exercise -- and how you can best adapt each to achieve sustainable weight loss results.    In addition, you will be given exclusive access to online wellbeing classes through [employer].  Your initial visit will be with a medical weight management provider who will help to understand your weight loss goals and ensure this program is the right fit for you. Please let our team know if you are interested in the 24 week plan by sending a message to your care team or calling [phone] to  "schedule.  _________________________________________________________________________________________________________________________________________________________    COMPREHENSIVE WEIGHT MANAGEMENT PROGRAM  VIRTUAL SUPPORT GROUPS    For Support Group Information:      We offer support groups for patients who are working on weight loss and considering, preparing for or have had weight loss surgery.   There is no cost for this opportunity.  You are invited to attend the?Virtual Support Groups?provided by any of the following locations:    [facility] via Microsoft Teams with Cynthia Larsen RN  2.   [place] via [employer] with Otto Kirk, PhD, LP  3.   [place] via [employer] with Cecilia Stephenson RN  4.   [facility] via Changelight Teams with Cecilia De La Rosa [facility]    The following Support Group information can also be found on our website:  [email or web address]    [facility] Weight Loss Surgery Support Group    [facility] Weight Loss Surgery Support Group  The support group is a patient-lead forum that meets monthly to share experiences, encouragement and education. It is open to those who have had weight loss surgery, are scheduled for surgery, and those who are considering surgery.   WHEN: This group meets on the 3rd Wednesday of each month from 5:00PM - 6:00PM virtually using Microsoft Teams.   FACILITATOR: Led by Cynthia Larsen, MADDISON, LD, RN, the program's Clinical Coordinator.   TO REGISTER: Please contact the clinic via GroupPrice or call the nurse line directly at [phone] to inform our staff that you would like an invite sent to you and to let us know the email you would like the invite sent to. Prior to the meeting, a link with directions on how to join the meeting will be sent to you.    2022 Meetings  Flower 15: \"Let's Talk\" a time for the group to share.  July 20: \"Let's Talk\" a time for the group to " "share.  August 17: \"Let's Talk\" a time for the group to share.  September 21: \"Let's Talk\" a time for the group to share.  October 19: Guest Speaker: Dr Paolo Harrell MD Pulmonologist and Sleep Medicine Physician, \"Getting a Good Night's Sleep\".  November 16: \"Let's Talk\" a time for the group to share.  December 21: \"Let's Talk\" a time for the group to share.    [facility] Support Groups    Connections: Bariatric Care Support Group?  This is open to all [facility] (and those external to this program) pre- and post- operative bariatric surgery patients as well as their support system.   WHEN: This group meets the 2nd Tuesday of each month from 6:30 PM - 8:00 PM virtually using Microsoft Teams.   FACILITATOR: Led by Otto Kirk, Ph.D who is a Licensed Psychologist with the [facility] Comprehensive Weight Management Program.   TO REGISTER: Please send an email to Otto Kirk, Ph.D., LP at?[email or web address]?if you would like an invitation to the group and to learn about using Microsoft Teams.    2022 Meetings  June 14: Triny Johnson RD, LD at [facility], \"Nutritional Labeling\"  July 12 August 2 (Please Note Date Change)  September 13 October 11 November 8 December 13    Connections: Post-Operative Bariatric Surgery Support Group  This is a support group for [facility] bariatric patients (and those external to [facility]) who have had bariatric surgery and are at least 3 months post-surgery.  WHEN: This support group meets the 4th Wednesday of the month from 11:00 AM - 12:00 PM virtually using Microsoft Teams.   FACILITATOR: Led by Certified Bariatric Nurse, Cecilia Stephenson RN.   TO REGISTER: Please send an email to Cecilia at [email or web address] if you would like an invitation to the group and to learn about using Microsoft Teams.    2022 Meetings June 22 July 27 August 24 September 28 October 26 November 23 December " "28      [facility] Healthy Lifestyle Virtual Support Group    Healthy Lifestyle Virtual Support Group?  This is 60 minutes of small group guided discussion, support and resources. All are welcome who want a healthy lifestyle.  WHEN: This group meets monthly on a Friday from 12:30 PM - 1:30 PM virtually using Microsoft Teams.   FACILITATOR: Led by National Board Certified Health and , Cecilia De [facility].   TO REGISTER: Please send an email to Cecilia [email or web address] to receive monthly invites to the group or if you have any questions about having a health .  Prior to the meeting, a link with directions on how to join the meeting will be sent to you.    2022 Meetings  June 24: Cecilia [facility], \"Setting Limits and Boundaries\".  Jul 29: Open Forum  August 26: Guest Speaker: Deborah Black Registered Dietitian  September 30: Open Forum  October 28th: Guest Speaker: Elizabeth Hwang [facility], Health , \"Gratitude Practices\".  November 18: Guest Speaker: Aliya Ruff RD Registered Dietitian, \"Navigating How to Eat around the Holidays\".  December 16: Guest Speaker: Emilia Villegas [facility], \"Changing Your Relationship with Movement\".    ____________________________________________________________________________________________________________________________________________________________________________  [place] [facility] Get Moving Program  Our team of physical therapists is trained to help you understand and take control of your condition. They will perform a thorough evaluation to determine your ability for activity and develop a customized plan to fit your goals and physical ability.  Scheduling: Unsure if the Get Moving program is right for you? Discuss the program with your medical provider or diabetes educator. You can also call us at [phone] to ask questions or schedule an appointment.   [facility] Get Moving " Program  ____________________________________________________________________________________________________________________________________________________________________________  [facility] Diabetes Prevention Program (DPP)  If you have prediabetes and Medicare please contact us via R[employer] Video Technologieshart to learn more about the Diabetes Prevention Program (DPP)  Program Details:  [facility] offers the year-long Diabetes Prevention Program (DPP). The program helps you to make lifestyle changes that prevent or delay type 2 diabetes by supporting healthy eating, increased physical activity, stress reduction and use of coping skills.   On average, previous [facility] DPP cohorts have lost and maintained at least 5% of their starting weight throughout the program and averaged more than 150 minutes of physical activity per week.  Participants meet weekly for one-hour group sessions over sixteen weeks, every other week for the next 8 weeks, and monthly for the last six months.   A year-long maintenance program is also available for participants who complete the first year.   Location & Cost:   During the COVID-19 Public Health Emergency, the program is offered virtually. When in-person classes can resume, they will be held at [facility].  For people with Medicare, the program is covered in full. A self-pay option will also be available for those with non-Medicare insurance plans.   _________________________________________________________________________________________________________________________________________________________  Bluetooth Scale:    We hope to provide you with high quality virtual healthcare visits while social distancing for COVID-19 is necessary, as well as in the future when virtual visits may be more convenient for you.     Our technology team made it possible for Bluetooth scales to send weight measurements to our electronic medical record. This allows  weights from you weighing at home to securely flow into the medical record, which will improve telephone and virtual visits.   Additionally, studies have shown that adults actually lose more weight when their weights are automatically sent to someone else, and also that this process is not stressful for those adults.    Below is a link for purchasing the scale, with a discount code for our patients. You may call your insurance company to see if they will reimburse you for the cost of the scale, as a piece of durable medical equipment. The scales only go up to a weight of 400 pounds. This is an issue and we are working with the developer on increasing this. We found no scales that go over 400lb that have blue-tooth for connecting to [employer].    Scale to purchase: the Hello [employer]  Body  Scale: [email or web address]=aw.ds    Discount Code: We have a discount code for our patients to bring the cost down to $50, Discount code is: [place]_Scale_20%off  _______________________________________________________________________________________________________________________________________________________________________________    To work with a Behavioral Health Psychologist:    Call to schedule:    Cristofer Snow - [phone]  Elias Sherman - [phone]  Olga Mcgee - [phone]  Sharon Collins - [phone]   Indu Sullivan PhD (cannot accept Medicare) [phone]        Thank you,   [facility] Comprehensive Weight Management Team

## 2023-02-21 ENCOUNTER — TELEPHONE (OUTPATIENT)
Dept: ENDOCRINOLOGY | Facility: CLINIC | Age: 63
End: 2023-02-21
Payer: COMMERCIAL

## 2023-02-28 ENCOUNTER — MYC MEDICAL ADVICE (OUTPATIENT)
Dept: ENDOCRINOLOGY | Facility: CLINIC | Age: 63
End: 2023-02-28
Payer: COMMERCIAL

## 2023-02-28 DIAGNOSIS — R73.03 PRE-DIABETES: ICD-10-CM

## 2023-02-28 DIAGNOSIS — E66.01 CLASS 2 SEVERE OBESITY WITH SERIOUS COMORBIDITY AND BODY MASS INDEX (BMI) OF 35.0 TO 35.9 IN ADULT, UNSPECIFIED OBESITY TYPE (H): Primary | ICD-10-CM

## 2023-02-28 DIAGNOSIS — E66.812 CLASS 2 SEVERE OBESITY WITH SERIOUS COMORBIDITY AND BODY MASS INDEX (BMI) OF 35.0 TO 35.9 IN ADULT, UNSPECIFIED OBESITY TYPE (H): Primary | ICD-10-CM

## 2023-02-28 RX ORDER — SEMAGLUTIDE 2.4 MG/.75ML
2.4 INJECTION, SOLUTION SUBCUTANEOUS WEEKLY
Qty: 3 ML | Refills: 0 | Status: SHIPPED | OUTPATIENT
Start: 2023-02-28 | End: 2023-03-29

## 2023-03-21 ENCOUNTER — OFFICE VISIT (OUTPATIENT)
Dept: OBGYN | Facility: CLINIC | Age: 63
End: 2023-03-21
Payer: COMMERCIAL

## 2023-03-21 VITALS
HEART RATE: 79 BPM | SYSTOLIC BLOOD PRESSURE: 121 MMHG | BODY MASS INDEX: 35.63 KG/M2 | WEIGHT: 227 LBS | RESPIRATION RATE: 18 BRPM | DIASTOLIC BLOOD PRESSURE: 69 MMHG | HEIGHT: 67 IN | TEMPERATURE: 97.9 F

## 2023-03-21 DIAGNOSIS — N88.2 STENOSIS OF CERVIX UTERI: ICD-10-CM

## 2023-03-21 DIAGNOSIS — B97.7 HIGH RISK HPV INFECTION: Primary | ICD-10-CM

## 2023-03-21 PROCEDURE — 88307 TISSUE EXAM BY PATHOLOGIST: CPT | Performed by: PATHOLOGY

## 2023-03-21 PROCEDURE — 88175 CYTOPATH C/V AUTO FLUID REDO: CPT | Performed by: OBSTETRICS & GYNECOLOGY

## 2023-03-21 PROCEDURE — 87624 HPV HI-RISK TYP POOLED RSLT: CPT | Performed by: OBSTETRICS & GYNECOLOGY

## 2023-03-21 PROCEDURE — 57461 CONZ OF CERVIX W/SCOPE LEEP: CPT | Performed by: OBSTETRICS & GYNECOLOGY

## 2023-03-21 PROCEDURE — 88305 TISSUE EXAM BY PATHOLOGIST: CPT | Performed by: PATHOLOGY

## 2023-03-21 NOTE — NURSING NOTE
"Initial /69 (BP Location: Right arm, Patient Position: Chair, Cuff Size: Adult Regular)   Pulse 79   Temp 97.9  F (36.6  C) (Tympanic)   Resp 18   Ht 1.702 m (5' 7\")   Wt 103 kg (227 lb)   LMP 03/06/2011   BMI 35.55 kg/m   Estimated body mass index is 35.55 kg/m  as calculated from the following:    Height as of this encounter: 1.702 m (5' 7\").    Weight as of this encounter: 103 kg (227 lb). .      "

## 2023-03-21 NOTE — PROGRESS NOTES
GYN: Colposcopy/LEEP    Date/Time: 3/21/2023 3:29 PM  Performed by: Johny Moser MD  Authorized by: Johny Moser MD     Consent:     Consent obtained:  Written    Consent given by:  Patient    Procedural risks discussed:  Bleeding, infection, possible continued pain and repeat procedure    Patient questions answered: yes      Patient agrees, verbalizes understanding, and wants to proceed: yes      Educational handouts given: no      Instructions and paperwork completed: yes    Pre-procedure:     Pre-procedure timeout performed: yes      Premeds:  Ibuprofen    Prepped with: acetic acid and Lugol      Local anesthetic:  Lidocaine 1% W/O epi  Indication:     Indication:  HPV    HPV Indications:  18  Procedure:     Procedure: LEEP cone biopsy only      LEEP:  Ball 5 mm and Condyloma 1 x 0.4 cm    Under satisfactory analgesia the patient was prepped and draped in the dorsal lithotomy position: yes      [place] speculum was placed in the vagina: yes      Under colposcopic examination the transition zone was seen in entirety: yes      Intracervical block was performed: yes      Tampon inserted: no      Monsel's solution was applied: yes      Biopsy(s): yes      Location:  Cervical stenosis    Specimen to pathology: yes    Post-procedure:     Findings: Negative      Patient tolerance of procedure:  Patient tolerated the procedure well with no immediate complications  Comments:        (B97.7) High risk HPV infection  (primary encounter diagnosis)  Comment:  HR HPV #18  (N88.2) Stenosis of cervix uteri  Plan: GYN: Colposcopy/LEEP            Johny Moser MD

## 2023-03-23 ENCOUNTER — PATIENT OUTREACH (OUTPATIENT)
Dept: OBGYN | Facility: CLINIC | Age: 63
End: 2023-03-23
Payer: COMMERCIAL

## 2023-03-29 ENCOUNTER — MYC REFILL (OUTPATIENT)
Dept: ENDOCRINOLOGY | Facility: CLINIC | Age: 63
End: 2023-03-29
Payer: COMMERCIAL

## 2023-03-29 DIAGNOSIS — E66.812 CLASS 2 SEVERE OBESITY WITH SERIOUS COMORBIDITY AND BODY MASS INDEX (BMI) OF 35.0 TO 35.9 IN ADULT, UNSPECIFIED OBESITY TYPE (H): ICD-10-CM

## 2023-03-29 DIAGNOSIS — E66.01 CLASS 2 SEVERE OBESITY WITH SERIOUS COMORBIDITY AND BODY MASS INDEX (BMI) OF 35.0 TO 35.9 IN ADULT, UNSPECIFIED OBESITY TYPE (H): ICD-10-CM

## 2023-03-29 DIAGNOSIS — R73.03 PRE-DIABETES: ICD-10-CM

## 2023-03-29 RX ORDER — SEMAGLUTIDE 2.4 MG/.75ML
2.4 INJECTION, SOLUTION SUBCUTANEOUS WEEKLY
Qty: 3 ML | Refills: 0 | Status: SHIPPED | OUTPATIENT
Start: 2023-03-29 | End: 2023-04-13

## 2023-03-31 ENCOUNTER — LAB (OUTPATIENT)
Dept: LAB | Facility: CLINIC | Age: 63
End: 2023-03-31
Payer: COMMERCIAL

## 2023-03-31 DIAGNOSIS — E66.812 CLASS 2 SEVERE OBESITY WITH SERIOUS COMORBIDITY AND BODY MASS INDEX (BMI) OF 35.0 TO 35.9 IN ADULT, UNSPECIFIED OBESITY TYPE (H): ICD-10-CM

## 2023-03-31 DIAGNOSIS — R73.03 PRE-DIABETES: ICD-10-CM

## 2023-03-31 DIAGNOSIS — E66.01 CLASS 2 SEVERE OBESITY WITH SERIOUS COMORBIDITY AND BODY MASS INDEX (BMI) OF 35.0 TO 35.9 IN ADULT, UNSPECIFIED OBESITY TYPE (H): ICD-10-CM

## 2023-03-31 LAB
ALBUMIN SERPL BCG-MCNC: 4.2 G/DL (ref 3.5–5.2)
ALP SERPL-CCNC: 124 U/L (ref 35–104)
ALT SERPL W P-5'-P-CCNC: 19 U/L (ref 10–35)
ANION GAP SERPL CALCULATED.3IONS-SCNC: 12 MMOL/L (ref 7–15)
AST SERPL W P-5'-P-CCNC: 19 U/L (ref 10–35)
BILIRUB SERPL-MCNC: 0.3 MG/DL
BUN SERPL-MCNC: 18.3 MG/DL (ref 8–23)
CALCIUM SERPL-MCNC: 9.9 MG/DL (ref 8.8–10.2)
CHLORIDE SERPL-SCNC: 100 MMOL/L (ref 98–107)
CREAT SERPL-MCNC: 0.9 MG/DL (ref 0.51–0.95)
DEPRECATED CALCIDIOL+CALCIFEROL SERPL-MC: 64 UG/L (ref 20–75)
DEPRECATED HCO3 PLAS-SCNC: 25 MMOL/L (ref 22–29)
GFR SERPL CREATININE-BSD FRML MDRD: 72 ML/MIN/1.73M2
GLUCOSE SERPL-MCNC: 111 MG/DL (ref 70–99)
HBA1C MFR BLD: 5.4 % (ref 0–5.6)
POTASSIUM SERPL-SCNC: 4.2 MMOL/L (ref 3.4–5.3)
PROT SERPL-MCNC: 6.9 G/DL (ref 6.4–8.3)
PTH-INTACT SERPL-MCNC: 59 PG/ML (ref 15–65)
SODIUM SERPL-SCNC: 137 MMOL/L (ref 136–145)

## 2023-03-31 PROCEDURE — 83036 HEMOGLOBIN GLYCOSYLATED A1C: CPT

## 2023-03-31 PROCEDURE — 36415 COLL VENOUS BLD VENIPUNCTURE: CPT

## 2023-03-31 PROCEDURE — 83970 ASSAY OF PARATHORMONE: CPT

## 2023-03-31 PROCEDURE — 80053 COMPREHEN METABOLIC PANEL: CPT

## 2023-03-31 PROCEDURE — 82306 VITAMIN D 25 HYDROXY: CPT

## 2023-04-13 ENCOUNTER — VIRTUAL VISIT (OUTPATIENT)
Dept: ENDOCRINOLOGY | Facility: CLINIC | Age: 63
End: 2023-04-13
Payer: COMMERCIAL

## 2023-04-13 VITALS — WEIGHT: 218 LBS | BODY MASS INDEX: 34.21 KG/M2 | HEIGHT: 67 IN

## 2023-04-13 DIAGNOSIS — E66.812 CLASS 2 SEVERE OBESITY WITH SERIOUS COMORBIDITY AND BODY MASS INDEX (BMI) OF 35.0 TO 35.9 IN ADULT, UNSPECIFIED OBESITY TYPE (H): ICD-10-CM

## 2023-04-13 DIAGNOSIS — E66.01 CLASS 2 SEVERE OBESITY WITH SERIOUS COMORBIDITY AND BODY MASS INDEX (BMI) OF 35.0 TO 35.9 IN ADULT, UNSPECIFIED OBESITY TYPE (H): ICD-10-CM

## 2023-04-13 DIAGNOSIS — R73.03 PRE-DIABETES: ICD-10-CM

## 2023-04-13 PROCEDURE — 99213 OFFICE O/P EST LOW 20 MIN: CPT | Mod: VID

## 2023-04-13 RX ORDER — SEMAGLUTIDE 2.4 MG/.75ML
2.4 INJECTION, SOLUTION SUBCUTANEOUS WEEKLY
Qty: 3 ML | Refills: 3 | Status: SHIPPED | OUTPATIENT
Start: 2023-04-13 | End: 2023-07-20

## 2023-04-13 ASSESSMENT — PAIN SCALES - GENERAL: PAINLEVEL: NO PAIN (0)

## 2023-04-13 NOTE — NURSING NOTE
"(   Chief Complaint   Patient presents with     Follow Up    )    ( Weight: 218 lb (pt reported) )  ( Height: 5' 7\" )  ( BMI (Calculated): 34.14 )  (   )  (   )  (   )  (   )  (   )  (   )    (   )  (   )  (   )  (   )  (   )  (   )  (   )    (   Patient Active Problem List   Diagnosis     Chronic low back pain     NICOLETTE (obstructive sleep apnea)-Mild (AHI 6)     CARDIOVASCULAR SCREENING; LDL GOAL LESS THAN 130     Major depression in complete remission (H)     BMI 38.0-38.9,adult     Anxiety     Mixed stress and urge urinary incontinence     Controlled substance agreement signed     Advanced directives, counseling/discussion     Class 2 severe obesity with serious comorbidity and body mass index (BMI) of 37.0 to 37.9 in adult (H)     Cervical high risk HPV (human papillomavirus) test positive     Stenosis of cervix uteri     Major depression in complete remission (H)     Uncomplicated opioid dependence (H)     Gastroesophageal reflux disease with esophagitis, unspecified whether hemorrhage     Urinary, incontinence, stress female    )  (   Current Outpatient Medications   Medication Sig Dispense Refill     acetaminophen (TYLENOL) 650 MG CR tablet Take 1,300 mg by mouth every 8 hours as needed        CALCIUM + D OR 1 TABLET DAILY       FLUoxetine (PROZAC) 40 MG capsule Take 1 capsule (40 mg) by mouth daily 90 capsule 4     HYDROcodone-acetaminophen (NORCO)  MG per tablet Take 1 tablet by mouth every 6 hours as needed for severe pain (7-10) 30 tablet 0     ibuprofen 200 MG capsule Take 200 mg by mouth every 4 hours as needed for fever       insulin pen needle (31G X 5 MM) 31G X 5 MM miscellaneous Use Saxenda pen needles daily or as directed. 100 each 1     LORazepam (ATIVAN) 0.5 MG tablet TAKE ONE TO TWO TABLETS (0.5-1MG) BY MOUTH EVERY 8 HOURS AS NEEDED FOR ANXIETY 20 tablet 1     meloxicam (MOBIC) 7.5 MG tablet Take 1 tablet (7.5 mg) by mouth daily 90 tablet 3     methocarbamol (ROBAXIN) 750 MG tablet TAKE ONE " TO ONE AND ONE-HALF TABLETS BY MOUTH THREE TIMES A DAY AS NEEDED FOR MUSCLE SPASMS 45 tablet 1     MULTIVITAMINS OR 1 daily        omeprazole (PRILOSEC) 20 MG DR capsule Take 1 capsule (20 mg) by mouth daily 90 capsule 4     oxybutynin ER (DITROPAN XL) 15 MG 24 hr tablet Take 1 tablet (15 mg) by mouth daily 90 tablet 4     Semaglutide-Weight Management (WEGOVY) 2.4 MG/0.75ML pen Inject 2.4 mg Subcutaneous once a week 3 mL 0     Vitamin D3 (CHOLECALCIFEROL) 125 MCG (5000 UT) tablet 6000 UNITs       liraglutide - Weight Management (SAXENDA) 18 MG/3ML pen 3 mg daily 15 mL 1     Semaglutide-Weight Management (WEGOVY) 1.7 MG/0.75ML SOAJ Inject 1.7 mg Subcutaneous once a week 3 mL 1    )  ( Diabetes Eval:    )    ( Pain Eval:  No Pain (0) )    ( Wound Eval:       )    (   History   Smoking Status     Former     Packs/day: 0.50     Years: 20.00     Types: Cigarettes     Quit date: 1/1/2016   Smokeless Tobacco     Never    )    ( Signed By:  Javi Wilkerson, EMT; April 13, 2023; 10:51 AM )

## 2023-04-13 NOTE — PROGRESS NOTES
Joana Arredondo is a 62 year old who is being evaluated via a billable video visit.      How would you like to obtain your AVS? MyChart  If the video visit is dropped, the invitation should be resent by: Text to cell phone: [phone]  Will anyone else be joining your video visit? No  If patient encounters technical issues they should call [phone]    During this virtual visit the patient is located in [place], patient verifies this as the location during the entirety of this visit.     Video-Visit Details  Video Start Time: 10:59AM    Type of service:  Video Visit    Video End Time:11:15AM    Originating Location (pt. Location): Home  Distant Location (provider location):  [facility] AND SURGERY [facility]  Platform used for Video Visit: [facility] Weight Management Note     Joana Arredondo  MRN:  [id number]  :  1960  JUSTICE:  2023    Dear Marybel Aguiar MD,    I had the pleasure of seeing your patient Joana Arredondo. She is a 62 year old female who I am continuing to see for treatment of obesity related to:        2022     4:38 PM   --   I have the following health issues associated with obesity Pre-Diabetes    High Cholesterol    Sleep Apnea    Stress Incontinence   I have the following symptoms associated with obesity Knee Pain    Depression    Back Pain    Fatigue       Assessment & Plan   Problem List Items Addressed This Visit    None  Visit Diagnoses     Class 2 severe obesity with serious comorbidity and body mass index (BMI) of 35.0 to 35.9 in adult, unspecified obesity type (H)        Relevant Medications    Semaglutide-Weight Management (WEGOVY) 2.4 MG/0.75ML pen    Pre-diabetes        Relevant Medications    Semaglutide-Weight Management (WEGOVY) 2.4 MG/0.75ML pen         1. Continue Wegovy 2.4mg once weekly. Will reach out if would like to decrease dose to see if effects depression symptoms.   2. Follow up with Grisel Mathias in 3 months  "      INTERVAL HISTORY:  New MWM - 12/6/2022  Started Saxenda  1/11/2023 - wanted to continue Saxenda 3.0mg once daily.   Patient reached out via GetBack 1/25/2023 that Saxenda was no longer helpful with hunger, and would like to transition to Wegovy   Transitioned to Wegovy 1.7mg once weekly   Last seen 2/15/2023 - continued Wegovy 1.7mg once weekly   Reached out via GetBack 2/28/2023 and increase to Wegovy 2.4mg      Son is getting  this weekend. Had a nice visit in [place] visiting her mom mom. Was able to get Wegovy without concern in [place].     Anti-obesity medications:     Current:   Wegovy 2.4mg - has been on this dose for 6 weeks. No side effects. Helping with hunger.       Recent diet changes: No longer wants to eat \"junk food\" as much. Decrease in portion sizes. Having left overs. Decrease in night time snacking. Snacking on fruit.     Recent exercise/activity changes: Walked a lot in [place]. Walking dog more with change in weather.     Recent stressors: has had some increase in depression symptoms. Usually is seasonal, and gets better by now. Is on medications and has been on the same dose for many years. Is following up with PCP. No changed when increased Wegovy dose. Discussed how depression could be a rare side effect. Getting ready to retire.     Recent sleep changes: Stable     Vitamins/Labs: Reviewed. Taking Vitamin D 5000IU every other day for now. When runs out will switch to 2000IU daily     CURRENT WEIGHT:   218 lbs 0 oz    Initial Weight (lbs): 242 lbs  Last Visits Weight: 103 kg (227 lb)  Cumulative weight loss (lbs): 24  Weight Loss Percentage: 9.92%        4/12/2023     5:05 PM   Changes and Difficulties   I have made the following changes to my diet since my last visit: Still eating healthier   I have made the following changes to my activity/exercise since my last visit: walking more         MEDICATIONS:   Current Outpatient Medications   Medication Sig Dispense Refill     " "acetaminophen (TYLENOL) 650 MG CR tablet Take 1,300 mg by mouth every 8 hours as needed        CALCIUM + D OR 1 TABLET DAILY       FLUoxetine (PROZAC) 40 MG capsule Take 1 capsule (40 mg) by mouth daily 90 capsule 4     HYDROcodone-acetaminophen (NORCO)  MG per tablet Take 1 tablet by mouth every 6 hours as needed for severe pain (7-10) 30 tablet 0     ibuprofen 200 MG capsule Take 200 mg by mouth every 4 hours as needed for fever       insulin pen needle (31G X 5 MM) 31G X 5 MM miscellaneous Use Saxenda pen needles daily or as directed. 100 each 1     LORazepam (ATIVAN) 0.5 MG tablet TAKE ONE TO TWO TABLETS (0.5-1MG) BY MOUTH EVERY 8 HOURS AS NEEDED FOR ANXIETY 20 tablet 1     meloxicam (MOBIC) 7.5 MG tablet Take 1 tablet (7.5 mg) by mouth daily 90 tablet 3     methocarbamol (ROBAXIN) 750 MG tablet TAKE ONE TO ONE AND ONE-HALF TABLETS BY MOUTH THREE TIMES A DAY AS NEEDED FOR MUSCLE SPASMS 45 tablet 1     MULTIVITAMINS OR 1 daily        omeprazole (PRILOSEC) 20 MG DR capsule Take 1 capsule (20 mg) by mouth daily 90 capsule 4     oxybutynin ER (DITROPAN XL) 15 MG 24 hr tablet Take 1 tablet (15 mg) by mouth daily 90 tablet 4     Semaglutide-Weight Management (WEGOVY) 2.4 MG/0.75ML pen Inject 2.4 mg Subcutaneous once a week 3 mL 3     Vitamin D3 (CHOLECALCIFEROL) 125 MCG (5000 UT) tablet 6000 UNITs             4/12/2023     5:05 PM   Weight Loss Medication History Reviewed With Patient   If you are having side effects please describe: I am wondering if it is increasing my depression.  I've been experiencing more depression, sleepiness, lack of energy.             Objective    Ht 1.702 m (5' 7\")   Wt 98.9 kg (218 lb)   LMP 03/06/2011   BMI 34.14 kg/m      Vitals - Patient Reported  Pain Score: No Pain (0)      Vitals:  No vitals were obtained today due to virtual visit.    PHYSICAL EXAM:  GENERAL: Healthy, alert and no distress  EYES: Eyes grossly normal to inspection.  No discharge or erythema, or obvious " scleral/conjunctival abnormalities.  RESP: No audible wheeze, cough, or visible cyanosis.  No visible retractions or increased work of breathing.    SKIN: Visible skin clear. No significant rash, abnormal pigmentation or lesions.  NEURO: Cranial nerves grossly intact.  Mentation and speech appropriate for age.  PSYCH: Mentation appears normal, affect normal/bright, judgement and insight intact, normal speech and appearance well-groomed.        Sincerely,    MYRA GOLD PA-C      24 minutes spent by me on the date of the encounter doing chart review, history and exam, documentation and further activities per the note

## 2023-04-13 NOTE — PATIENT INSTRUCTIONS
"Thank you for allowing us the privilege of caring for you. We hope we provided you with the excellent service you deserve.   Please let us know if there is anything else we can do for you so that we can be sure you are completely satisfied with your care experience.    To ensure the quality of our services you may be receiving a patient satisfaction survey from an independent patient satisfaction monitoring company.    The greatest compliment you can give is a \"Likely to Recommend\"    Your visit was with MYRA JIMENEZ ELLIOTT GOLD today.    Instructions per today's visit:   1. Continue Wegovy 2.4mg once weekly. Reach out if would like to decrease dose to see if it changes depression symptoms.   2. Follow up with Myra Mathias in 3 months     _________________________________________________________________________________________________________________________________________________________  Important contact and scheduling information:  Please call our contact center at [phone] to schedule your next appointments.  To find a lab location near you, please call [phone].  For any nursing questions or concerns call Mary Salazar LPN at [phone] or Miguelina Gan RN at [phone]  Please call during clinic hours Monday through Friday 8:00a - 4:00p if you have questions or you can contact us via "[employer]"t at anytime and we will reply during clinic hours.    Lab results will be communicated through My Chart or letter (if My Chart not used). Please call the clinic if you have not received communication after 1 week or if you have any questions.?  Clinic Fax: [phone]  _________________________________________________________________________________________________________________________________________________________  If you are asked by your clinic team to have your blood pressure checked:  [facility] do offer several locations for blood pressure checks. Please follow the below link to schedule an " appointment. Scheduling an appointment at the pharmacy for a blood pressure check is now preferred.    Appointment Plus ([email or web address])  _________________________________________________________________________________________________________________________________________________________  Meal Replacement Products:    Here is the link to our new e-store where you can purchase our meal replacement products     [employer] [place] E-Store  [email or web address]    The one week starter kit is a great way to sample a variety of products and see what works for you.    If you want more information about the product go to: Fresh Steps Meals  [email or web address]    If you are an employee or [facility] or [facility] please contact your care team for a 10% estore discount    Free Shipping for orders over $75     Benefits of meal replacements products:    Portion and calorie control  Improved nutrition  Structured eating  Simplified food choices  Avoid contact with trigger foods  _________________________________________________________________________________________________________________________________________________________  Interested in working with a health ?  Health coaches work with you to improve your overall health and wellbeing.  They look at the whole person, and may involve discussion of different areas of life, including, but not limited to the four pillars of health (sleep, exercise, nutrition, and stress management). Discuss with your care team if you would like to start working a health .  Health Coaching-3 Pack: Schedule by calling [phone]    $99 for three health coaching visits    Visits may be done in person or via phone    Coaching is a partnership between the  and the client; Coaches do not prescribe or diagnose    Coaching helps inspire the client to reach his/her personal goals    _________________________________________________________________________________________________________________________________________________________  24 Week Healthy Lifestyle Plan:    Our mission in the 24-week Healthy Lifestyle Plan is to provide you with individualized care by giving you the tools, education and support you need to lose weight and maintain a healthy lifestyle. In your 24-week journey, you ll be supported by a dedicated weight loss team that includes registered dietitians, medical weight management providers, health coaches, and nurses -- all with special expertise in weight loss -- to help you every step of the way.     Monthly meetings with your registered dietician or medical weight management provider help to review your progress, update your care plan, and make any adjustments needed to ensure success. Between these visits, weekly and bi-weekly health  visits will help you focus on the four pillars of weight loss -- stress, sleep, nutrition, and exercise -- and how you can best adapt each to achieve sustainable weight loss results.    In addition, you will be given exclusive access to online wellbeing classes through [employer].  Your initial visit will be with a medical weight management provider who will help to understand your weight loss goals and ensure this program is the right fit for you. Please let our team know if you are interested in the 24 week plan by sending a message to your care team or calling [phone] to schedule.  _________________________________________________________________________________________________________________________________________________________    COMPREHENSIVE WEIGHT MANAGEMENT PROGRAM  VIRTUAL SUPPORT GROUPS    For Support Group Information:      We offer support groups for patients who are working on weight loss and considering, preparing for or have had weight loss surgery.   There is no cost for this opportunity.  You are invited to  "attend the?Virtual Support Groups?provided by any of the following locations:    [facility] via Ext[employer] Teams with Cynthia Larsen RN  2.   [place] via Extole Teams with Otto Kirk, PhD, LP  3.   [place] via Extole Teams with Cecilia Stephenson RN  4.   [facility] via Extole Teams with Cecilia De La Rosa [facility]    The following Support Group information can also be found on our website:  [email or web address]    [facility] Weight Loss Surgery Support Group    [facility] Weight Loss Surgery Support Group  The support group is a patient-lead forum that meets monthly to share experiences, encouragement and education. It is open to those who have had weight loss surgery, are scheduled for surgery, and those who are considering surgery.   WHEN: This group meets on the 3rd Wednesday of each month from 5:00PM - 6:00PM virtually using Microsoft Teams.   FACILITATOR: Led by Cynthia Larsen RD, PHILLIP, RN, the program's Clinical Coordinator.   TO REGISTER: Please contact the clinic via Neo[employer] or call the nurse line directly at [phone] to inform our staff that you would like an invite sent to you and to let us know the email you would like the invite sent to. Prior to the meeting, a link with directions on how to join the meeting will be sent to you.    2022 Meetings  Flower 15: \"Let's Talk\" a time for the group to share.  July 20: \"Let's Talk\" a time for the group to share.  August 17: \"Let's Talk\" a time for the group to share.  September 21: \"Let's Talk\" a time for the group to share.  October 19: Guest Speaker: Dr Paolo Harrell MD Pulmonologist and Sleep Medicine Physician, \"Getting a Good Night's Sleep\".  November 16: \"Let's Talk\" a time for the group to share.  December 21: \"Let's Talk\" a time for the group to share.    [facility] and [facility] Support Groups    Connections: Bariatric Care Support " "Group?  This is open to all [facility] (and those external to this program) pre- and post- operative bariatric surgery patients as well as their support system.   WHEN: This group meets the 2nd Tuesday of each month from 6:30 PM - 8:00 PM virtually using Microsoft Teams.   FACILITATOR: Led by Otto Kirk, Ph.D who is a Licensed Psychologist with the [facility] Comprehensive Weight Management Program.   TO REGISTER: Please send an email to Otto Kirk, Ph.D.,  at?[email or web address]?if you would like an invitation to the group and to learn about using Microsoft Teams.    2022 Meetings June 14: Triny Johnson, MADDISON, LD at [facility], \"Nutritional Labeling\"  July 12 August 2 (Please Note Date Change)  September 13 October 11 November 8 December 13    Connections: Post-Operative Bariatric Surgery Support Group  This is a support group for [facility] bariatric patients (and those external to [facility]) who have had bariatric surgery and are at least 3 months post-surgery.  WHEN: This support group meets the 4th Wednesday of the month from 11:00 AM - 12:00 PM virtually using Microsoft Teams.   FACILITATOR: Led by Certified Bariatric Nurse, Cecilia Stephenson RN.   TO REGISTER: Please send an email to Cecilia at [email or web address] if you would like an invitation to the group and to learn about using Microsoft Teams.    2022 Meetings June 22 July 27 August 24 September 28 October 26 November 23 December 28      [facility] Healthy Lifestyle Virtual Support Group    Healthy Lifestyle Virtual Support Group?  This is 60 minutes of small group guided discussion, support and resources. All are welcome who want a healthy lifestyle.  WHEN: This group meets monthly on a Friday from 12:30 PM - 1:30 PM virtually using Microsoft Teams.   FACILITATOR: Led by National Board Certified Health and , J[facility]AN [facility].   TO " "REGISTER: Please send an email to Cecilia [email or web address] to receive monthly invites to the group or if you have any questions about having a health .  Prior to the meeting, a link with directions on how to join the meeting will be sent to you.    2022 Meetings  June 24: Cecilia De La Rosa, [facility], \"Setting Limits and Boundaries\".  Jul 29: Open Forum  August 26: Guest Speaker: Deborah Black, Registered Dietitian  September 30: Open Forum  October 28th: Guest Speaker: Elizabeth Hwang, [facility], Health , \"Gratitude Practices\".  November 18: Guest Speaker: Aliya Ruff RD Registered Dietitian, \"Navigating How to Eat around the Holidays\".  December 16: Guest Speaker: Emilia Villegas, [facility], \"Changing Your Relationship with Movement\".    ____________________________________________________________________________________________________________________________________________________________________________  [facility] Get Moving Program  Our team of physical therapists is trained to help you understand and take control of your condition. They will perform a thorough evaluation to determine your ability for activity and develop a customized plan to fit your goals and physical ability.  Scheduling: Unsure if the Get Moving program is right for you? Discuss the program with your medical provider or diabetes educator. You can also call us at [phone] to ask questions or schedule an appointment.   [facility] Get Moving Program  ____________________________________________________________________________________________________________________________________________________________________________  [facility] Diabetes Prevention Program (DPP)  If you have prediabetes and Medicare please contact us via [facility] to learn more about the Diabetes Prevention Program (DPP)  Program Details:   [facility] offers the year-long Diabetes Prevention Program (DPP). The program helps you to make " lifestyle changes that prevent or delay type 2 diabetes by supporting healthy eating, increased physical activity, stress reduction and use of coping skills.   On average, previous [facility] DPP cohorts have lost and maintained at least 5% of their starting weight throughout the program and averaged more than 150 minutes of physical activity per week.  Participants meet weekly for one-hour group sessions over sixteen weeks, every other week for the next 8 weeks, and monthly for the last six months.   A year-long maintenance program is also available for participants who complete the first year.   Location & Cost:   During the COVID-19 Public Health Emergency, the program is offered virtually. When in-person classes can resume, they will be held at [facility].  For people with Medicare, the program is covered in full. A self-pay option will also be available for those with non-Medicare insurance plans.   _________________________________________________________________________________________________________________________________________________________  Bluetooth Scale:    We hope to provide you with high quality virtual healthcare visits while social distancing for COVID-19 is necessary, as well as in the future when virtual visits may be more convenient for you.     Our technology team made it possible for Bluetooth scales to send weight measurements to our electronic medical record. This allows weights from you weighing at home to securely flow into the medical record, which will improve telephone and virtual visits.   Additionally, studies have shown that adults actually lose more weight when their weights are automatically sent to someone else, and also that this process is not stressful for those adults.    Below is a link for purchasing the scale, with a discount code for our patients. You may call your insurance company to see if they will reimburse you for the cost  of the scale, as a piece of durable medical equipment. The scales only go up to a weight of 400 pounds. This is an issue and we are working with the developer on increasing this. We found no scales that go over 400lb that have blue-tooth for connecting to [employer].    Scale to purchase: the [employer]  Body  Scale: [email or web address]=aw.ds    Discount Code: We have a discount code for our patients to bring the cost down to $50, Discount code is: [place]_Scale_20%off  _______________________________________________________________________________________________________________________________________________________________________________    To work with a Behavioral Health Psychologist:    Call to schedule:    Cristofer Snow - [phone]  Elias Sherman - [phone]  Olga Mcgee - [phone]  Sharon Collins - [phone]   Indu Sullivan PhD (cannot accept Medicare) [phone]        Thank you,   [facility] Comprehensive Weight Management Team

## 2023-04-13 NOTE — LETTER
2023       RE: Joana Arredondo  [address] [place] [address]     Dear Colleague,    Thank you for referring your patient, Joana Arredondo, to the [facility] [place] [facility]. Please see a copy of my visit note below.    Joana Arredondo is a 62 year old who is being evaluated via a billable video visit.      How would you like to obtain your AVS? MyChart  If the video visit is dropped, the invitation should be resent by: Text to cell phone: [phone]  Will anyone else be joining your video visit? No  If patient encounters technical issues they should call [phone]    During this virtual visit the patient is located in [place], patient verifies this as the location during the entirety of this visit.     Video-Visit Details  Video Start Time: 10:59AM    Type of service:  Video Visit    Video End Time:11:15AM    Originating Location (pt. Location): Home  Distant Location (provider location):  [facility] [place]  Platform used for Video Visit: [facility] Medical Weight Management Note     Joana Arredondo  MRN:  [id number]  :  1960  JUSTICE:  2023    Dear Marybel Aguiar MD,    I had the pleasure of seeing your patient Joana Arredondo. She is a 62 year old female who I am continuing to see for treatment of obesity related to:        2022     4:38 PM   --   I have the following health issues associated with obesity Pre-Diabetes    High Cholesterol    Sleep Apnea    Stress Incontinence   I have the following symptoms associated with obesity Knee Pain    Depression    Back Pain    Fatigue       Assessment & Plan   Problem List Items Addressed This Visit    None  Visit Diagnoses       Class 2 severe obesity with serious comorbidity and body mass index (BMI) of 35.0 to 35.9 in adult, unspecified obesity type (H)        Relevant Medications     "Semaglutide-Weight Management (WEGOVY) 2.4 MG/0.75ML pen    Pre-diabetes        Relevant Medications    Semaglutide-Weight Management (WEGOVY) 2.4 MG/0.75ML pen           1. Continue Wegovy 2.4mg once weekly. Will reach out if would like to decrease dose to see if effects depression symptoms.   2. Follow up with Grisel Mathias in 3 months       INTERVAL HISTORY:  New MWM - 12/6/2022  Started Saxenda  1/11/2023 - wanted to continue Saxenda 3.0mg once daily.   Patient reached out via [employer] 1/25/2023 that Saxenda was no longer helpful with hunger, and would like to transition to Wegovy   Transitioned to Wegovy 1.7mg once weekly   Last seen 2/15/2023 - continued Wegovy 1.7mg once weekly   Reached out via [employer] 2/28/2023 and increase to Wegovy 2.4mg      Son is getting  this weekend. Had a nice visit in [place] visiting her mom mom. Was able to get Wegovy without concern in [place].     Anti-obesity medications:     Current:   Wegovy 2.4mg - has been on this dose for 6 weeks. No side effects. Helping with hunger.       Recent diet changes: No longer wants to eat \"junk food\" as much. Decrease in portion sizes. Having left overs. Decrease in night time snacking. Snacking on fruit.     Recent exercise/activity changes: Walked a lot in [place]. Walking dog more with change in weather.     Recent stressors: has had some increase in depression symptoms. Usually is seasonal, and gets better by now. Is on medications and has been on the same dose for many years. Is following up with PCP. No changed when increased Wegovy dose. Discussed how depression could be a rare side effect. Getting ready to retire.     Recent sleep changes: Stable     Vitamins/Labs: Reviewed. Taking Vitamin D 5000IU every other day for now. When runs out will switch to 2000IU daily     CURRENT WEIGHT:   218 lbs 0 oz    Initial Weight (lbs): 242 lbs  Last Visits Weight: 103 kg (227 lb)  Cumulative weight loss (lbs): 24  Weight Loss Percentage: " "9.92%        4/12/2023     5:05 PM   Changes and Difficulties   I have made the following changes to my diet since my last visit: Still eating healthier   I have made the following changes to my activity/exercise since my last visit: walking more         MEDICATIONS:   Current Outpatient Medications   Medication Sig Dispense Refill    acetaminophen (TYLENOL) 650 MG CR tablet Take 1,300 mg by mouth every 8 hours as needed       CALCIUM + D OR 1 TABLET DAILY      FLUoxetine (PROZAC) 40 MG capsule Take 1 capsule (40 mg) by mouth daily 90 capsule 4    HYDROcodone-acetaminophen (NORCO)  MG per tablet Take 1 tablet by mouth every 6 hours as needed for severe pain (7-10) 30 tablet 0    ibuprofen 200 MG capsule Take 200 mg by mouth every 4 hours as needed for fever      insulin pen needle (31G X 5 MM) 31G X 5 MM miscellaneous Use Saxenda pen needles daily or as directed. 100 each 1    LORazepam (ATIVAN) 0.5 MG tablet TAKE ONE TO TWO TABLETS (0.5-1MG) BY MOUTH EVERY 8 HOURS AS NEEDED FOR ANXIETY 20 tablet 1    meloxicam (MOBIC) 7.5 MG tablet Take 1 tablet (7.5 mg) by mouth daily 90 tablet 3    methocarbamol (ROBAXIN) 750 MG tablet TAKE ONE TO ONE AND ONE-HALF TABLETS BY MOUTH THREE TIMES A DAY AS NEEDED FOR MUSCLE SPASMS 45 tablet 1    MULTIVITAMINS OR 1 daily       omeprazole (PRILOSEC) 20 MG DR capsule Take 1 capsule (20 mg) by mouth daily 90 capsule 4    oxybutynin ER (DITROPAN XL) 15 MG 24 hr tablet Take 1 tablet (15 mg) by mouth daily 90 tablet 4    Semaglutide-Weight Management (WEGOVY) 2.4 MG/0.75ML pen Inject 2.4 mg Subcutaneous once a week 3 mL 3    Vitamin D3 (CHOLECALCIFEROL) 125 MCG (5000 UT) tablet 6000 UNITs             4/12/2023     5:05 PM   Weight Loss Medication History Reviewed With Patient   If you are having side effects please describe: I am wondering if it is increasing my depression.  I've been experiencing more depression, sleepiness, lack of energy.             Objective    Ht 1.702 m (5' 7\")  "  Wt 98.9 kg (218 lb)   LMP 03/06/2011   BMI 34.14 kg/m      Vitals - Patient Reported  Pain Score: No Pain (0)      Vitals:  No vitals were obtained today due to virtual visit.    PHYSICAL EXAM:  GENERAL: Healthy, alert and no distress  EYES: Eyes grossly normal to inspection.  No discharge or erythema, or obvious scleral/conjunctival abnormalities.  RESP: No audible wheeze, cough, or visible cyanosis.  No visible retractions or increased work of breathing.    SKIN: Visible skin clear. No significant rash, abnormal pigmentation or lesions.  NEURO: Cranial nerves grossly intact.  Mentation and speech appropriate for age.  PSYCH: Mentation appears normal, affect normal/bright, judgement and insight intact, normal speech and appearance well-groomed.        Sincerely,    MYRA GOLD PA-C      24 minutes spent by me on the date of the encounter doing chart review, history and exam, documentation and further activities per the note

## 2023-04-13 NOTE — PROGRESS NOTES
"Joana Arredondo is a 62 year old who is being evaluated via a billable video visit.      How would you like to obtain your AVS? MyChart  If the video visit is dropped, the invitation should be resent by: Text to cell phone: [phone]  Will anyone else be joining your video visit? No  If patient encounters technical issues they should call [phone]    During this virtual visit the patient is located in [place], patient verifies this as the location during the entirety of this visit.     Video-Visit Details  Video Start Time: {video visit start/end time for provider to select:468333}    Type of service:  Video Visit    Video End Time:{video visit start/end time for provider to select:152948}    Originating Location (pt. Location): {video visit patient location:917940::\"Home\"}  Distant Location (provider location):  [facility] [place]  Platform used for Video Visit: {Virtual Visit Platforms:245841::\"AmWell\"}    Jin Wilkerson, EMT-P 4/13/2023      10:14 AM    "

## 2023-04-14 ENCOUNTER — OFFICE VISIT (OUTPATIENT)
Dept: FAMILY MEDICINE | Facility: CLINIC | Age: 63
End: 2023-04-14
Payer: COMMERCIAL

## 2023-04-14 VITALS
DIASTOLIC BLOOD PRESSURE: 62 MMHG | WEIGHT: 219.8 LBS | RESPIRATION RATE: 18 BRPM | HEART RATE: 94 BPM | TEMPERATURE: 98.4 F | HEIGHT: 67 IN | SYSTOLIC BLOOD PRESSURE: 94 MMHG | BODY MASS INDEX: 34.5 KG/M2 | OXYGEN SATURATION: 97 %

## 2023-04-14 DIAGNOSIS — F11.20 UNCOMPLICATED OPIOID DEPENDENCE (H): ICD-10-CM

## 2023-04-14 DIAGNOSIS — F41.9 ANXIETY: Primary | ICD-10-CM

## 2023-04-14 DIAGNOSIS — F33.42 RECURRENT MAJOR DEPRESSIVE DISORDER, IN FULL REMISSION (H): ICD-10-CM

## 2023-04-14 DIAGNOSIS — M54.2 CERVICALGIA: ICD-10-CM

## 2023-04-14 PROCEDURE — 99214 OFFICE O/P EST MOD 30 MIN: CPT | Performed by: FAMILY MEDICINE

## 2023-04-14 ASSESSMENT — ANXIETY QUESTIONNAIRES
4. TROUBLE RELAXING: NOT AT ALL
GAD7 TOTAL SCORE: 0
8. IF YOU CHECKED OFF ANY PROBLEMS, HOW DIFFICULT HAVE THESE MADE IT FOR YOU TO DO YOUR WORK, TAKE CARE OF THINGS AT HOME, OR GET ALONG WITH OTHER PEOPLE?: NOT DIFFICULT AT ALL
GAD7 TOTAL SCORE: 0
GAD7 TOTAL SCORE: 0
1. FEELING NERVOUS, ANXIOUS, OR ON EDGE: NOT AT ALL
3. WORRYING TOO MUCH ABOUT DIFFERENT THINGS: NOT AT ALL
5. BEING SO RESTLESS THAT IT IS HARD TO SIT STILL: NOT AT ALL
7. FEELING AFRAID AS IF SOMETHING AWFUL MIGHT HAPPEN: NOT AT ALL
6. BECOMING EASILY ANNOYED OR IRRITABLE: NOT AT ALL
7. FEELING AFRAID AS IF SOMETHING AWFUL MIGHT HAPPEN: NOT AT ALL
2. NOT BEING ABLE TO STOP OR CONTROL WORRYING: NOT AT ALL
IF YOU CHECKED OFF ANY PROBLEMS ON THIS QUESTIONNAIRE, HOW DIFFICULT HAVE THESE PROBLEMS MADE IT FOR YOU TO DO YOUR WORK, TAKE CARE OF THINGS AT HOME, OR GET ALONG WITH OTHER PEOPLE: NOT DIFFICULT AT ALL

## 2023-04-14 ASSESSMENT — PATIENT HEALTH QUESTIONNAIRE - PHQ9
10. IF YOU CHECKED OFF ANY PROBLEMS, HOW DIFFICULT HAVE THESE PROBLEMS MADE IT FOR YOU TO DO YOUR WORK, TAKE CARE OF THINGS AT HOME, OR GET ALONG WITH OTHER PEOPLE: NOT DIFFICULT AT ALL
SUM OF ALL RESPONSES TO PHQ QUESTIONS 1-9: 6
SUM OF ALL RESPONSES TO PHQ QUESTIONS 1-9: 6

## 2023-04-14 ASSESSMENT — PAIN SCALES - GENERAL: PAINLEVEL: MODERATE PAIN (4)

## 2023-04-20 ENCOUNTER — PATIENT OUTREACH (OUTPATIENT)
Dept: CARE COORDINATION | Facility: CLINIC | Age: 63
End: 2023-04-20
Payer: COMMERCIAL

## 2023-04-20 ENCOUNTER — TELEPHONE (OUTPATIENT)
Dept: FAMILY MEDICINE | Facility: CLINIC | Age: 63
End: 2023-04-20
Payer: COMMERCIAL

## 2023-04-20 NOTE — TELEPHONE ENCOUNTER
Patient Quality Outreach    Patient is due for the following:   Colon Cancer Screening    Next Steps:   schedule colonoscopy    Type of outreach:    Chart review performed, no outreach needed.      Questions for provider review:    None     Yumiko Gallagher

## 2023-05-05 ENCOUNTER — TELEPHONE (OUTPATIENT)
Dept: FAMILY MEDICINE | Facility: CLINIC | Age: 63
End: 2023-05-05
Payer: COMMERCIAL

## 2023-05-05 NOTE — TELEPHONE ENCOUNTER
Patient called and triaged:      S-(situation): cough, congestion, green secretions, sore throat    B-(background): started last Wednesday    A-(assessment): patient was called, she reports she has a rattle like cough, coughing up small amounts of green secretions, chest hurts when she coughs, denies sinus pain or pressure, not short of breath, no fever, has a sore throat. Patient did 2 very recent COVID tests and they were both negative.    R-(recommendations): patient is advised to be seen in the Urgent Care today in [place] to rule out infection, hours provided, encouraged fluids to thin secretions, patient is agreeable to this plan.      ALKA Whelan

## 2023-05-05 NOTE — TELEPHONE ENCOUNTER
Symptoms    Describe your symptoms: Pt calling stating it just stated Wednesday pt's chest is very congested the hardset phlegm, very dense. Like a small pea amount that comes up. Pt said she almost throws up from it. Taking Mucinex. Pt did not have a runny nose just gunk in her lungs.  No temperture. Pt feeling under the weather. Concerned about phlegm in her lungs.     Any pain: Yes: coughing but no real pain    How long have you been having symptoms:   Have you been seen for this:  No      Preferred Pharmacy:   [place], [place] - [address]  [address] [facility] [address]  Phone: [phone] Fax: [phone] Alternate Fax: [phone]      Could we send this information to you in [employer] or would you prefer to receive a phone call?:   Patient would prefer a phone call   Okay to leave a detailed message?: Yes at Cell number on file:    Telephone Information:   Mobile [phone]       .Ora Field [facility]

## 2023-05-06 ENCOUNTER — APPOINTMENT (OUTPATIENT)
Dept: GENERAL RADIOLOGY | Facility: CLINIC | Age: 63
End: 2023-05-06
Payer: COMMERCIAL

## 2023-05-06 ENCOUNTER — HOSPITAL ENCOUNTER (EMERGENCY)
Facility: CLINIC | Age: 63
Discharge: HOME | End: 2023-05-06
Payer: COMMERCIAL

## 2023-05-06 VITALS
TEMPERATURE: 98.1 F | RESPIRATION RATE: 24 BRPM | HEART RATE: 82 BPM | DIASTOLIC BLOOD PRESSURE: 79 MMHG | SYSTOLIC BLOOD PRESSURE: 117 MMHG | OXYGEN SATURATION: 97 %

## 2023-05-06 DIAGNOSIS — J06.9 VIRAL UPPER RESPIRATORY ILLNESS: ICD-10-CM

## 2023-05-06 PROCEDURE — 71046 X-RAY EXAM CHEST 2 VIEWS: CPT

## 2023-05-06 PROCEDURE — G0463 HOSPITAL OUTPT CLINIC VISIT: HCPCS

## 2023-05-06 PROCEDURE — 99213 OFFICE O/P EST LOW 20 MIN: CPT

## 2023-05-06 RX ORDER — ALBUTEROL SULFATE 90 UG/1
2 AEROSOL, METERED RESPIRATORY (INHALATION) EVERY 6 HOURS PRN
Qty: 18 G | Refills: 0 | Status: SHIPPED | OUTPATIENT
Start: 2023-05-06 | End: 2023-10-06

## 2023-05-06 RX ORDER — BENZONATATE 200 MG/1
200 CAPSULE ORAL 3 TIMES DAILY PRN
Qty: 30 CAPSULE | Refills: 0 | Status: SHIPPED | OUTPATIENT
Start: 2023-05-06 | End: 2023-10-06

## 2023-05-06 ASSESSMENT — ENCOUNTER SYMPTOMS
RHINORRHEA: 1
SINUS PRESSURE: 1
DIARRHEA: 0
MUSCULOSKELETAL NEGATIVE: 1
PALPITATIONS: 0
SHORTNESS OF BREATH: 1
NAUSEA: 0
COUGH: 1
CHEST TIGHTNESS: 1
VOMITING: 0
ABDOMINAL PAIN: 0

## 2023-05-06 NOTE — DISCHARGE INSTRUCTIONS
Return for worsening symptoms such as increasing shortness of breath, chest pain, dizziness or any other new concerns.  Albuterol inhaler as needed for shortness of breath and cough.  Can also use Tessalon Perles as needed up to 3 times daily for cough.

## 2023-05-06 NOTE — ED PROVIDER NOTES
History     Chief Complaint   Patient presents with     Cough     Chest congestion/ cough has had it since Thursday.      HPI  Charlotte Arredondo is a 62 year old female who presents urgent care for concern of cough and chest congestion.  Patient states that she started to develop a cough starting 3 days ago.  States that it has been productive at times with a thick green sputum.  Does report some mild chest tightness at times as well.  Denies noting any wheezing.  Reports some mild intermittent shortness of breath.  Patient has had a sore throat and started to have some nasal congestion starting today.  Intermittent headaches.  Has not been running any fevers.  Reports of near posttussive vomiting but has otherwise not had any nausea, vomiting or diarrhea. Denies any abdominal pain.  Completed 2 negative COVID-19 test at home.  Denies any other concerns at this time.    Allergies:  Allergies   Allergen Reactions     Nkda [No Known Drug Allergy]        Problem List:    Patient Active Problem List    Diagnosis Date Noted     Gastroesophageal reflux disease with esophagitis, unspecified whether hemorrhage 11/22/2022     Priority: Medium     Urinary, incontinence, stress female 11/22/2022     Priority: Medium     Major depression in complete remission (H) 04/28/2022     Priority: Medium     Uncomplicated opioid dependence (H) 04/28/2022     Priority: Medium     Stenosis of cervix uteri 06/09/2021     Priority: Medium     Advanced directives, counseling/discussion 02/19/2020     Priority: Medium     Patient has completed an Advance/Health Care Directive (HCD), to bring in copy to be scanned into Epic.    Elizabeth Field, [facility]  February 19, 2020         Class 2 severe obesity with serious comorbidity and body mass index (BMI) of 37.0 to 37.9 in adult (H) 02/19/2020     Priority: Medium     Cervical high risk HPV (human papillomavirus) test positive 02/18/2020     Priority: Medium     Alfred done in 1983 and cone biopsy  and cryotherapy per pt.  2014 NIL pap, Neg HPV.  2/19/20 NIL pap with + HR HPV 18. Plan colp.  06/08/20 Puposky Bx - STEVEN 1. Cotest due 6/8/21.   4/23/21 NIL pap, + HR HPV 18. Plan colp due bef 7/23/21 6/9/21 Puposky Bx & ECC no STEVEN. Plan: cotest in 1 year  4/28/22 NIL pap, +HR HPV 18. Plan: colposcopy due before 7/28/22 7/26/22 Colpo bx non diagnostic (no tissue present), ECC neg but rare to absent endocervical mucosa present. Plan: shallow LEEP due before 10/26/22  3/21/23 LEEP negative, inflammation, ECC neg. NIL pap, +HR HPV 18. Plan: cotest in 1 year  3/28/23 Pt notified       Controlled substance agreement signed 03/10/2016     Priority: Medium     Mixed stress and urge urinary incontinence 01/22/2016     Priority: Medium     Anxiety 03/05/2014     Priority: Medium     BMI 38.0-38.9,adult 05/12/2013     Priority: Medium     Major depression in complete remission (H) 04/20/2011     Priority: Medium     Probably mild seasonal affective disorder too, does have light box at home and encouraged to use  Off SSRIs, doing well (PHQ-9 1), feels is benefiting from low dose HRT       CARDIOVASCULAR SCREENING; LDL GOAL LESS THAN 130 10/31/2010     Priority: Medium     NICOLETTE (obstructive sleep apnea)-Mild (AHI 6) 10/05/2010     Priority: Medium     Chronic low back pain 11/24/2008     Priority: Medium     724.5 BACK PAIN  Followed with Dr. Montano  Note: She has had therapy and does exercises and changed mattresses, but every once in a while she has low back pain precluding sleep.  Plan: HYDROCODONE-ACETAMINOPHEN 10/325  #40 at at time, taking 1/2 to 1 tablet at bedtime PRN        Refill -- she has been using this judiciously.  Over the past year (since spring 2014 - summer 2015) a supply of 40 tabs has generally been lasting about 3 months   Patient last seen regarding the back pain in November 2014, OK to continue quarterly refill when requested, call patient if requesting refill significantly before 3 months  Will obtain  written narcotic agreement at next clinic visit.          Past Medical History:    Past Medical History:   Diagnosis Date     Basal cell carcinoma      Cervical high risk HPV (human papillomavirus) test positive 2020     Neoplasm of uncertain behavior of skin 2012     Tobacco use disorder 2005       Past Surgical History:    Past Surgical History:   Procedure Laterality Date     COLONOSCOPY  10/22/2012    Procedure: COLONOSCOPY;  Colonoscopy;  Surgeon: Robert Encinas MD;  Location: [facility]     CONIZATION LEEP       ENT SURGERY       LAPAROSCOPIC CHOLECYSTECTOMY  10/21/2010    LAPAROSCOPIC CHOLECYSTECTOMY performed by DAMIR WING at [facility]     SURGICAL HISTORY OF -       Rt wrist surgery     SURGICAL HISTORY OF -       breast augmentation     SURGICAL HISTORY OF -   10/15/2010    EUS - GB bulding w/sludge, Nl bile duct w/no choledocholithiasis or sludge.      SURGICAL HISTORY OF -   2006    1.  Left shoulder arthroscopy with arthroscopic subacromial decompression.      TONSILLECTOMY & ADENOIDECTOMY  as child    tonsils and adenoids       Family History:    Family History   Problem Relation Age of Onset     Diabetes Father         type 2     C.A.D. Father      Heart Disease Father         had 2 heart attacks,68 for first one 79 on second     Cancer - colorectal Paternal Grandfather      Diabetes Sister         type 2       Social History:  Marital Status:   [5]  Social History     Tobacco Use     Smoking status: Every Day     Packs/day: 0.50     Years: 20.00     Pack years: 10.00     Types: Vaping Device, Cigarettes     Last attempt to quit: 2016     Years since quittin.3     Smokeless tobacco: Never     Tobacco comments:     using E-cig   Vaping Use     Vaping status: Never Used   Substance Use Topics     Alcohol use: No     Drug use: No        Medications:    acetaminophen (TYLENOL) 650 MG CR tablet  CALCIUM + D OR  FLUoxetine (PROZAC) 20 MG  capsule  FLUoxetine (PROZAC) 40 MG capsule  HYDROcodone-acetaminophen (NORCO)  MG per tablet  ibuprofen 200 MG capsule  insulin pen needle (31G X 5 MM) 31G X 5 MM miscellaneous  meloxicam (MOBIC) 7.5 MG tablet  methocarbamol (ROBAXIN) 750 MG tablet  MULTIVITAMINS OR  omeprazole (PRILOSEC) 20 MG DR capsule  oxybutynin ER (DITROPAN XL) 15 MG 24 hr tablet  Semaglutide-Weight Management (WEGOVY) 2.4 MG/0.75ML pen  Vitamin D3 (CHOLECALCIFEROL) 125 MCG (5000 UT) tablet          Review of Systems   HENT: Positive for congestion, rhinorrhea and sinus pressure. Negative for ear pain.    Respiratory: Positive for cough, chest tightness and shortness of breath.    Cardiovascular: Negative for chest pain and palpitations.   Gastrointestinal: Negative for abdominal pain, diarrhea, nausea and vomiting.   Genitourinary: Negative.    Musculoskeletal: Negative.    Skin: Negative for rash.       Physical Exam   BP: 117/79  Pulse: 82  Temp: 98.1  F (36.7  C)  Resp: 24  SpO2: 97 %      Physical Exam  Constitutional:       General: She is not in acute distress.     Appearance: Normal appearance. She is not diaphoretic.   HENT:      Head: Atraumatic.      Right Ear: Tympanic membrane normal.      Nose: Nose normal.      Mouth/Throat:      Mouth: Mucous membranes are moist.      Pharynx: No oropharyngeal exudate or posterior oropharyngeal erythema.   Eyes:      General: No scleral icterus.     Conjunctiva/sclera: Conjunctivae normal.   Cardiovascular:      Rate and Rhythm: Normal rate.      Heart sounds: Normal heart sounds.   Pulmonary:      Effort: No respiratory distress.      Breath sounds: Examination of the left-upper field reveals wheezing. Examination of the left-middle field reveals wheezing. Wheezing present. No rhonchi or rales.   Abdominal:      General: Abdomen is flat.   Musculoskeletal:      Cervical back: Neck supple. No tenderness.   Lymphadenopathy:      Cervical: No cervical adenopathy.   Skin:     General: Skin  is warm.      Capillary Refill: Capillary refill takes less than 2 seconds.      Findings: No rash.   Neurological:      Mental Status: She is alert.         ED Course                 Procedures           Results for orders placed or performed during the hospital encounter of 05/06/23 (from the past 24 hour(s))   Chest XR,  PA & LAT    Narrative    EXAM: XR CHEST 2 VIEWS  LOCATION: [facility]  DATE/TIME: 5/6/2023 9:36 AM CDT    INDICATION: cough, shortness of breath  COMPARISON: None.      Impression    IMPRESSION: Lungs are clear. Heart and pulmonary vascularity are normal. No signs of acute disease. Variant drainage of the azygos vein.       Medications - No data to display    Assessments & Plan (with Medical Decision Making)     Patient presents to urgent care for 4-day history of cough.  Patient is afebrile, nonhypoxic and vital signs are otherwise reassuring on arrival today.  Exam as above.  Given patient's shortness of breath, chest x-ray was performed today which did not reveal any acute cardiopulmonary irregularities, no signs of pneumonia, pleural effusions, pneumothorax, or pulmonary edema.  Wheezing is heard on the left side of the lungs with auscultation.  As such patient provided with albuterol inhaler to use as needed for shortness of breath, cough, chest tightness.  Lower concern for cardiac causes of patient's symptoms today and further evaluation was deferred.  Offered testing for COVID-19, influenza today, however patient declined.  Tessalon Perles as needed for cough up to 3 times daily.  Recommended patient return for any new or worsening symptoms such as chest pain, shortness of breath, dizziness, or any other new concerns.  Handouts provided.  Patient agrees with plan and was discharged in good condition.    I have reviewed the nursing notes.    I have reviewed the findings, diagnosis, plan and need for follow up with the patient.        Discharge Medication List as  of 5/6/2023 10:05 AM      START taking these medications    Details   albuterol (PROAIR HFA/PROVENTIL HFA/VENTOLIN HFA) 108 (90 Base) MCG/ACT inhaler Inhale 2 puffs into the lungs every 6 hours as needed for shortness of breath, wheezing or cough, Disp-18 g, R-0, E-PrescribePharmacy may dispense brand covered by insurance (Proair, or proventil or ventolin or generic albuterol inhaler)      benzonatate (TESSALON) 200 MG capsule Take 1 capsule (200 mg) by mouth 3 times daily as needed for cough, Disp-30 capsule, R-0, E-Prescribe             Final diagnoses:   Viral upper respiratory illness       5/6/2023   [facility] EMERGENCY DEPT    Disclaimer:  This note consists of symbols derived from keyboarding, dictation and/or voice recognition software.  As a result, there may be errors in the script that have gone undetected.  Please consider this when interpreting information found in this chart.       Lon Kelley APRN CNP  05/06/23 1155

## 2023-05-23 ENCOUNTER — THERAPY VISIT (OUTPATIENT)
Dept: PHYSICAL THERAPY | Facility: CLINIC | Age: 63
End: 2023-05-23
Attending: FAMILY MEDICINE
Payer: COMMERCIAL

## 2023-05-23 DIAGNOSIS — M54.2 CERVICALGIA: ICD-10-CM

## 2023-05-23 PROCEDURE — 97110 THERAPEUTIC EXERCISES: CPT | Mod: GP | Performed by: PHYSICAL THERAPIST

## 2023-05-23 PROCEDURE — 97161 PT EVAL LOW COMPLEX 20 MIN: CPT | Mod: GP | Performed by: PHYSICAL THERAPIST

## 2023-05-23 PROCEDURE — 97140 MANUAL THERAPY 1/> REGIONS: CPT | Mod: GP | Performed by: PHYSICAL THERAPIST

## 2023-05-23 NOTE — PROGRESS NOTES
PHYSICAL THERAPY EVALUATION  Type of Visit: Evaluation    See electronic medical record for Abuse and Falls Screening details.    Subjective   Pt presents w/ chronic B neck/UT pain since Aug 2022.  No specific injury but went on a 2 day drive 1 [address].  Pt notes she hold tension in neck and shoulder blades.  Pain @ best 5/10,   @ worst 7/10.   No radiating pain, numbness, tingling.   Pt  Notes posterior HA 3X/week.  Pt is R dominant.    Meds: methocarbinol.  Worse:  Just uncomfortable w/daily tasks  Better: meds/ heat/ ice.  Tried doing stretches but has not helped.  Pt has inversion table not helping,     Presenting condition or subjective complaint: Neck pain  Date of onset: 08/01/22    Relevant medical history:     Dates & types of surgery: Shoulder surgery in 2006, L decompression    Prior diagnostic imaging/testing results:     no tests  Prior therapy history for the same diagnosis, illness or injury: Yes 8-10 years ago    Prior Level of Function   Transfers: Independent  Ambulation: Independent  ADL: Independent  IADL:     Living Environment  Social support: Alone   Type of home: House   Stairs to enter the home: Yes 2 Is there a railing: No   Ramp: No   Stairs inside the home: Yes 8 Is there a railing: Yes   Help at home: None    Employment: Yes   Has computer glasses, works from home.    Hobbies/Interests: Gardening, walking, playing with grandkids    Patient goals for therapy: Not have constant pain         Objective   CERVICAL SPINE EVALUATION  INTEGUMENTARY (edema, incisions):   POSTURE: mild dowagers, L shoulder elevated    ROM:   (Degrees) Left AROM Right AROM    Cervical Flexion WNL    Cervical Extension WNL    Cervical Side bend 40% 50%    Cervical Rotation 90% WNL    Cervical Protrusion     Cervical Retraction     Thoracic Flexion     Thoracic Extension     Thoracic Rotation        B shoulder AROM WNL    MYOTOMES:    Left Right   C1-2 (Neck Flexion)     C3 (Neck Side Bend)      C4  (Shrug) 5 5   C5 (Deltoid) 5 5   C6 (Biceps) 5 5   C7 (Triceps) 5 5   C8 (Thumb Ext)     T1 (Intrinsics)         Special Tests: aLAR LIG NEG,  QUADRANT TEST r NEG, l + FOR l SIDED SYMPTOMS.  SLIGHT IMPROVEMENT W/ DISTRACTION  PALPATION: INCREASED TONE B UT / LEVATOR. ROMBOIDS  SPINAL SEGMENTAL CONCLUSIONS: NEG  ERS/ FRS UPPER THORACIC SPINE.  MILD TIGHTNESS C2.  Decreased L cervical sideglides.       Assessment & Plan   CLINICAL IMPRESSIONS   Medical Diagnosis: Cervicalgia    Treatment Diagnosis: neck pain   Impression/Assessment: Patient is a 62 year old female with neck/ shoulder pain and HA complaints.  The following significant findings have been identified: Pain, Decreased ROM/flexibility, Decreased strength and Impaired posture. These impairments interfere with their ability to perform self care tasks, work tasks and recreational activities as compared to previous level of function.     Clinical Decision Making (Complexity):   Clinical Presentation: Stable/Uncomplicated  Clinical Presentation Rationale: based on medical and personal factors listed in PT evaluation  Clinical Decision Making (Complexity): Low complexity    PLAN OF CARE  Treatment Interventions:  Modalities:tens unit  Interventions: Manual Therapy, Neuromuscular Re-education, Therapeutic Exercise, Self-Care/Home Management    Long Term Goals     PT Goal 1  Goal Identifier: 1.  Goal Description: Pt will report pain as intermittent no > 4/10 for improved tolerance to daily tasks  Target Date: 07/22/23  PT Goal 2  Goal Identifier: 2.  Goal Description: Pt will report decreased HA to 1X/wk for increased tolerance to daily activity  Target Date: 07/22/23  PT Goal 3  Goal Identifier: 3.  Goal Description: Pt will be independent and consistent w/HEP to manage symptoms and have increased awareness of posture  Goal Progress: t+60      Frequency of Treatment: 1  Duration of Treatment: 8 weeks    Education Assessment:   Learner/Method:  Patient;Listening;Reading;Demonstration;Pictures/Video  Education Comments: pin to phone    Risks and benefits of evaluation/treatment have been explained.   Patient/Family/caregiver agrees with Plan of Care.     Evaluation Time:     PT Eval, Low Complexity Minutes (79093): 25      Signing Clinician: Irma Schilling PT

## 2023-06-06 ENCOUNTER — THERAPY VISIT (OUTPATIENT)
Dept: PHYSICAL THERAPY | Facility: CLINIC | Age: 63
End: 2023-06-06
Attending: FAMILY MEDICINE
Payer: COMMERCIAL

## 2023-06-06 DIAGNOSIS — M54.2 CERVICALGIA: Primary | ICD-10-CM

## 2023-06-06 PROCEDURE — 97140 MANUAL THERAPY 1/> REGIONS: CPT | Mod: GP | Performed by: PHYSICAL THERAPIST

## 2023-06-06 PROCEDURE — 97110 THERAPEUTIC EXERCISES: CPT | Mod: GP | Performed by: PHYSICAL THERAPIST

## 2023-06-10 ENCOUNTER — HEALTH MAINTENANCE LETTER (OUTPATIENT)
Age: 63
End: 2023-06-10

## 2023-06-16 ENCOUNTER — TELEPHONE (OUTPATIENT)
Dept: FAMILY MEDICINE | Facility: CLINIC | Age: 63
End: 2023-06-16
Payer: COMMERCIAL

## 2023-06-16 DIAGNOSIS — F11.90 CHRONIC, CONTINUOUS USE OF OPIOIDS: Primary | ICD-10-CM

## 2023-06-16 DIAGNOSIS — G89.29 CHRONIC LOW BACK PAIN WITHOUT SCIATICA, UNSPECIFIED BACK PAIN LATERALITY: ICD-10-CM

## 2023-06-16 DIAGNOSIS — M54.50 CHRONIC LOW BACK PAIN WITHOUT SCIATICA, UNSPECIFIED BACK PAIN LATERALITY: ICD-10-CM

## 2023-06-16 NOTE — LETTER
Opioid / Opioid Plus Controlled Substance Agreement    This is an agreement between you and your provider about the safe and appropriate use of controlled substance/opioids prescribed by your care team. Controlled substances are medicines that can cause physical and mental dependence (abuse).    There are strict laws about having and using these medicines. We here at [facility] are committing to working with you in your efforts to get better. To support you in this work, we ll help you schedule regular office appointments for medicine refills. If we must cancel or change your appointment for any reason, we ll make sure you have enough medicine to last until your next appointment.     As a Provider, I will:    Listen carefully to your concerns and treat you with respect.     Recommend a treatment plan that I believe is in your best interest. This plan may involve therapies other than opioid pain medication.     Talk with you often about the possible benefits, and the risk of harm of any medicine that we prescribe for you.     Provide a plan on how to taper (discontinue or go off) using this medicine if the decision is made to stop its use.    As a Patient, I understand that opioid(s):     Are a controlled substance prescribed by my care team to help me function or work and manage my condition(s).     Are strong medicines and can cause serious side effects such as:    Drowsiness, which can seriously affect my driving ability    A lower breathing rate, enough to cause death    Harm to my thinking ability     Depression     Abuse of and addiction to this medicine    Need to be taken exactly as prescribed. Combining opioids with certain medicines or chemicals (such as illegal drugs, sedatives, sleeping pills, and benzodiazepines) can be dangerous or even fatal. If I stop opioids suddenly, I may have severe withdrawal symptoms.    Do not work for all types of pain nor for all patients. If they re not helpful, I may  be asked to stop them.        The risks, benefits and side effects of these medicine(s) were explained to me. I agree that:  1. I will take part in other treatments as advised by my care team. This may be psychiatry or counseling, physical therapy, behavioral therapy, group treatment or a referral to a specialist.     2. I will keep all my appointments. I understand that this is part of the monitoring of opioids. My care team may require an office visit for EVERY opioid/controlled substance refill. If I miss appointments or don t follow instructions, my care team may stop my medicine.    3. I will take my medicines as prescribed. I will not change the dose or schedule unless my care team tells me to. There will be no refills if I run out early.     4. I may be asked to come to the clinic and complete a urine drug test or complete a pill count at any time. If I don t give a urine sample or participate in a pill count, the care team may stop my medicine.    5. I will only receive prescriptions from this clinic for chronic pain. If I am treated by another provider for acute pain issues, I will tell them that I am taking opioid pain medication for chronic pain and that I have a treatment agreement with this provider. I will inform my [facility] care team within one business day if I am given a prescription for any pain medication by another healthcare provider. My [facility] care team can contact other providers and pharmacists about my use of any medicines.    6. It is up to me to make sure that I don t run out of my medicines on weekends or holidays. If my care team is willing to refill my opioid prescription without a visit, I must request refills only during office hours. Refills may take up to 3 business days to process. I will use one pharmacy to fill all my opioid and other controlled substance prescriptions. I will notify the clinic about any changes to my insurance or medication  availability.    7. I am responsible for my prescriptions. If the medicine/prescription is lost, stolen or destroyed, it will not be replaced. I also agree not to share controlled substance medicines with anyone.    8. I am aware I should not use any illegal or recreational drugs. I agree not to drink alcohol unless my care team says I can.       9. If I enroll in the [place] [facility] Cann[facility] program, I will tell my care team prior to my next refill.     10. I will tell my care team right away if I become pregnant, have a new medical problem treated outside of my regular clinic, or have a change in my medications.    11. I understand that this medicine can affect my thinking, judgment and reaction time. Alcohol and drugs affect the brain and body, which can affect the safety of my driving. Being under the influence of alcohol or drugs can affect my decision-making, behaviors, personal safety, and the safety of others. Driving while impaired (DWI) can occur if a person is driving, operating, or in physical control of a car, motorcycle, boat, snowmobile, ATV, motorbike, off-road vehicle, or any other motor vehicle ([place] Statute 169A.20). I understand the risk if I choose to drive or operate any vehicle or machinery.    I understand that if I do not follow any of the conditions above, my prescriptions or treatment may be stopped or changed.          Opioids  What You Need to Know    What are opioids?   Opioids are pain medicines that must be prescribed by a doctor. They are also known as narcotics.     Examples are:   1. morphine (MS Contin, Opal)  2. oxycodone (Oxycontin)  3. oxycodone and acetaminophen (Percocet)  4. hydrocodone and acetaminophen (Vicodin, Norco)   5. fentanyl patch (Duragesic)   6. hydromorphone (Dilaudid)   7. methadone  8. codeine (Tylenol #3)     What do opioids do well?   Opioids are best for severe short-term pain such as after a surgery or injury. They may work well for cancer pain. They may  help some people with long-lasting (chronic) pain.     What do opioids NOT do well?   Opioids never get rid of pain entirely, and they don t work well for most patients with chronic pain. Opioids don t reduce swelling, one of the causes of pain.                                    Other ways to manage chronic pain and improve function include:       Treat the health problem that may be causing pain    Anti-inflammation medicines, which reduce swelling and tenderness, such as ibuprofen (Advil, Motrin) or naproxen (Aleve)    Acetaminophen (Tylenol)    Antidepressants and anti-seizure medicines, especially for nerve pain    Topical treatments such as patches or creams    Injections or nerve blocks    Chiropractic or osteopathic treatment    Acupuncture, massage, deep breathing, meditation, visual imagery, aromatherapy    Use heat or ice at the pain site    Physical therapy     Exercise    Stop smoking    Take part in therapy       Risks and side effects     Talk to your doctor before you start or decide to keep taking opioids. Possible side effects include:      Lowering your breathing rate enough to cause death    Overdose, including death, especially if taking higher than prescribed doses    Worse depression symptoms; less pleasure in things you usually enjoy    Feeling tired or sluggish    Slower thoughts or cloudy thinking    Being more sensitive to pain over time; pain is harder to control    Trouble sleeping or restless sleep    Changes in hormone levels (for example, less testosterone)    Changes in sex drive or ability to have sex    Constipation    Unsafe driving    Itching and sweating    Dizziness    Nausea, throwing up and dry mouth    What else should I know about opioids?    Opioids may lead to dependence, tolerance, or addiction.      Dependence means that if you stop or reduce the medicine too quickly, you will have withdrawal symptoms. These include loose poop (diarrhea), jitters, flu-like symptoms,  nervousness and tremors. Dependence is not the same as addiction.                       Tolerance means needing higher doses over time to get the same effect. This may increase the chance of serious side effects.      Addiction is when people improperly use a substance that harms their body, their mind or their relations with others. Use of opiates can cause a relapse of addiction if you have a history of drug or alcohol abuse.      People who have used opioids for a long time may have a lower quality of life, worse depression, higher levels of pain and more visits to doctors.    You can overdose on opioids. Take these steps to lower your risk of overdose:    1. Recognize the signs:  Signs of overdose include decrease or loss of consciousness (blackout), slowed breathing, trouble waking up and blue lips. If someone is worried about overdose, they should call 911.    2. Talk to your doctor about Narcan (naloxone).   If you are at risk for overdose, you may be given a prescription for Narcan. This medicine very quickly reverses the effects of opioids.   If you overdose, a friend or family member can give you Narcan while waiting for the ambulance. They need to know the signs of overdose and how to give Narcan.     3. Don't use alcohol or street drugs.   Taking them with opioids can cause death.    4. Do not take any of these medicines unless your doctor says it s OK. Taking these with opioids can cause death:    Benzodiazepines, such as lorazepam (Ativan), alprazolam (Xanax) or diazepam (Valium)    Muscle relaxers, such as cyclobenzaprine (Flexeril)    Sleeping pills like zolpidem (Ambien)     Other opioids      How to keep you and other people safe while taking opioids:    1. Never share your opioids with others.  Opioid medicines are regulated by the Drug Enforcement Agency (JAMIE). Selling or sharing medications is a criminal act.    2. Be sure to store opioids in a secure place, locked up if possible. Young children  can easily swallow them and overdose.    3. When you are traveling with your medicines, keep them in the original bottles. If you use a pill box, be sure you also carry a copy of your medicine list from your clinic or pharmacy.    4. Safe disposal of opioids    Most pharmacies have places to get rid of medicine, called disposal kiosks. Medicine disposal options are also available in every [facility]. Search your county and  medication disposal  to find more options. You can find more details at:  [email or web address]     I agree that my provider, clinic care team, and pharmacy may work with any city, state or federal law enforcement agency that investigates the misuse, sale, or other diversion of my controlled medicine. I will allow my provider to discuss my care with, or share a copy of, this agreement with any other treating provider, pharmacy or emergency room where I receive care.    I have read this agreement and have asked questions about anything I did not understand.    _______________________________________________________  Patient Signature - Charlotte Arredondo _____________________                   Date     _______________________________________________________  Provider Signature - Marybel Aguiar MD   _____________________                   Date     _______________________________________________________  Witness Signature (required if provider not present while patient signing)   _____________________                   Date

## 2023-06-20 RX ORDER — HYDROCODONE BITARTRATE AND ACETAMINOPHEN 10; 325 MG/1; MG/1
1 TABLET ORAL EVERY 6 HOURS PRN
Qty: 30 TABLET | Refills: 0 | Status: SHIPPED | OUTPATIENT
Start: 2023-06-20 | End: 2023-09-14

## 2023-06-20 NOTE — TELEPHONE ENCOUNTER
DAWNA and CSA due. I did refill but please have her do these at her earliest convenience. DAWNA ordered. CSA Completed. Please print and hold at  for her to sign when she comes for lab.

## 2023-07-11 PROBLEM — M54.2 CERVICALGIA: Status: RESOLVED | Noted: 2023-05-23 | Resolved: 2023-07-11

## 2023-07-11 NOTE — PROGRESS NOTES
06/06/23 0500   Appointment Info   Signing clinician's name / credentials Irma Schilling PT   Total/Authorized Visits 365   Visits Used 2   Medical Diagnosis Cervicalgia   PT Tx Diagnosis neck pain   Progress Note/Certification   Onset of illness/injury or Date of Surgery 08/01/22   Therapy Frequency 1   Predicted Duration 8 weeks   Progress Note Due Date 07/22/23   GOALS   PT Goals 2;3   PT Goal 1   Goal Identifier 1.   Goal Description Pt will report pain as intermittent no > 4/10 for improved tolerance to daily tasks   Target Date 07/22/23   PT Goal 2   Goal Identifier 2.   Goal Description Pt will report decreased HA to 1X/wk for increased tolerance to daily activity   Target Date 07/22/23   PT Goal 3   Goal Identifier 3.   Goal Description Pt will be independent and consistent w/HEP to manage symptoms and have increased awareness of posture   Goal Progress 7/22/23   Subjective Report   Subjective Report Pt reports feeling a little better.  Pain level 2/10, notes decreased neck tightness.   No recent HA's.  Pt reports ex went good.   Objective Measures   Objective Measure CROM flex/ext WNL,  B rot 90%   Treatment Interventions (PT)   Therapeutic Procedure/Exercise   Ther Proc 1 - Details Diaphragmatic breathing--needs cuing. .    UT stretch B.   review neck ext sally.   LT sets. RTB row and low row X 10 each   Skilled Intervention ex to improve mobility/ strength to improve daily function   Manual Therapy   Manual Therapy 1 - Details Occipital releases, manual traction.  1st rib inferior glides supine B.   Cervical sideglides.   Tool assisted STM to B UT/ levator/ upper rhomboids seated w/ pillow for arm suport   Skilled Intervention to improve mobility/ decrease tension   Patient Response/Progress Pt reported manual treatment felt good   Plan   Home program ex as above/ per PTRX, posture   Plan  Pt cancelled follow up visits and did not reschedule.  Discharge from physical therapy   comments Pt continued to  work towards goals         DISCHARGE  Reason for Discharge: Patient has failed to schedule further appointments.    Equipment Issued: none    Discharge Plan: Patient to continue home program.    Referring Provider:  Marybel Aguiar

## 2023-07-20 ENCOUNTER — VIRTUAL VISIT (OUTPATIENT)
Dept: ENDOCRINOLOGY | Facility: CLINIC | Age: 63
End: 2023-07-20
Payer: COMMERCIAL

## 2023-07-20 VITALS — HEIGHT: 67 IN | WEIGHT: 207 LBS | BODY MASS INDEX: 32.49 KG/M2

## 2023-07-20 DIAGNOSIS — E66.812 CLASS 2 SEVERE OBESITY WITH SERIOUS COMORBIDITY AND BODY MASS INDEX (BMI) OF 35.0 TO 35.9 IN ADULT, UNSPECIFIED OBESITY TYPE (H): ICD-10-CM

## 2023-07-20 DIAGNOSIS — E66.01 CLASS 2 SEVERE OBESITY WITH SERIOUS COMORBIDITY AND BODY MASS INDEX (BMI) OF 35.0 TO 35.9 IN ADULT, UNSPECIFIED OBESITY TYPE (H): ICD-10-CM

## 2023-07-20 DIAGNOSIS — R73.03 PRE-DIABETES: ICD-10-CM

## 2023-07-20 PROCEDURE — 99214 OFFICE O/P EST MOD 30 MIN: CPT | Mod: VID

## 2023-07-20 RX ORDER — SEMAGLUTIDE 2.4 MG/.75ML
2.4 INJECTION, SOLUTION SUBCUTANEOUS WEEKLY
Qty: 3 ML | Refills: 3 | Status: SHIPPED | OUTPATIENT
Start: 2023-07-20 | End: 2023-11-13

## 2023-07-20 ASSESSMENT — PAIN SCALES - GENERAL: PAINLEVEL: NO PAIN (0)

## 2023-07-20 NOTE — NURSING NOTE
Is the patient currently in the state of [place]? YES    Visit mode:VIDEO    If the visit is dropped, the patient can be reconnected by: VIDEO VISIT: Text to cell phone: [phone]    Will anyone else be joining the visit? NO      How would you like to obtain your AVS? MyChart    Are changes needed to the allergy or medication list? NO    Reason for visit: CAROLINE Schreiber on 7/20/2023 at 1:44 PM

## 2023-07-20 NOTE — LETTER
2023       RE: [place] Arredondo  [id number] [place] [address]     Dear Colleague,    Thank you for referring your patient, [place] Arredondo, to the [facility] WEIGHT MANAGEMENT CLINIC [place] at [facility]. Please see a copy of my visit note below.    Virtual Visit Details    Type of service:  Video Visit     Originating Location (pt. Location): Home    Distant Location (provider location):  On-site  Platform used for Video Visit: [facility] Medical Weight Management Note     [place] Arredondo  MRN:  [id number]  :  1960  JUSTICE:  2023    Dear Marybel Aguiar MD,    I had the pleasure of seeing your patient [place] Arredondo. She is a 62 year old female who I am continuing to see for treatment of obesity related to:        2022     4:38 PM   --   I have the following health issues associated with obesity Pre-Diabetes    High Cholesterol    Sleep Apnea    Stress Incontinence   I have the following symptoms associated with obesity Knee Pain    Depression    Back Pain    Fatigue       Assessment & Plan   Problem List Items Addressed This Visit    None  Visit Diagnoses       Class 2 severe obesity with serious comorbidity and body mass index (BMI) of 35.0 to 35.9 in adult, unspecified obesity type (H)        Relevant Medications    Semaglutide-Weight Management (WEGOVY) 2.4 MG/0.75ML pen    Pre-diabetes        Relevant Medications    Semaglutide-Weight Management (WEGOVY) 2.4 MG/0.75ML pen           Continue Wegovy 2.4mg once weekly. Refills sent.   Activity goal - join the gym   Follow up with Grisel Mathias in 3 months     INTERVAL HISTORY:  New MWM - 2022 - Started Saxenda  2023 - wanted to continue Saxenda 3.0mg once daily.   Patient reached out via [employer] 2023 that Saxenda was no longer helpful with hunger, and would like to transition to Wegovy   Transitioned to Wegovy 1.7mg once weekly    2/15/2023 - continued Wegovy 1.7mg once weekly   Reached out via [employer] 2/28/2023 and increase to Wegovy 2.4mg   Last seen 4/13/23 - Continued Wegovy 2.4mg     Weight loss has slowed down a little. Still very happy about results.      Anti-obesity medications:     Current:   Wegovy 2.4mg - No side effects. Still helping with hunger.     Failed/contraindicated:   - Phentermine - previously tried. Side effects of insomnia and increased anxiety.   - Topiramate - concern for brain fog   - Naltrexone - currently talking opioids as needed for chronic pain    Recent diet changes: Eating 2 meals a day. Drinking 64oz of water daily. Increase vegetables.     Recent exercise/activity changes: Has been working out in the garden a lot. Goal to get a gym membership in august.  Just joined [employer].     Recent stressors: Increased Prozac by PCP, but then self tapered medication. Feeling a lot better with stopping medication. Is retiring in the December.    Recent sleep changes: No concerns     Vitamins/Labs: Taking Vitamin D 2000IU every other day.     CURRENT WEIGHT:   207 lbs 0 oz    Initial Weight (lbs): 242 lbs  Last Visits Weight: 98.9 kg (218 lb)  Cumulative weight loss (lbs): 35  Weight Loss Percentage: 14.46%     Wt Readings from Last 5 Encounters:   07/20/23 93.9 kg (207 lb)   04/14/23 99.7 kg (219 lb 12.8 oz)   04/13/23 98.9 kg (218 lb)   03/21/23 103 kg (227 lb)   02/14/23 103 kg (227 lb)             7/19/2023     1:10 PM   Changes and Difficulties   I have made the following changes to my diet since my last visit: Wating about the same. Healthy smaller portions         MEDICATIONS:   Current Outpatient Medications   Medication Sig Dispense Refill    Semaglutide-Weight Management (WEGOVY) 2.4 MG/0.75ML pen Inject 2.4 mg Subcutaneous once a week 3 mL 3    acetaminophen (TYLENOL) 650 MG CR tablet Take 1,300 mg by mouth every 8 hours as needed       albuterol (PROAIR HFA/PROVENTIL HFA/VENTOLIN HFA) 108 (90 Base)  "MCG/ACT inhaler Inhale 2 puffs into the lungs every 6 hours as needed for shortness of breath, wheezing or cough 18 g 0    benzonatate (TESSALON) 200 MG capsule Take 1 capsule (200 mg) by mouth 3 times daily as needed for cough 30 capsule 0    CALCIUM + D OR 1 TABLET DAILY      HYDROcodone-acetaminophen (NORCO)  MG per tablet TAKE 1 TABLET BY MOUTH EVERY 6 HOURS AS NEEDED FOR SEVERE PAIN (7-10) 30 tablet 0    ibuprofen 200 MG capsule Take 200 mg by mouth every 4 hours as needed for fever      meloxicam (MOBIC) 7.5 MG tablet Take 1 tablet (7.5 mg) by mouth daily 90 tablet 3    methocarbamol (ROBAXIN) 750 MG tablet TAKE ONE TO ONE AND ONE-HALF TABLETS BY MOUTH THREE TIMES A DAY AS NEEDED FOR MUSCLE SPASMS 45 tablet 1    MULTIVITAMINS OR 1 daily       omeprazole (PRILOSEC) 20 MG DR capsule Take 1 capsule (20 mg) by mouth daily 90 capsule 4    oxybutynin ER (DITROPAN XL) 15 MG 24 hr tablet Take 1 tablet (15 mg) by mouth daily 90 tablet 4    Vitamin D3 (CHOLECALCIFEROL) 125 MCG (5000 UT) tablet 6000 UNITs             7/19/2023     1:10 PM   Weight Loss Medication History Reviewed With Patient   Which weight loss medications are you currently taking on a regular basis? Wegovy   Are you having any side effects from the weight loss medication that we have prescribed you? No           Objective    Ht 1.702 m (5' 7\")   Wt 93.9 kg (207 lb)   LMP 03/06/2011   BMI 32.42 kg/m             Vitals:  No vitals were obtained today due to virtual visit.    PHYSICAL EXAM:  GENERAL: Healthy, alert and no distress  EYES: Eyes grossly normal to inspection.  No discharge or erythema, or obvious scleral/conjunctival abnormalities.  RESP: No audible wheeze, cough, or visible cyanosis.  No visible retractions or increased work of breathing.    SKIN: Visible skin clear. No significant rash, abnormal pigmentation or lesions.  NEURO: Cranial nerves grossly intact.  Mentation and speech appropriate for age.  PSYCH: Mentation appears " normal, affect normal/bright, judgement and insight intact, normal speech and appearance well-groomed.        Sincerely,    MYRA GOLD PA-C      30 minutes spent by me on the date of the encounter doing chart review, history and exam, documentation and further activities per the note   101F

## 2023-07-20 NOTE — PROGRESS NOTES
Virtual Visit Details    Type of service:  Video Visit     Originating Location (pt. Location): Home    Distant Location (provider location):  On-site  Platform used for Video Visit: [facility] Medical Weight Management Note     [place] Arredondo  MRN:  [id number]  :  1960  JUSTICE:  2023    Dear Marybel Aguiar MD,    I had the pleasure of seeing your patient [place] Arredondo. She is a 62 year old female who I am continuing to see for treatment of obesity related to:        2022     4:38 PM   --   I have the following health issues associated with obesity Pre-Diabetes    High Cholesterol    Sleep Apnea    Stress Incontinence   I have the following symptoms associated with obesity Knee Pain    Depression    Back Pain    Fatigue       Assessment & Plan   Problem List Items Addressed This Visit    None  Visit Diagnoses     Class 2 severe obesity with serious comorbidity and body mass index (BMI) of 35.0 to 35.9 in adult, unspecified obesity type (H)        Relevant Medications    Semaglutide-Weight Management (WEGOVY) 2.4 MG/0.75ML pen    Pre-diabetes        Relevant Medications    Semaglutide-Weight Management (WEGOVY) 2.4 MG/0.75ML pen         1. Continue Wegovy 2.4mg once weekly. Refills sent.   2. Activity goal - join the gym   3. Follow up with Grisel Mathias in 3 months     INTERVAL HISTORY:  New MWM - 2022 - Started Saxenda  2023 - wanted to continue Saxenda 3.0mg once daily.   Patient reached out via [employer] 2023 that Saxenda was no longer helpful with hunger, and would like to transition to Wegovy   Transitioned to Wegovy 1.7mg once weekly   2/15/2023 - continued Wegovy 1.7mg once weekly   Reached out via [employer] 2023 and increase to Wegovy 2.4mg   Last seen 23 - Continued Wegovy 2.4mg     Weight loss has slowed down a little. Still very happy about results.      Anti-obesity medications:     Current:   Wegovy 2.4mg - No side effects. Still helping with  hunger.     Failed/contraindicated:   - Phentermine - previously tried. Side effects of insomnia and increased anxiety.   - Topiramate - concern for brain fog   - Naltrexone - currently talking opioids as needed for chronic pain    Recent diet changes: Eating 2 meals a day. Drinking 64oz of water daily. Increase vegetables.     Recent exercise/activity changes: Has been working out in the garden a lot. Goal to get a gym membership in august.  Just joined [employer].     Recent stressors: Increased Prozac by PCP, but then self tapered medication. Feeling a lot better with stopping medication. Is retiring in the December.    Recent sleep changes: No concerns     Vitamins/Labs: Taking Vitamin D 2000IU every other day.     CURRENT WEIGHT:   207 lbs 0 oz    Initial Weight (lbs): 242 lbs  Last Visits Weight: 98.9 kg (218 lb)  Cumulative weight loss (lbs): 35  Weight Loss Percentage: 14.46%     Wt Readings from Last 5 Encounters:   07/20/23 93.9 kg (207 lb)   04/14/23 99.7 kg (219 lb 12.8 oz)   04/13/23 98.9 kg (218 lb)   03/21/23 103 kg (227 lb)   02/14/23 103 kg (227 lb)             7/19/2023     1:10 PM   Changes and Difficulties   I have made the following changes to my diet since my last visit: Wating about the same. Healthy smaller portions         MEDICATIONS:   Current Outpatient Medications   Medication Sig Dispense Refill     Semaglutide-Weight Management (WEGOVY) 2.4 MG/0.75ML pen Inject 2.4 mg Subcutaneous once a week 3 mL 3     acetaminophen (TYLENOL) 650 MG CR tablet Take 1,300 mg by mouth every 8 hours as needed        albuterol (PROAIR HFA/PROVENTIL HFA/VENTOLIN HFA) 108 (90 Base) MCG/ACT inhaler Inhale 2 puffs into the lungs every 6 hours as needed for shortness of breath, wheezing or cough 18 g 0     benzonatate (TESSALON) 200 MG capsule Take 1 capsule (200 mg) by mouth 3 times daily as needed for cough 30 capsule 0     CALCIUM + D OR 1 TABLET DAILY       HYDROcodone-acetaminophen (NORCO)  MG per  "tablet TAKE 1 TABLET BY MOUTH EVERY 6 HOURS AS NEEDED FOR SEVERE PAIN (7-10) 30 tablet 0     ibuprofen 200 MG capsule Take 200 mg by mouth every 4 hours as needed for fever       meloxicam (MOBIC) 7.5 MG tablet Take 1 tablet (7.5 mg) by mouth daily 90 tablet 3     methocarbamol (ROBAXIN) 750 MG tablet TAKE ONE TO ONE AND ONE-HALF TABLETS BY MOUTH THREE TIMES A DAY AS NEEDED FOR MUSCLE SPASMS 45 tablet 1     MULTIVITAMINS OR 1 daily        omeprazole (PRILOSEC) 20 MG DR capsule Take 1 capsule (20 mg) by mouth daily 90 capsule 4     oxybutynin ER (DITROPAN XL) 15 MG 24 hr tablet Take 1 tablet (15 mg) by mouth daily 90 tablet 4     Vitamin D3 (CHOLECALCIFEROL) 125 MCG (5000 UT) tablet 6000 UNITs             7/19/2023     1:10 PM   Weight Loss Medication History Reviewed With Patient   Which weight loss medications are you currently taking on a regular basis? Wegovy   Are you having any side effects from the weight loss medication that we have prescribed you? No           Objective    Ht 1.702 m (5' 7\")   Wt 93.9 kg (207 lb)   LMP 03/06/2011   BMI 32.42 kg/m             Vitals:  No vitals were obtained today due to virtual visit.    PHYSICAL EXAM:  GENERAL: Healthy, alert and no distress  EYES: Eyes grossly normal to inspection.  No discharge or erythema, or obvious scleral/conjunctival abnormalities.  RESP: No audible wheeze, cough, or visible cyanosis.  No visible retractions or increased work of breathing.    SKIN: Visible skin clear. No significant rash, abnormal pigmentation or lesions.  NEURO: Cranial nerves grossly intact.  Mentation and speech appropriate for age.  PSYCH: Mentation appears normal, affect normal/bright, judgement and insight intact, normal speech and appearance well-groomed.        Sincerely,    MYRA GOLD PA-C      30 minutes spent by me on the date of the encounter doing chart review, history and exam, documentation and further activities per the note  "

## 2023-07-20 NOTE — PROGRESS NOTES
"Virtual Visit Details    Type of service:  Video Visit     Originating Location (pt. Location): {video visit patient location:519650::\"Home\"}  {PROVIDER LOCATION On-site should be selected for visits conducted from your clinic location or adjoining [facility] hospital, academic office, or other nearby [facility] building. Off-site should be selected for all other provider locations, including home:773877}  Distant Location (provider location):  {virtual location provider:554253}  Platform used for Video Visit: {Virtual Visit Platforms:474928::\"Cosential\"}  "

## 2023-07-23 NOTE — PATIENT INSTRUCTIONS
"Cole Montero,   Thank you for allowing us the privilege of caring for you. We hope we provided you with the excellent service you deserve.   Please let us know if there is anything else we can do for you so that we can be sure you are completely satisfied with your care experience.    To ensure the quality of our services you may be receiving a patient satisfaction survey from an independent patient satisfaction monitoring company.    The greatest compliment you can give is a \"Likely to Recommend\"    Your visit was with MYRA GOLD PA-C today.    Instructions per today's visit:     Continue Wegovy 2.4mg once weekly. Refills sent.   Activity goal - join the gym   Follow up with Myra Mathias in 3 months     ___________________________________________________________________________  Important contact and scheduling information:  Please call our contact center at [phone] to schedule your next appointments.  For any nursing questions or concerns call Mary Salazar LPN at [phone] or Miguelina Gan RN at [phone]  Please call during clinic hours Monday through Friday 8:00a - 4:00p if you have questions or you can contact us via [employer] at anytime and we will reply during clinic hours.    Lab results will be communicated through My Chart or letter (if My Chart not used). Please call the clinic if you have not received communication after 1 week or if you have any questions.?  Clinic Fax: [phone]  __________________________________________________________________________    If labs were ordered today:    Please make an appointment to have them drawn at your convenience.     To schedule the Lab Appointment using [employer]:  Select \"Schedule an Appointment\"  Select \"Lab Only\"  For \"A couple of questions\", select \"Other\"  For \"Which locations work for you?, select the location and set up the appointment    To schedule by phone call [phone] to schedule a lab only appointment at [facility] " lab.  ___________________________________________________________________________  Work with A Health !  Virtual Sessions are Available through [facility] Weight Management Clinics    To learn more, call to schedule a free, Health  Q&A appointment: [phone]     What is Health Coaching?  Do you know what you are supposed to do, but you just aren't doing it?  Then, HEALTH COACHING may help you!   Get unstuck and move forward with the support of a professionally trained NBC-HWC (National Board-Certified Health and ) who uses evidence-based approaches to help you move forward with healthy lifestyle changes in the areas of weight loss, stress management and overall well-being.    Health Coaches help you identify goals that will work best for you. Health Coaches provide support and encouragement with overcoming barriers and help you to find inspiration and motivation to lead a healthy lifestyle.    Option one:  Health Coaching 3-Pack; Three, 30-minute Health Coaching Visits, for $99  Visits are done virtually (phone or video)  This is a self pay service; we do not accept insurance for terri coaching.    Option two:   The 24 week Plan; 11 Health Coaching Visits, and a 7 months subscription to [employer]-- on-demand fitness, nutrition and mindfulness classes, for $499 (employee discounts may be available). Participants will also meet regularly with a weight management Medical Provider and a Registered/Licensed Dietician.  This is a self-pay service; we do not accept insurance for health coaching.    To Schedule a free Health  Q&A appointment to learn more,  call [phone].  ____________________________________________________________________    [facility]   Healthy Lifestyle Virtual Support Group    Healthy Lifestyle Virtual Support Group?  This is 60 minutes of small group guided discussion, support and resources. All are welcome who  want a healthy lifestyle.  WHEN: Starting in July 2023, this group meets the 1st Friday of the month from 12:30 PM - 1:30 PM virtually using Microsoft Teams.    FACILITATOR: Led by National Board Certified Health and , Cecilia De La Rosa [facility], [facility].   TO REGISTER: Please send an email to Cecilia [email or web address] to receive monthly invites to the group or if you have any questions about having a health .  Prior to the meeting, a link with directions on how to join the meeting will be sent to you.    2023 Meetings  May 19: Let's Talk  June 9: Create Your Coaching Toolkit: Learn How to  Yourself  July 7: Let's Talk  August 4: Benefits of Fiber with MADDISON Ramos  September 1: Show and Tell (share your aps, podcasts, recipes, hacks, books)  October 6 :Let's Talk  November 3: Introduction to Mindfulness   December 1: Let's Talk    If you would like bariatric surgery specific support group info please let your care team know.         Thank you,   [facility] [place] Comprehensive Weight Management Team

## 2023-08-16 ENCOUNTER — TELEPHONE (OUTPATIENT)
Dept: ENDOCRINOLOGY | Facility: CLINIC | Age: 63
End: 2023-08-16
Payer: COMMERCIAL

## 2023-08-16 NOTE — TELEPHONE ENCOUNTER
Prior Authorization Approval    Medication: WEGOVY 2.4 MG/0.75ML SC SOAJ  Authorization Effective Date: 8/16/2023  Authorization Expiration Date: 8/16/2024  Approved Dose/Quantity: 3 ml per 28 days  Reference #: [id number]   Insurance Company: [employer] - Phone [phone] Fax [phone]  Expected CoPay:       CoPay Card Available:      Financial Assistance Needed:   Which Pharmacy is filling the prescription: [facility] [id number] [place] [address]  Pharmacy Notified: No - renewal only  Patient Notified: No  - renewal only             Thank you,     Kimo Moscoso [facility]  [email or web address]   Phone: [phone]  Fax: [phone]

## 2023-08-16 NOTE — TELEPHONE ENCOUNTER
PA Initiation    Medication: WEGOVY 2.4 MG/0.75ML SC SOAJ  Insurance Company: [employer] - Phone [phone] Fax [phone]  Pharmacy Filling the Rx: [facility] [place], [place] - [address]  Filling Pharmacy Phone: [phone]  Filling Pharmacy Fax: [phone]  Start Date: 8/16/2023           Thank you,     Kimo Moscoso [facility]  Pharmacy Clinic [facility]  [email or web address]   Phone: [phone]  Fax: [phone]

## 2023-10-06 ENCOUNTER — LAB (OUTPATIENT)
Dept: FAMILY MEDICINE | Facility: CLINIC | Age: 63
End: 2023-10-06

## 2023-10-06 ENCOUNTER — OFFICE VISIT (OUTPATIENT)
Dept: FAMILY MEDICINE | Facility: CLINIC | Age: 63
End: 2023-10-06
Payer: COMMERCIAL

## 2023-10-06 ENCOUNTER — LAB (OUTPATIENT)
Dept: LAB | Facility: CLINIC | Age: 63
End: 2023-10-06
Payer: COMMERCIAL

## 2023-10-06 VITALS
BODY MASS INDEX: 32.49 KG/M2 | TEMPERATURE: 98.1 F | RESPIRATION RATE: 16 BRPM | HEART RATE: 67 BPM | OXYGEN SATURATION: 98 % | DIASTOLIC BLOOD PRESSURE: 72 MMHG | HEIGHT: 67 IN | WEIGHT: 207 LBS | SYSTOLIC BLOOD PRESSURE: 110 MMHG

## 2023-10-06 DIAGNOSIS — Z13.29 SCREENING FOR ENDOCRINE, METABOLIC AND IMMUNITY DISORDER: ICD-10-CM

## 2023-10-06 DIAGNOSIS — N39.3 URINARY, INCONTINENCE, STRESS FEMALE: ICD-10-CM

## 2023-10-06 DIAGNOSIS — Z00.00 WELL ADULT EXAM: Primary | ICD-10-CM

## 2023-10-06 DIAGNOSIS — Z87.891 PERSONAL HISTORY OF TOBACCO USE: ICD-10-CM

## 2023-10-06 DIAGNOSIS — K21.00 GASTROESOPHAGEAL REFLUX DISEASE WITH ESOPHAGITIS, UNSPECIFIED WHETHER HEMORRHAGE: ICD-10-CM

## 2023-10-06 DIAGNOSIS — Z13.228 SCREENING FOR ENDOCRINE, METABOLIC AND IMMUNITY DISORDER: ICD-10-CM

## 2023-10-06 DIAGNOSIS — M17.11 PRIMARY OSTEOARTHRITIS OF RIGHT KNEE: ICD-10-CM

## 2023-10-06 DIAGNOSIS — Z12.11 SCREEN FOR COLON CANCER: ICD-10-CM

## 2023-10-06 DIAGNOSIS — G89.29 CHRONIC LOW BACK PAIN WITHOUT SCIATICA, UNSPECIFIED BACK PAIN LATERALITY: ICD-10-CM

## 2023-10-06 DIAGNOSIS — Z13.220 LIPID SCREENING: ICD-10-CM

## 2023-10-06 DIAGNOSIS — F33.0 MILD EPISODE OF RECURRENT MAJOR DEPRESSIVE DISORDER (H): ICD-10-CM

## 2023-10-06 DIAGNOSIS — Z13.0 SCREENING FOR ENDOCRINE, METABOLIC AND IMMUNITY DISORDER: ICD-10-CM

## 2023-10-06 DIAGNOSIS — F11.90 CHRONIC, CONTINUOUS USE OF OPIOIDS: ICD-10-CM

## 2023-10-06 DIAGNOSIS — H91.93 BILATERAL HEARING LOSS, UNSPECIFIED HEARING LOSS TYPE: ICD-10-CM

## 2023-10-06 DIAGNOSIS — M54.50 CHRONIC LOW BACK PAIN WITHOUT SCIATICA, UNSPECIFIED BACK PAIN LATERALITY: ICD-10-CM

## 2023-10-06 LAB
ANION GAP SERPL CALCULATED.3IONS-SCNC: 11 MMOL/L (ref 7–15)
BUN SERPL-MCNC: 32.8 MG/DL (ref 8–23)
CALCIUM SERPL-MCNC: 10.3 MG/DL (ref 8.8–10.2)
CHLORIDE SERPL-SCNC: 102 MMOL/L (ref 98–107)
CHOLEST SERPL-MCNC: 222 MG/DL
CREAT SERPL-MCNC: 1.03 MG/DL (ref 0.51–0.95)
CREAT UR-MCNC: 110 MG/DL
DEPRECATED HCO3 PLAS-SCNC: 25 MMOL/L (ref 22–29)
EGFRCR SERPLBLD CKD-EPI 2021: 62 ML/MIN/1.73M2
GLUCOSE SERPL-MCNC: 104 MG/DL (ref 70–99)
HDLC SERPL-MCNC: 57 MG/DL
LDLC SERPL CALC-MCNC: 138 MG/DL
NONHDLC SERPL-MCNC: 165 MG/DL
POTASSIUM SERPL-SCNC: 4.5 MMOL/L (ref 3.4–5.3)
SODIUM SERPL-SCNC: 138 MMOL/L (ref 135–145)
TRIGL SERPL-MCNC: 135 MG/DL
TSH SERPL DL<=0.005 MIU/L-ACNC: 1.26 UIU/ML (ref 0.3–4.2)

## 2023-10-06 PROCEDURE — 80048 BASIC METABOLIC PNL TOTAL CA: CPT

## 2023-10-06 PROCEDURE — 99396 PREV VISIT EST AGE 40-64: CPT | Mod: 25 | Performed by: FAMILY MEDICINE

## 2023-10-06 PROCEDURE — 90682 RIV4 VACC RECOMBINANT DNA IM: CPT | Performed by: FAMILY MEDICINE

## 2023-10-06 PROCEDURE — 99214 OFFICE O/P EST MOD 30 MIN: CPT | Mod: 25 | Performed by: FAMILY MEDICINE

## 2023-10-06 PROCEDURE — G0296 VISIT TO DETERM LDCT ELIG: HCPCS | Performed by: FAMILY MEDICINE

## 2023-10-06 PROCEDURE — 80061 LIPID PANEL: CPT

## 2023-10-06 PROCEDURE — 90471 IMMUNIZATION ADMIN: CPT | Performed by: FAMILY MEDICINE

## 2023-10-06 PROCEDURE — 84443 ASSAY THYROID STIM HORMONE: CPT

## 2023-10-06 PROCEDURE — 36415 COLL VENOUS BLD VENIPUNCTURE: CPT

## 2023-10-06 PROCEDURE — G0480 DRUG TEST DEF 1-7 CLASSES: HCPCS | Performed by: FAMILY MEDICINE

## 2023-10-06 PROCEDURE — 91320 SARSCV2 VAC 30MCG TRS-SUC IM: CPT | Performed by: FAMILY MEDICINE

## 2023-10-06 PROCEDURE — 90480 ADMN SARSCOV2 VAC 1/ONLY CMP: CPT | Performed by: FAMILY MEDICINE

## 2023-10-06 RX ORDER — MELOXICAM 7.5 MG/1
7.5 TABLET ORAL DAILY
Qty: 90 TABLET | Refills: 3 | Status: SHIPPED | OUTPATIENT
Start: 2023-10-06

## 2023-10-06 RX ORDER — OXYBUTYNIN CHLORIDE 15 MG/1
15 TABLET, EXTENDED RELEASE ORAL DAILY
Qty: 90 TABLET | Refills: 4 | Status: SHIPPED | OUTPATIENT
Start: 2023-10-06

## 2023-10-06 RX ORDER — BIOTIN 1 MG
1000 TABLET ORAL DAILY
COMMUNITY
End: 2024-05-03

## 2023-10-06 ASSESSMENT — ENCOUNTER SYMPTOMS
BREAST MASS: 0
DIARRHEA: 0
EYE PAIN: 0
FEVER: 0
WEAKNESS: 0
FREQUENCY: 0
HEMATURIA: 0
PARESTHESIAS: 0
JOINT SWELLING: 0
NERVOUS/ANXIOUS: 0
NAUSEA: 0
HEARTBURN: 0
HEADACHES: 0
CHILLS: 0
MYALGIAS: 1
ABDOMINAL PAIN: 0
ARTHRALGIAS: 0
CONSTIPATION: 0
COUGH: 0
HEMATOCHEZIA: 0
DIZZINESS: 0
SHORTNESS OF BREATH: 0
PALPITATIONS: 0
SORE THROAT: 0
DYSURIA: 0

## 2023-10-06 ASSESSMENT — ANXIETY QUESTIONNAIRES
IF YOU CHECKED OFF ANY PROBLEMS ON THIS QUESTIONNAIRE, HOW DIFFICULT HAVE THESE PROBLEMS MADE IT FOR YOU TO DO YOUR WORK, TAKE CARE OF THINGS AT HOME, OR GET ALONG WITH OTHER PEOPLE: NOT DIFFICULT AT ALL
1. FEELING NERVOUS, ANXIOUS, OR ON EDGE: NOT AT ALL
GAD7 TOTAL SCORE: 0
6. BECOMING EASILY ANNOYED OR IRRITABLE: NOT AT ALL
3. WORRYING TOO MUCH ABOUT DIFFERENT THINGS: NOT AT ALL
GAD7 TOTAL SCORE: 0
5. BEING SO RESTLESS THAT IT IS HARD TO SIT STILL: NOT AT ALL
7. FEELING AFRAID AS IF SOMETHING AWFUL MIGHT HAPPEN: NOT AT ALL
2. NOT BEING ABLE TO STOP OR CONTROL WORRYING: NOT AT ALL

## 2023-10-06 ASSESSMENT — PATIENT HEALTH QUESTIONNAIRE - PHQ9
5. POOR APPETITE OR OVEREATING: NOT AT ALL
10. IF YOU CHECKED OFF ANY PROBLEMS, HOW DIFFICULT HAVE THESE PROBLEMS MADE IT FOR YOU TO DO YOUR WORK, TAKE CARE OF THINGS AT HOME, OR GET ALONG WITH OTHER PEOPLE: NOT DIFFICULT AT ALL
SUM OF ALL RESPONSES TO PHQ QUESTIONS 1-9: 0
SUM OF ALL RESPONSES TO PHQ QUESTIONS 1-9: 0

## 2023-10-06 ASSESSMENT — PAIN SCALES - GENERAL: PAINLEVEL: NO PAIN (0)

## 2023-10-06 NOTE — NURSING NOTE
"Initial /72   Pulse 67   Temp 98.1  F (36.7  C) (Tympanic)   Resp 16   Ht 1.702 m (5' 7\")   Wt 93.9 kg (207 lb)   LMP 03/06/2011   SpO2 98%   BMI 32.42 kg/m   Estimated body mass index is 32.42 kg/m  as calculated from the following:    Height as of this encounter: 1.702 m (5' 7\").    Weight as of this encounter: 93.9 kg (207 lb). .    "

## 2023-10-06 NOTE — PROGRESS NOTES
Lung Cancer Screening Shared Decision Making Visit     [place] Arredondo, a 62 year old female, is eligible for lung cancer screening    History   Smoking Status    Former    Packs/day: 0.50    Years: 20.00    Types: Cigarettes    Quit date: 1/1/2016   Smokeless Tobacco    Never       I have discussed with patient the risks and benefits of screening for lung cancer with low-dose CT.     The risks include:    radiation exposure: one low dose chest CT has as much ionizing radiation as about 15 chest x-rays, or 6 months of background radiation living in [place]      false positives: most findings/nodules are NOT cancer, but some might still require additional diagnostic evaluation, including biopsy    over-diagnosis: some slow growing cancers that might never have been clinically significant will be detected and treated unnecessarily     The benefit of early detection of lung cancer is contingent upon adherence to annual screening or more frequent follow up if indicated.     Furthermore, to benefit from screening, [place] must be willing and able to undergo diagnostic procedures, if indicated. Although no specific guide is available for determining severity of comorbidities, it is reasonable to withhold screening in patients who have greater mortality risk from other diseases.     We did discuss that the best way to prevent lung cancer is to not smoke.    Some patients may value a numeric estimation of lung cancer risk when evaluating if lung cancer screening is right for them, here is one calculator:    ShouldIScreenAnswers submitted by the patient for this visit:  Patient Health Questionnaire (Submitted on 10/6/2023)  If you checked off any problems, how difficult have these problems made it for you to do your work, take care of things at home, or get along with other people?: Not difficult at all  PHQ9 TOTAL SCORE: 0  Annual Preventive Visit (Submitted on 10/6/2023)  Chief Complaint: Annual Exam:  Frequency of  exercise:: 2-3 days/week  Getting at least 3 servings of Calcium per day:: Yes  Diet:: Regular (no restrictions)  Taking medications regularly:: Yes  Medication side effects:: None  Bi-annual eye exam:: Yes  Dental care twice a year:: Yes  Sleep apnea or symptoms of sleep apnea:: None  abdominal pain: No  Blood in stool: No  Blood in urine: No  chest pain: No  chills: No  congestion: No  constipation: No  cough: No  diarrhea: No  dizziness: No  ear pain: No  eye pain: No  nervous/anxious: No  fever: No  frequency: No  genital sores: No  headaches: No  hearing loss: Yes  heartburn: No  arthralgias: No  joint swelling: No  peripheral edema: No  mood changes: No  myalgias: Yes  nausea: No  dysuria: No  palpitations: No  Skin sensation changes: No  sore throat: No  urgency: Yes  rash: No  shortness of breath: No  visual disturbance: No  weakness: No  pelvic pain: No  vaginal bleeding: No  vaginal discharge: No  tenderness: No  breast mass: No  breast discharge: No  Additional concerns today:: No  Exercise outside of work (Submitted on 10/6/2023)  Chief Complaint: Annual Exam:  Duration of exercise:: 30-45 minutes

## 2023-10-06 NOTE — PROGRESS NOTES
SUBJECTIVE:   CC: Charlotte Montero is an 62 year old who presents for preventive health visit.       2023     4:00 PM   Additional Questions   Roomed by Oriana DOAN       Healthy Habits:     Getting at least 3 servings of Calcium per day:  Yes    Bi-annual eye exam:  Yes    Dental care twice a year:  Yes    Sleep apnea or symptoms of sleep apnea:  None    Diet:  Regular (no restrictions)    Frequency of exercise:  2-3 days/week    Duration of exercise:  30-45 minutes    Taking medications regularly:  Yes    Medication side effects:  None    Additional concerns today:  No      Today's PHQ-9 Score:       10/6/2023     7:25 AM   PHQ-9 SCORE   PHQ-9 Total Score MyChart 0   PHQ-9 Total Score 0                       Social History     Tobacco Use    Smoking status: Former     Packs/day: 0.50     Years: 20.00     Pack years: 10.00     Types: Cigarettes     Quit date: 2016     Years since quittin.7    Smokeless tobacco: Never    Tobacco comments:     using E-cig   Substance Use Topics    Alcohol use: No             10/6/2023     7:27 AM   Alcohol Use   Prescreen: >3 drinks/day or >7 drinks/week? No     Reviewed orders with patient.  Reviewed health maintenance and updated orders accordingly - Yes  Labs reviewed in EPIC  Patient Active Problem List   Diagnosis    Chronic low back pain    NICOLETTE (obstructive sleep apnea)-Mild (AHI 6)    CARDIOVASCULAR SCREENING; LDL GOAL LESS THAN 130    Major depression in complete remission (H)    BMI 38.0-38.9,adult    Anxiety    Mixed stress and urge urinary incontinence    Controlled substance agreement signed    Advanced directives, counseling/discussion    Class 2 severe obesity with serious comorbidity and body mass index (BMI) of 37.0 to 37.9 in adult (H)    Cervical high risk HPV (human papillomavirus) test positive    Stenosis of cervix uteri    Major depression in complete remission (H24)    Uncomplicated opioid dependence (H)    Gastroesophageal reflux disease with esophagitis,  unspecified whether hemorrhage    Urinary, incontinence, stress female     Past Surgical History:   Procedure Laterality Date    COLONOSCOPY  10/22/2012    Procedure: COLONOSCOPY;  Colonoscopy;  Surgeon: Robert Encinas MD;  Location: [facility]    CONIZATION LEEP      ENT SURGERY      LAPAROSCOPIC CHOLECYSTECTOMY  10/21/2010    LAPAROSCOPIC CHOLECYSTECTOMY performed by DAMIR WING at [facility]    SURGICAL HISTORY OF -       Rt wrist surgery    SURGICAL HISTORY OF -       breast augmentation    SURGICAL HISTORY OF -   10/15/2010    EUS - GB bulding w/sludge, Nl bile duct w/no choledocholithiasis or sludge.     SURGICAL HISTORY OF -   2006    1.  Left shoulder arthroscopy with arthroscopic subacromial decompression.     TONSILLECTOMY & ADENOIDECTOMY  as child    tonsils and adenoids       Social History     Tobacco Use    Smoking status: Former     Packs/day: 0.50     Years: 20.00     Pack years: 10.00     Types: Cigarettes     Quit date: 2016     Years since quittin.7    Smokeless tobacco: Never    Tobacco comments:     using E-cig   Substance Use Topics    Alcohol use: No     Family History   Problem Relation Age of Onset    Diabetes Father         type 2    C.A.D. Father     Heart Disease Father         had 2 heart attacks,68 for first one 79 on second    Cancer - colorectal Paternal Grandfather     Diabetes Sister         type 2         Current Outpatient Medications   Medication Sig Dispense Refill    acetaminophen (TYLENOL) 650 MG CR tablet Take 1,300 mg by mouth every 8 hours as needed       biotin 1000 MCG TABS tablet Take 1,000 mcg by mouth daily      CALCIUM + D OR 1 TABLET DAILY      FLUoxetine (PROZAC) 20 MG capsule Take 1 capsule (20 mg) by mouth daily 90 capsule 4    HYDROcodone-acetaminophen (NORCO)  MG per tablet TAKE 1 TABLET BY MOUTH EVERY 6 HOURS AS NEEDED FOR SEVERE PAIN (7-10) 30 tablet 0    ibuprofen 200 MG capsule Take 200 mg by mouth every 4 hours as needed for  fever      meloxicam (MOBIC) 7.5 MG tablet Take 1 tablet (7.5 mg) by mouth daily 90 tablet 3    methocarbamol (ROBAXIN) 750 MG tablet TAKE ONE TO ONE AND ONE-HALF TABLETS BY MOUTH THREE TIMES A DAY AS NEEDED FOR MUSCLE SPASMS 45 tablet 1    MULTIVITAMINS OR 1 daily       omeprazole (PRILOSEC) 20 MG DR capsule Take 1 capsule (20 mg) by mouth daily 90 capsule 4    oxyBUTYnin ER (DITROPAN XL) 15 MG 24 hr tablet Take 1 tablet (15 mg) by mouth daily 90 tablet 4    Semaglutide-Weight Management (WEGOVY) 2.4 MG/0.75ML pen Inject 2.4 mg Subcutaneous once a week 3 mL 3    Vitamin D3 (CHOLECALCIFEROL) 125 MCG (5000 UT) tablet 6000 UNITs       Allergies   Allergen Reactions    Nkda [No Known Drug Allergy]        Breast Cancer Screenin/28/2022     7:17 AM   Breast CA Risk Assessment (FHS-7)   Do you have a family history of breast, colon, or ovarian cancer? No / Unknown         Mammogram Screening: Recommended mammography every 1-2 years with patient discussion and risk factor consideration  Pertinent mammograms are reviewed under the imaging tab.    History of abnormal Pap smear: YES - updated in Problem List and Health Maintenance accordingly      Latest Ref Rng & Units 3/21/2023     4:01 PM 2022     8:00 AM 2021     4:19 PM   PAP / HPV   PAP  Negative for Intraepithelial Lesion or Malignancy (NILM)  Negative for Intraepithelial Lesion or Malignancy (NILM)     HPV 16 DNA Negative Negative  Negative  Negative    HPV 18 DNA Negative Positive  Positive  Positive    Other HR HPV Negative Negative  Negative  Negative      Reviewed and updated as needed this visit by clinical staff   Tobacco  Allergies  Meds  Problems  Med Hx  Surg Hx  Fam Hx          Reviewed and updated as needed this visit by Provider   Tobacco  Allergies  Meds  Problems  Med Hx  Surg Hx  Fam Hx         Past Medical History:   Diagnosis Date    Basal cell carcinoma     Cervical high risk HPV (human papillomavirus) test positive  02/18/2020 4/23/21    Neoplasm of uncertain behavior of skin 03/13/2012    Tobacco use disorder 11/30/2005    Quit October 2007      Past Surgical History:   Procedure Laterality Date    COLONOSCOPY  10/22/2012    Procedure: COLONOSCOPY;  Colonoscopy;  Surgeon: Robert Encinas MD;  Location: [facility]    CONIZATION LEEP  1983    ENT SURGERY      LAPAROSCOPIC CHOLECYSTECTOMY  10/21/2010    LAPAROSCOPIC CHOLECYSTECTOMY performed by DAMIR WING at [facility]    SURGICAL HISTORY OF -   1973    Rt wrist surgery    SURGICAL HISTORY OF -   2004    breast augmentation    SURGICAL HISTORY OF -   10/15/2010    EUS - GB bulding w/sludge, Nl bile duct w/no choledocholithiasis or sludge.     SURGICAL HISTORY OF -   02/09/2006    1.  Left shoulder arthroscopy with arthroscopic subacromial decompression.     TONSILLECTOMY & ADENOIDECTOMY  as child    tonsils and adenoids       Review of Systems   Constitutional:  Negative for chills and fever.   HENT:  Positive for hearing loss. Negative for congestion, ear pain and sore throat.    Eyes:  Negative for pain and visual disturbance.   Respiratory:  Negative for cough and shortness of breath.    Cardiovascular:  Negative for chest pain, palpitations and peripheral edema.   Gastrointestinal:  Negative for abdominal pain, constipation, diarrhea, heartburn, hematochezia and nausea.   Breasts:  Negative for tenderness, breast mass and discharge.   Genitourinary:  Positive for urgency. Negative for dysuria, frequency, genital sores, hematuria, pelvic pain, vaginal bleeding and vaginal discharge.   Musculoskeletal:  Positive for myalgias. Negative for arthralgias and joint swelling.   Skin:  Negative for rash.   Neurological:  Negative for dizziness, weakness, headaches and paresthesias.   Psychiatric/Behavioral:  Negative for mood changes. The patient is not nervous/anxious.           OBJECTIVE:   /72   Pulse 67   Temp 98.1  F (36.7  C) (Tympanic)   Resp 16   Ht 1.702 m (5'  "7\")   Wt 93.9 kg (207 lb)   LMP 03/06/2011   SpO2 98%   BMI 32.42 kg/m    Physical Exam  GENERAL APPEARANCE: healthy, alert and no distress  EYES: Eyes grossly normal to inspection, PERRL and conjunctivae and sclerae normal  HENT: ear canals and TM's normal  NECK: no adenopathy, no asymmetry, masses, or scars and thyroid normal to palpation  RESP: lungs clear to auscultation - no rales, rhonchi or wheezes  BREAST: normal without masses, tenderness or nipple discharge and no palpable axillary masses or adenopathy  CV: regular rate and rhythm, normal S1 S2, no S3 or S4, no murmur, click or rub, no peripheral edema and peripheral pulses strong  ABDOMEN: soft, nontender, no hepatosplenomegaly, no masses and bowel sounds normal  MS: no musculoskeletal defects are noted and gait is age appropriate without ataxia  SKIN: no suspicious lesions or rashes  NEURO: Normal strength and tone, sensory exam grossly normal, mentation intact and speech normal  PSYCH: mentation appears normal and affect normal/bright    Diagnostic Test Results:  Labs reviewed in Epic    ASSESSMENT/PLAN:   Well adult exam    Mild episode of recurrent major depressive disorder (H24)  Well controlled. Refilled medication.     - FLUoxetine (PROZAC) 20 MG capsule; Take 1 capsule (20 mg) by mouth daily    Chronic low back pain without sciatica, unspecified back pain laterality  Stable CSA signed.  DAWNA obtained.  - Drug Confirmation Panel Urine with Creatinine    Chronic, continuous use of opioids  CSA signed.  DAWNA obtained.  - Drug Confirmation Panel Urine with Creatinine    Bilateral hearing loss, unspecified hearing loss type  - Adult Audiology  Referral; Future    Personal history of tobacco use  - Prof fee: Shared Decision Making for Lung Cancer Screening  - CT Chest Lung Cancer Scrn Low Dose wo; Future    Gastroesophageal reflux disease with esophagitis, unspecified whether hemorrhage  Well controlled. Refilled medication.   - omeprazole " "(PRILOSEC) 20 MG DR capsule; Take 1 capsule (20 mg) by mouth daily    Urinary, incontinence, stress female  Well controlled. Refilled medication.     - oxyBUTYnin ER (DITROPAN XL) 15 MG 24 hr tablet; Take 1 tablet (15 mg) by mouth daily    Primary osteoarthritis of right knee  Well controlled. Refilled medication.     - meloxicam (MOBIC) 7.5 MG tablet; Take 1 tablet (7.5 mg) by mouth daily    Lipid screening  - Lipid panel reflex to direct LDL Fasting; Future    Screen for colon cancer  - COLDELILAH([employer]); Future    Screening for endocrine, metabolic and immunity disorder  - TSH with free T4 reflex; Future  - Basic metabolic panel; Future        Patient has been advised of split billing requirements and indicates understanding: Yes      COUNSELING:  Reviewed preventive health counseling, as reflected in patient instructions      BMI:   Estimated body mass index is 32.42 kg/m  as calculated from the following:    Height as of this encounter: 1.702 m (5' 7\").    Weight as of this encounter: 93.9 kg (207 lb).         She reports that she quit smoking about 7 years ago. Her smoking use included cigarettes. She has a 10.00 pack-year smoking history. She has never used smokeless tobacco.  Nicotine/Tobacco Cessation Plan:             Marybel Aguiar MD  [facility]  "

## 2023-10-06 NOTE — LETTER
Opioid / Opioid Plus Controlled Substance Agreement    This is an agreement between you and your provider about the safe and appropriate use of controlled substance/opioids prescribed by your care team. Controlled substances are medicines that can cause physical and mental dependence (abuse).    There are strict laws about having and using these medicines. We here at [facility] are committing to working with you in your efforts to get better. To support you in this work, we ll help you schedule regular office appointments for medicine refills. If we must cancel or change your appointment for any reason, we ll make sure you have enough medicine to last until your next appointment.     As a Provider, I will:  Listen carefully to your concerns and treat you with respect.   Recommend a treatment plan that I believe is in your best interest. This plan may involve therapies other than opioid pain medication.   Talk with you often about the possible benefits, and the risk of harm of any medicine that we prescribe for you.   Provide a plan on how to taper (discontinue or go off) using this medicine if the decision is made to stop its use.    As a Patient, I understand that opioid(s):   Are a controlled substance prescribed by my care team to help me function or work and manage my condition(s).   Are strong medicines and can cause serious side effects such as:  Drowsiness, which can seriously affect my driving ability  A lower breathing rate, enough to cause death  Harm to my thinking ability   Depression   Abuse of and addiction to this medicine  Need to be taken exactly as prescribed. Combining opioids with certain medicines or chemicals (such as illegal drugs, sedatives, sleeping pills, and benzodiazepines) can be dangerous or even fatal. If I stop opioids suddenly, I may have severe withdrawal symptoms.  Do not work for all types of pain nor for all patients. If they re not helpful, I may be asked to stop  them.        The risks, benefits and side effects of these medicine(s) were explained to me. I agree that:  I will take part in other treatments as advised by my care team. This may be psychiatry or counseling, physical therapy, behavioral therapy, group treatment or a referral to a specialist.     I will keep all my appointments. I understand that this is part of the monitoring of opioids. My care team may require an office visit for EVERY opioid/controlled substance refill. If I miss appointments or don t follow instructions, my care team may stop my medicine.    I will take my medicines as prescribed. I will not change the dose or schedule unless my care team tells me to. There will be no refills if I run out early.     I may be asked to come to the clinic and complete a urine drug test or complete a pill count at any time. If I don t give a urine sample or participate in a pill count, the care team may stop my medicine.    I will only receive prescriptions from this clinic for chronic pain. If I am treated by another provider for acute pain issues, I will tell them that I am taking opioid pain medication for chronic pain and that I have a treatment agreement with this provider. I will inform my [facility] care team within one business day if I am given a prescription for any pain medication by another healthcare provider. My [facility] care team can contact other providers and pharmacists about my use of any medicines.    It is up to me to make sure that I don t run out of my medicines on weekends or holidays. If my care team is willing to refill my opioid prescription without a visit, I must request refills only during office hours. Refills may take up to 3 business days to process. I will use one pharmacy to fill all my opioid and other controlled substance prescriptions. I will notify the clinic about any changes to my insurance or medication availability.    I am responsible for my  prescriptions. If the medicine/prescription is lost, stolen or destroyed, it will not be replaced. I also agree not to share controlled substance medicines with anyone.    I am aware I should not use any illegal or recreational drugs. I agree not to drink alcohol unless my care team says I can.       If I enroll in the [place] [facility] program, I will tell my care team prior to my next refill.     I will tell my care team right away if I become pregnant, have a new medical problem treated outside of my regular clinic, or have a change in my medications.    I understand that this medicine can affect my thinking, judgment and reaction time. Alcohol and drugs affect the brain and body, which can affect the safety of my driving. Being under the influence of alcohol or drugs can affect my decision-making, behaviors, personal safety, and the safety of others. Driving while impaired (DWI) can occur if a person is driving, operating, or in physical control of a car, motorcycle, boat, snowmobile, ATV, motorbike, off-road vehicle, or any other motor vehicle ([place] Statute 169A.20). I understand the risk if I choose to drive or operate any vehicle or machinery.    I understand that if I do not follow any of the conditions above, my prescriptions or treatment may be stopped or changed.          Opioids  What You Need to Know    What are opioids?   Opioids are pain medicines that must be prescribed by a doctor. They are also known as narcotics.     Examples are:   morphine (MS Contin, Opal)  oxycodone (Oxycontin)  oxycodone and acetaminophen (Percocet)  hydrocodone and acetaminophen (Vicodin, Norco)   fentanyl patch (Duragesic)   hydromorphone (Dilaudid)   methadone  codeine (Tylenol #3)     What do opioids do well?   Opioids are best for severe short-term pain such as after a surgery or injury. They may work well for cancer pain. They may help some people with long-lasting (chronic) pain.     What do opioids NOT do  well?   Opioids never get rid of pain entirely, and they don t work well for most patients with chronic pain. Opioids don t reduce swelling, one of the causes of pain.                                    Other ways to manage chronic pain and improve function include:     Treat the health problem that may be causing pain  Anti-inflammation medicines, which reduce swelling and tenderness, such as ibuprofen (Advil, Motrin) or naproxen (Aleve)  Acetaminophen (Tylenol)  Antidepressants and anti-seizure medicines, especially for nerve pain  Topical treatments such as patches or creams  Injections or nerve blocks  Chiropractic or osteopathic treatment  Acupuncture, massage, deep breathing, meditation, visual imagery, aromatherapy  Use heat or ice at the pain site  Physical therapy   Exercise  Stop smoking  Take part in therapy       Risks and side effects     Talk to your doctor before you start or decide to keep taking opioids. Possible side effects include:    Lowering your breathing rate enough to cause death  Overdose, including death, especially if taking higher than prescribed doses  Worse depression symptoms; less pleasure in things you usually enjoy  Feeling tired or sluggish  Slower thoughts or cloudy thinking  Being more sensitive to pain over time; pain is harder to control  Trouble sleeping or restless sleep  Changes in hormone levels (for example, less testosterone)  Changes in sex drive or ability to have sex  Constipation  Unsafe driving  Itching and sweating  Dizziness  Nausea, throwing up and dry mouth    What else should I know about opioids?    Opioids may lead to dependence, tolerance, or addiction.    Dependence means that if you stop or reduce the medicine too quickly, you will have withdrawal symptoms. These include loose poop (diarrhea), jitters, flu-like symptoms, nervousness and tremors. Dependence is not the same as addiction.                     Tolerance means needing higher doses over time to  get the same effect. This may increase the chance of serious side effects.    Addiction is when people improperly use a substance that harms their body, their mind or their relations with others. Use of opiates can cause a relapse of addiction if you have a history of drug or alcohol abuse.    People who have used opioids for a long time may have a lower quality of life, worse depression, higher levels of pain and more visits to doctors.    You can overdose on opioids. Take these steps to lower your risk of overdose:    Recognize the signs:  Signs of overdose include decrease or loss of consciousness (blackout), slowed breathing, trouble waking up and blue lips. If someone is worried about overdose, they should call 911.    Talk to your doctor about Narcan (naloxone).   If you are at risk for overdose, you may be given a prescription for Narcan. This medicine very quickly reverses the effects of opioids.   If you overdose, a friend or family member can give you Narcan while waiting for the ambulance. They need to know the signs of overdose and how to give Narcan.     Don't use alcohol or street drugs.   Taking them with opioids can cause death.    Do not take any of these medicines unless your doctor says it s OK. Taking these with opioids can cause death:  Benzodiazepines, such as lorazepam (Ativan), alprazolam (Xanax) or diazepam (Valium)  Muscle relaxers, such as cyclobenzaprine (Flexeril)  Sleeping pills like zolpidem (Ambien)   Other opioids      How to keep you and other people safe while taking opioids:    Never share your opioids with others.  Opioid medicines are regulated by the Drug Enforcement Agency (JAMIE). Selling or sharing medications is a criminal act.    2. Be sure to store opioids in a secure place, locked up if possible. Young children can easily swallow them and overdose.    3. When you are traveling with your medicines, keep them in the original bottles. If you use a pill box, be sure you also  carry a copy of your medicine list from your clinic or pharmacy.    4. Safe disposal of opioids    Most pharmacies have places to get rid of medicine, called disposal kiosks. Medicine disposal options are also available in every [facility]. Search your county and  medication disposal  to find more options. You can find more details at:  https://www.[email or web address]./living-green/managing-unwanted-medications     I agree that my provider, clinic care team, and pharmacy may work with any city, state or federal law enforcement agency that investigates the misuse, sale, or other diversion of my controlled medicine. I will allow my provider to discuss my care with, or share a copy of, this agreement with any other treating provider, pharmacy or emergency room where I receive care.    I have read this agreement and have asked questions about anything I did not understand.    _______________________________________________________  Patient Signature - Charlotte Arredondo _____________________                   Date     _______________________________________________________  Provider Signature - Marybel Aguiar MD   _____________________                   Date     _______________________________________________________  Witness Signature (required if provider not present while patient signing)   _____________________                   Date

## 2023-10-06 NOTE — PATIENT INSTRUCTIONS
Lung Cancer Screening   Frequently Asked Questions  If you are at high-risk for lung cancer, getting screened with low-dose computed tomography (LDCT) every year can help save your life. This handout offers answers to some of the most common questions about lung cancer screening. If you have other questions, please call [phone] ([phone]).     What is it?  Lung cancer screening uses special X-ray technology to create an image of your lung tissue. The exam is quick and easy and takes less than 10 seconds. We don t give you any medicine or use any needles. You can eat before and after the exam. You don t need to change your clothes as long as the clothing on your chest doesn t contain metal. But, you do need to be able to hold your breath for at least 6 seconds during the exam.    What is the goal of lung cancer screening?  The goal of lung cancer screening is to save lives. Many times, lung cancer is not found until a person starts having physical symptoms. Lung cancer screening can help detect lung cancer in the earliest stages when it may be easier to treat.    Who should be screened for lung cancer?  We suggest lung cancer screening for anyone who is at high-risk for lung cancer. You are in the high-risk group if you:      are between the ages of 55 and 79, and    have smoked at least 1 pack of cigarettes a day for 20 or more years, and    still smoke or have quit within the past 15 years.    However, if you have a new cough or shortness of breath, you should talk to your doctor before being screened.    Why does it matter if I have symptoms?  Certain symptoms can be a sign that you have a condition in your lungs that should be checked and treated by your doctor. These symptoms include fever, chest pain, a new or changing cough, shortness of breath that you have never felt before, coughing up blood or unexplained weight loss. Having any of these symptoms can greatly affect the results of lung  cancer screening.       Should all smokers get an LDCT lung cancer screening exam?  It depends. Lung cancer screening is for a very specific group of men and women who have a history of heavy smoking over a long period of time (see  Who should be screened for lung cancer  above).  I am in the high-risk group, but have been diagnosed with cancer in the past. Is LDCT lung cancer screening right for me?  In some cases, you should not have LDCT lung screening, such as when your doctor is already following your cancer with CT scan studies. Your doctor will help you decide if LDCT lung screening is right for you.  Do I need to have a screening exam every year?  Yes. If you are in the high-risk group described earlier, you should get an LDCT lung cancer screening exam every year until you are 79, or are no longer willing or able to undergo screening and possible procedures to diagnose and treat lung cancer.  How effective is LDCT at preventing death from lung cancer?  Studies have shown that LDCT lung cancer screening can lower the risk of death from lung cancer by 20 percent in people who are at high-risk.  What are the risks?  There are some risks and limitations of LDCT lung cancer screening. We want to make sure you understand the risks and benefits, so please let us know if you have any questions. Your doctor may want to talk with you more about these risks.    Radiation exposure: As with any exam that uses radiation, there is a very small increased risk of cancer. The amount of radiation in LDCT is small--about the same amount a person would get from a mammogram. Your doctor orders the exam when he or she feels the potential benefits outweigh the risks.    False negatives: No test is perfect, including LDCT. It is possible that you may have a medical condition, including lung cancer, that is not found during your exam. This is called a false negative result.    False positives and more testing: LDCT very often finds  something in the lung that could be cancer, but in fact is not. This is called a false positive result. False positive tests often cause anxiety. To make sure these findings are not cancer, you may need to have more tests. These tests will be done only if you give us permission. Sometimes patients need a treatment that can have side effects, such as a biopsy. For more information on false positives, see  What can I expect from the results?     Findings not related to lung cancer: Your LDCT exam also takes pictures of areas of your body next to your lungs. In a very small number of cases, the CT scan will show an abnormal finding in one of these areas, such as your kidneys, adrenal glands, liver or thyroid. This finding may not be serious, but you may need more tests. Your doctor can help you decide what other tests you may need, if any.  What can I expect from the results?  About 1 out of 4 LDCT exams will find something that may need more tests. Most of the time, these findings are lung nodules. Lung nodules are very small collections of tissue in the lung. These nodules are very common, and the vast majority--more than 97 percent--are not cancer (benign). Most are normal lymph nodes or small areas of scarring from past infections.  But, if a small lung nodule is found to be cancer, the cancer can be cured more than 90 percent of the time. To know if the nodule is cancer, we may need to get more images before your next yearly screening exam. If the nodule has suspicious features (for example, it is large, has an odd shape or grows over time), we will refer you to a specialist for further testing.  Will my doctor also get the results?  Yes. Your doctor will get a copy of your results.  Is it okay to keep smoking now that there s a cancer screening exam?  No. Tobacco is one of the strongest cancer-causing agents. It causes not only lung cancer, but other cancers and cardiovascular (heart) diseases as well. The damage  caused by smoking builds over time. This means that the longer you smoke, the higher your risk of disease. While it is never too late to quit, the sooner you quit, the better.  Where can I find help to quit smoking?  The best way to prevent lung cancer is to stop smoking. If you have already quit smoking, congratulations and keep it up! For help on quitting smoking, please call [employer] at 4-215-QUITNOW (1-672.818.8328) or the American [employer] Society at 1-804.432.4628 to find local resources near you.  One-on-one health coaching:  If you d prefer to work individually with a health care provider on tobacco cessation, we offer:      Medication Therapy Management:  Our specially trained pharmacists work closely with you and your doctor to help you quit smoking.  Call [phone] or [phone] (toll free).

## 2023-10-20 ENCOUNTER — HOSPITAL ENCOUNTER (OUTPATIENT)
Dept: CT IMAGING | Facility: CLINIC | Age: 63
Discharge: HOME | End: 2023-10-20
Attending: FAMILY MEDICINE | Admitting: FAMILY MEDICINE
Payer: COMMERCIAL

## 2023-10-20 DIAGNOSIS — Z87.891 PERSONAL HISTORY OF TOBACCO USE: ICD-10-CM

## 2023-10-20 PROCEDURE — 71271 CT THORAX LUNG CANCER SCR C-: CPT

## 2023-10-23 ENCOUNTER — OFFICE VISIT (OUTPATIENT)
Dept: AUDIOLOGY | Facility: CLINIC | Age: 63
End: 2023-10-23
Payer: COMMERCIAL

## 2023-10-23 DIAGNOSIS — H90.3 SENSORINEURAL HEARING LOSS, BILATERAL: Primary | ICD-10-CM

## 2023-10-23 PROCEDURE — 92550 TYMPANOMETRY & REFLEX THRESH: CPT | Performed by: AUDIOLOGIST

## 2023-10-23 PROCEDURE — 92557 COMPREHENSIVE HEARING TEST: CPT | Performed by: AUDIOLOGIST

## 2023-10-23 NOTE — PROGRESS NOTES
AUDIOLOGY REPORT    SUBJECTIVE:  Charlotte Arredondo is a 62 year old female who was seen in the Audiology Clinic at the [facility] for audiologic evaluation, referred by Marybel Aguiar M.D. The patient has been seen previously in this clinic on 11/11/2015 for assessment and results indicated a borderline normal to mild sensorineural hearing loss bilaterally. The patient reports a gradual decrease in her hearing . The patient denies  bilateral tinnitus, bilateral otalgia, and history of noise exposure.  The patient notes difficulty with communication in difficult listening situations.  They were accompanied today by their self.    OBJECTIVE:  Abuse Screening:  Do you feel unsafe at home or work/school? No  Do you feel threatened by someone? No  Does anyone try to keep you from having contact with others, or doing things outside of your home? No  Physical signs of abuse present? No     Fall Risk Screen:  1. Have you fallen two or more times in the past year? No  2. Have you fallen and had an injury in the past year? No      Otoscopic exam indicates ears are clear of cerumen bilaterally     Pure Tone Thresholds assessed using conventional audiometry with good  reliability from 250-8000 Hz bilaterally using circumaural headphones     RIGHT:  normal sloping to borderline-normal and mild sensorineural hearing loss    LEFT:    normal sloping to borderline-normal and mild sensorineural hearing loss    Tympanogram:    RIGHT: normal eardrum mobility    LEFT:   normal eardrum mobility    Reflexes (reported by stimulus ear):  RIGHT: Ipsilateral is present at normal levels  RIGHT: Contralateral is present at normal levels  LEFT:   Ipsilateral is present at normal levels  LEFT:   Contralateral is present at normal levels      Speech Reception Threshold:    RIGHT: 25 dB HL    LEFT:   25 dB HL  Word Recognition Score:     RIGHT: 100% at 52 dB HL using NU-6 recorded word list.    LEFT:   92% at 65 dB HL  using NU-6 recorded word list.    QuickSIN:    RIGHT: 3 dB SNR loss    LEFT: 5 dB SNR loss    ASSESSMENT:     ICD-10-CM    1. Sensorineural hearing loss, bilateral  H90.3           Compared to patient's previous audiogram dated 11/11/2015, hearing has decreased 10-15 dB from 6295-2065 Hz bilaterally. SNR loss remains stable bilaterally. Today s results were discussed with the patient in detail.     PLAN:  Patient was counseled regarding hearing loss and impact on communication.  Joana is not interested in amplification at this time.  It is recommended that the patient return to clinic if notes changes in hearing.  Please call this clinic with questions regarding these results or recommendations.        Jailene Camargo M.A. -[facility], #[id number]

## 2023-10-28 LAB — NONINV COLON CA DNA+OCC BLD SCRN STL QL: NEGATIVE

## 2023-11-13 DIAGNOSIS — E66.812 CLASS 2 SEVERE OBESITY WITH SERIOUS COMORBIDITY AND BODY MASS INDEX (BMI) OF 35.0 TO 35.9 IN ADULT, UNSPECIFIED OBESITY TYPE (H): ICD-10-CM

## 2023-11-13 DIAGNOSIS — E66.01 CLASS 2 SEVERE OBESITY WITH SERIOUS COMORBIDITY AND BODY MASS INDEX (BMI) OF 35.0 TO 35.9 IN ADULT, UNSPECIFIED OBESITY TYPE (H): ICD-10-CM

## 2023-11-13 DIAGNOSIS — R73.03 PRE-DIABETES: ICD-10-CM

## 2023-11-13 RX ORDER — SEMAGLUTIDE 2.4 MG/.75ML
2.4 INJECTION, SOLUTION SUBCUTANEOUS WEEKLY
Qty: 3 ML | Refills: 0 | Status: SHIPPED | OUTPATIENT
Start: 2023-11-13 | End: 2023-11-21

## 2023-11-21 ENCOUNTER — VIRTUAL VISIT (OUTPATIENT)
Dept: ENDOCRINOLOGY | Facility: CLINIC | Age: 63
End: 2023-11-21
Payer: COMMERCIAL

## 2023-11-21 VITALS — WEIGHT: 203 LBS | HEIGHT: 67 IN | BODY MASS INDEX: 31.86 KG/M2

## 2023-11-21 DIAGNOSIS — E66.01 CLASS 2 SEVERE OBESITY WITH SERIOUS COMORBIDITY AND BODY MASS INDEX (BMI) OF 35.0 TO 35.9 IN ADULT, UNSPECIFIED OBESITY TYPE (H): ICD-10-CM

## 2023-11-21 DIAGNOSIS — R73.03 PRE-DIABETES: ICD-10-CM

## 2023-11-21 DIAGNOSIS — E66.812 CLASS 2 SEVERE OBESITY WITH SERIOUS COMORBIDITY AND BODY MASS INDEX (BMI) OF 35.0 TO 35.9 IN ADULT, UNSPECIFIED OBESITY TYPE (H): ICD-10-CM

## 2023-11-21 PROCEDURE — 99213 OFFICE O/P EST LOW 20 MIN: CPT | Mod: 95

## 2023-11-21 RX ORDER — SEMAGLUTIDE 2.4 MG/.75ML
2.4 INJECTION, SOLUTION SUBCUTANEOUS WEEKLY
Qty: 9 ML | Refills: 1 | Status: SHIPPED | OUTPATIENT
Start: 2023-11-21 | End: 2024-05-03 | Stop reason: DRUGHIGH

## 2023-11-21 ASSESSMENT — PAIN SCALES - GENERAL: PAINLEVEL: NO PAIN (0)

## 2023-11-21 NOTE — PATIENT INSTRUCTIONS
"Cole Montero, it was nice to meet you today!  Thank you for allowing us the privilege of caring for you. We hope we provided you with the excellent service you deserve.   Please let us know if there is anything else we can do for you so that we can be sure you are completely satisfied with your care experience.    To ensure the quality of our services you may be receiving a patient satisfaction survey from an independent patient satisfaction monitoring company.    The greatest compliment you can give is a \"Likely to Recommend\"    Your visit was with MYRA GOLD PA-C today.    Instructions per today's visit:     Continue Weogvy 2.4mg once weekly. Refills sent.   Goal to transition to Zepbound when available   Follow up with Myra Mathias in 3 months     ___________________________________________________________________________  Important contact and scheduling information:  Please call our contact center at [phone] to schedule your next appointments.  For any nursing questions or concerns call Mary Salazar LPN at [phone] or Miguelina Gan RN at [phone]  Please call during clinic hours Monday through Friday 8:00a - 4:00p if you have questions or you can contact us via Handle at anytime and we will reply during clinic hours.    Lab results will be communicated through My Chart or letter (if My Chart not used). Please call the clinic if you have not received communication after 1 week or if you have any questions.?  Clinic Fax: [phone]  __________________________________________________________________________    If labs were ordered today:    Please make an appointment to have them drawn at your convenience.     To schedule the Lab Appointment using Handle:  Select \"Schedule an Appointment\"  Select \"Lab Only\"  For \"A couple of questions\", select \"Other\"  For \"Which locations work for you?, select the location and set up the appointment    To schedule by phone call [phone] to schedule a lab only " appointment at any [facility] lab.  ___________________________________________________________________________  Work with [facility] !  Virtual Sessions are Available through [facility] Weight Management Clinics    To learn more, call to schedule a free, Health  Q&A appointment: [phone]     What is Health Coaching?  Do you know what you are supposed to do, but you just aren't doing it?  Then, HEALTH COACHING may help you!   Get unstuck and move forward with the support of a professionally trained [employer]-HWC (National Board-Certified Health and ) who uses evidence-based approaches to help you move forward with healthy lifestyle changes in the areas of weight loss, stress management and overall well-being.    Health Coaches help you identify goals that will work best for you. Health Coaches provide support and encouragement with overcoming barriers and help you to find inspiration and motivation to lead a healthy lifestyle.    Option one:  Health Coaching 3-Pack; Three, 30-minute Health Coaching Visits, for $99  Visits are done virtually (phone or video)  This is a self pay service; we do not accept insurance for terri coaching.    Option two:   The 24 week Plan; 11 Health Coaching Visits, and a 7 months subscription to [facility]-- on-demand fitness, nutrition and mindfulness classes, for $499 (employee discounts may be available). Participants will also meet regularly with a weight management Medical Provider and a Registered/Licensed Dietician.  This is a self-pay service; we do not accept insurance for health coaching.    To Schedule a free Health  Q&A appointment to learn more,  call [phone].  ____________________________________________________________________  [facility]  Healthy Lifestyle Group    Healthy Lifestyle Group  This is a 60 minute virtual coaching group for those who want to lead a healthier lifestyle. Come  "together to set goals and overcome barriers in a supportive group environment. We will address the four pillars of health--nutrition, exercise, sleep and emotional well-being.  This group is highly recommended for those who are participating in the 24 week Healthy Lifestyle Plan and our Health Coaching sessions.    WHEN: This group meets the first Friday of the month, 12:30 PM - 1:30 PM online, via a zoom meeting.      FACILITATOR: Led by National Board Certified Health and , Cecilia De La Rosa [facility].    TO REGISTER: Please call the Call Center at [phone] to register. You will get an appointment to attend in [employer]. Fifteen minutes prior to the meeting, complete the e-check in and you will get the link to join the meeting.  There is no charge to attend this group and space is limited.      2023 and 2024 Meeting Topics and Dates:    November 3: Introduction to Mindfulness (Learn simple and effective mindfulness practices and how it can benefit you)    December 8: Let's Talk (guided discussion on our wins and challenges)    January 5: New Years Vision: Manifest your Best 2024! (Guided imagery,  journaling and discussion)    February 2: Let's Talk    March 1: 10 Percent Happier by Shai Ballard (Book Bites; a guided discussion on the nuggets of wisdom from favorite wellness books; no need to read the book but highly encouraged)    April 5: Let's Talk    May 3: \"Essentialism; The Disciplined Pursuit of Less by Anders Crawford (book bites discussion)    June 7: Let's Talk    July 5: NO MEETING, off for the 4th of July Holiday    August 2: The Blue Zones, Secrets for Living a Longer Life by Shai Marin (book bites discussion)      If you would like bariatric surgery specific support group info please let your care team know.         Thank you,   [facility] Comprehensive Weight Management Team                          "

## 2023-11-21 NOTE — PROGRESS NOTES
Virtual Visit Details    Type of service:  Video Visit     Originating Location (pt. Location): Home    Distant Location (provider location):  Off-site  Platform used for Video Visit: [facility] Weight Management Note     Charlotte Arredondo  MRN:  [id number]  :  1960  JUSTICE:  2023    Dear Marybel Aguiar MD,    I had the pleasure of seeing your patient Charlotte Arredondo. She is a 62 year old female who I am continuing to see for treatment of obesity related to:        2022     4:38 PM   --   I have the following health issues associated with obesity Pre-Diabetes    High Cholesterol    Sleep Apnea    Stress Incontinence   I have the following symptoms associated with obesity Knee Pain    Depression    Back Pain    Fatigue       Assessment & Plan   Problem List Items Addressed This Visit    None  Visit Diagnoses       Class 2 severe obesity with serious comorbidity and body mass index (BMI) of 35.0 to 35.9 in adult, unspecified obesity type (H)        Relevant Medications    Semaglutide-Weight Management (WEGOVY) 2.4 MG/0.75ML pen    Pre-diabetes        Relevant Medications    Semaglutide-Weight Management (WEGOVY) 2.4 MG/0.75ML pen             Continue Weogvy 2.4mg once weekly. Refills sent.   Goal to transition to Zepbound when available   Follow up with Grisel Mathias in 3 months       INTERVAL HISTORY:  New MWM - 2022 - Started Saxenda  2023 - wanted to continue Saxenda 3.0mg once daily.   Patient reached out via [employer] 2023 that Saxenda was no longer helpful with hunger, and would like to transition to Wegovy   Transitioned to Wegovy 1.7mg once weekly   2/15/2023 - continued Wegovy 1.7mg once weekly   Reached out via [employer] 2023 and increase to Wegovy 2.4mg   23 - Continued Wegovy 2.4mg   Last seen 23 - continued Wegovy 2.4mg       Clothes are fitting better.     Is retiring next week, will be getting new insurance for the new year.      Anti-obesity medication history    Current:   Wegovy 2.4 - continues to be more helpful with weight maintenance at this point.     Past/Failed/contraindicated:   - Phentermine - previously tried. Side effects of insomnia and increased anxiety.   - Topiramate - concern for brain fog   - Naltrexone - currently talking opioids as needed for chronic pain    Recent diet changes: Having some snacks in the evening when watching TV.     Recent exercise/activity changes: Joined the gym. Has been walking at the gym. Will do a session to learn how to use machines.     Recent stressors: Retiring next week. Has a new grandbaby. Is less anxious with half-way over the past month. Has a lot of projects, hang out with grandchildren     Recent sleep changes: No concerns       CURRENT WEIGHT:   203 lbs 0 oz    Initial Weight (lbs): 242 lbs  Last Visits Weight: 93.9 kg (207 lb)  Cumulative weight loss (lbs): 39  Weight Loss Percentage: 16.12%    Wt Readings from Last 5 Encounters:   11/21/23 92.1 kg (203 lb)   10/06/23 93.9 kg (207 lb)   07/20/23 93.9 kg (207 lb)   04/14/23 99.7 kg (219 lb 12.8 oz)   04/13/23 98.9 kg (218 lb)             11/20/2023     8:39 AM   Changes and Difficulties   I have made the following changes to my diet since my last visit: Still eating healthy. Have lost 4 more pounds.   With regards to my diet, I am still struggling with: Late night eating   I have made the following changes to my activity/exercise since my last visit: Joined the gym         MEDICATIONS:   Current Outpatient Medications   Medication Sig Dispense Refill    Semaglutide-Weight Management (WEGOVY) 2.4 MG/0.75ML pen Inject 2.4 mg Subcutaneous once a week 9 mL 1    acetaminophen (TYLENOL) 650 MG CR tablet Take 1,300 mg by mouth every 8 hours as needed       biotin 1000 MCG TABS tablet Take 1,000 mcg by mouth daily      CALCIUM + D OR 1 TABLET DAILY      FLUoxetine (PROZAC) 20 MG capsule Take 1 capsule (20 mg) by mouth daily 90 capsule 4  "   HYDROcodone-acetaminophen (NORCO)  MG per tablet TAKE 1 TABLET BY MOUTH EVERY 6 HOURS AS NEEDED FOR SEVERE PAIN (7-10) 30 tablet 0    ibuprofen 200 MG capsule Take 200 mg by mouth every 4 hours as needed for fever      meloxicam (MOBIC) 7.5 MG tablet Take 1 tablet (7.5 mg) by mouth daily 90 tablet 3    methocarbamol (ROBAXIN) 750 MG tablet TAKE ONE TO ONE AND ONE-HALF TABLETS BY MOUTH THREE TIMES A DAY AS NEEDED FOR MUSCLE SPASMS 45 tablet 1    MULTIVITAMINS OR 1 daily       omeprazole (PRILOSEC) 20 MG DR capsule Take 1 capsule (20 mg) by mouth daily 90 capsule 4    oxyBUTYnin ER (DITROPAN XL) 15 MG 24 hr tablet Take 1 tablet (15 mg) by mouth daily 90 tablet 4    Vitamin D3 (CHOLECALCIFEROL) 125 MCG (5000 UT) tablet 6000 UNITs             11/20/2023     8:39 AM   Weight Loss Medication History Reviewed With Patient   Which weight loss medications are you currently taking on a regular basis? Wegovy   Are you having any side effects from the weight loss medication that we have prescribed you? No         PHYSICAL EXAM:  Objective    Ht 1.702 m (5' 7.01\")   Wt 92.1 kg (203 lb)   LMP 03/06/2011   BMI 31.79 kg/m      Vitals - Patient Reported  Pain Score: No Pain (0)      Vitals:  No vitals were obtained today due to virtual visit.    GENERAL: Healthy, alert and no distress  EYES: Eyes grossly normal to inspection.  No discharge or erythema, or obvious scleral/conjunctival abnormalities.  RESP: No audible wheeze, cough, or visible cyanosis.  No visible retractions or increased work of breathing.    SKIN: Visible skin clear. No significant rash, abnormal pigmentation or lesions.  NEURO: Cranial nerves grossly intact.  Mentation and speech appropriate for age.  PSYCH: Mentation appears normal, affect normal/bright, judgement and insight intact, normal speech and appearance well-groomed.        Sincerely,    MYRA GOLD PA-C      27 minutes spent by me on the date of the encounter doing chart review, history " and exam, documentation and further activities per the note

## 2023-11-21 NOTE — LETTER
2023       RE: Charlotte Arredondo  [address] [place] [address]     Dear Colleague,    Thank you for referring your patient, Charlotte Arredondo, to the [facility] WEIGHT MANAGEMENT CLINIC [place] at [facility]. Please see a copy of my visit note below.    Virtual Visit Details    Type of service:  Video Visit     Originating Location (pt. Location): Home    Distant Location (provider location):  Off-site  Platform used for Video Visit: [facility] Medical Weight Management Note     Charlotte Arredondo  MRN:  [id number]  :  1960  JUSTICE:  2023    Dear Marybel Aguiar MD,    I had the pleasure of seeing your patient Charlotte Arredondo. She is a 62 year old female who I am continuing to see for treatment of obesity related to:        2022     4:38 PM   --   I have the following health issues associated with obesity Pre-Diabetes    High Cholesterol    Sleep Apnea    Stress Incontinence   I have the following symptoms associated with obesity Knee Pain    Depression    Back Pain    Fatigue       Assessment & Plan  Problem List Items Addressed This Visit    None  Visit Diagnoses       Class 2 severe obesity with serious comorbidity and body mass index (BMI) of 35.0 to 35.9 in adult, unspecified obesity type (H)        Relevant Medications    Semaglutide-Weight Management (WEGOVY) 2.4 MG/0.75ML pen    Pre-diabetes        Relevant Medications    Semaglutide-Weight Management (WEGOVY) 2.4 MG/0.75ML pen             Continue Weogvy 2.4mg once weekly. Refills sent.   Goal to transition to Zepbound when available   Follow up with Grisel Mathias in 3 months       INTERVAL HISTORY:  New MWM - 2022 - Started Saxenda  2023 - wanted to continue Saxenda 3.0mg once daily.   Patient reached out via [employer] 2023 that Saxenda was no longer helpful with hunger, and would like to transition to Wegovy   Transitioned to Wegovy  1.7mg once weekly   2/15/2023 - continued Wegovy 1.7mg once weekly   Reached out via [employer] 2/28/2023 and increase to Wegovy 2.4mg   4/13/23 - Continued Wegovy 2.4mg   Last seen 7/20/23 - continued Wegovy 2.4mg       Clothes are fitting better.     Is retiring next week, will be getting new insurance for the new year.     Anti-obesity medication history    Current:   Wegovy 2.4 - continues to be more helpful with weight maintenance at this point.     Past/Failed/contraindicated:   - Phentermine - previously tried. Side effects of insomnia and increased anxiety.   - Topiramate - concern for brain fog   - Naltrexone - currently talking opioids as needed for chronic pain    Recent diet changes: Having some snacks in the evening when watching TV.     Recent exercise/activity changes: Joined the gym. Has been walking at the gym. Will do a session to learn how to use machines.     Recent stressors: Retiring next week. Has a new grandbaby. Is less anxious with halfway over the past month. Has a lot of projects, hang out with grandchildren     Recent sleep changes: No concerns       CURRENT WEIGHT:   203 lbs 0 oz    Initial Weight (lbs): 242 lbs  Last Visits Weight: 93.9 kg (207 lb)  Cumulative weight loss (lbs): 39  Weight Loss Percentage: 16.12%    Wt Readings from Last 5 Encounters:   11/21/23 92.1 kg (203 lb)   10/06/23 93.9 kg (207 lb)   07/20/23 93.9 kg (207 lb)   04/14/23 99.7 kg (219 lb 12.8 oz)   04/13/23 98.9 kg (218 lb)             11/20/2023     8:39 AM   Changes and Difficulties   I have made the following changes to my diet since my last visit: Still eating healthy. Have lost 4 more pounds.   With regards to my diet, I am still struggling with: Late night eating   I have made the following changes to my activity/exercise since my last visit: Joined the gym         MEDICATIONS:   Current Outpatient Medications   Medication Sig Dispense Refill    Semaglutide-Weight Management (WEGOVY) 2.4 MG/0.75ML pen  "Inject 2.4 mg Subcutaneous once a week 9 mL 1    acetaminophen (TYLENOL) 650 MG CR tablet Take 1,300 mg by mouth every 8 hours as needed       biotin 1000 MCG TABS tablet Take 1,000 mcg by mouth daily      CALCIUM + D OR 1 TABLET DAILY      FLUoxetine (PROZAC) 20 MG capsule Take 1 capsule (20 mg) by mouth daily 90 capsule 4    HYDROcodone-acetaminophen (NORCO)  MG per tablet TAKE 1 TABLET BY MOUTH EVERY 6 HOURS AS NEEDED FOR SEVERE PAIN (7-10) 30 tablet 0    ibuprofen 200 MG capsule Take 200 mg by mouth every 4 hours as needed for fever      meloxicam (MOBIC) 7.5 MG tablet Take 1 tablet (7.5 mg) by mouth daily 90 tablet 3    methocarbamol (ROBAXIN) 750 MG tablet TAKE ONE TO ONE AND ONE-HALF TABLETS BY MOUTH THREE TIMES A DAY AS NEEDED FOR MUSCLE SPASMS 45 tablet 1    MULTIVITAMINS OR 1 daily       omeprazole (PRILOSEC) 20 MG DR capsule Take 1 capsule (20 mg) by mouth daily 90 capsule 4    oxyBUTYnin ER (DITROPAN XL) 15 MG 24 hr tablet Take 1 tablet (15 mg) by mouth daily 90 tablet 4    Vitamin D3 (CHOLECALCIFEROL) 125 MCG (5000 UT) tablet 6000 UNITs             11/20/2023     8:39 AM   Weight Loss Medication History Reviewed With Patient   Which weight loss medications are you currently taking on a regular basis? Wegovy   Are you having any side effects from the weight loss medication that we have prescribed you? No         PHYSICAL EXAM:  Objective   Ht 1.702 m (5' 7.01\")   Wt 92.1 kg (203 lb)   LMP 03/06/2011   BMI 31.79 kg/m      Vitals - Patient Reported  Pain Score: No Pain (0)      Vitals:  No vitals were obtained today due to virtual visit.    GENERAL: Healthy, alert and no distress  EYES: Eyes grossly normal to inspection.  No discharge or erythema, or obvious scleral/conjunctival abnormalities.  RESP: No audible wheeze, cough, or visible cyanosis.  No visible retractions or increased work of breathing.    SKIN: Visible skin clear. No significant rash, abnormal pigmentation or lesions.  NEURO: " Cranial nerves grossly intact.  Mentation and speech appropriate for age.  PSYCH: Mentation appears normal, affect normal/bright, judgement and insight intact, normal speech and appearance well-groomed.        Sincerely,    MYRA GOLD PA-C      27 minutes spent by me on the date of the encounter doing chart review, history and exam, documentation and further activities per the note

## 2023-11-30 DIAGNOSIS — F11.90 CHRONIC, CONTINUOUS USE OF OPIOIDS: ICD-10-CM

## 2023-11-30 DIAGNOSIS — M54.50 CHRONIC LOW BACK PAIN WITHOUT SCIATICA, UNSPECIFIED BACK PAIN LATERALITY: ICD-10-CM

## 2023-11-30 DIAGNOSIS — G89.29 CHRONIC LOW BACK PAIN WITHOUT SCIATICA, UNSPECIFIED BACK PAIN LATERALITY: ICD-10-CM

## 2023-12-01 RX ORDER — HYDROCODONE BITARTRATE AND ACETAMINOPHEN 10; 325 MG/1; MG/1
1 TABLET ORAL EVERY 6 HOURS PRN
Qty: 30 TABLET | Refills: 0 | Status: SHIPPED | OUTPATIENT
Start: 2023-12-01 | End: 2024-02-16

## 2023-12-04 DIAGNOSIS — G89.29 CHRONIC LOW BACK PAIN WITHOUT SCIATICA, UNSPECIFIED BACK PAIN LATERALITY: ICD-10-CM

## 2023-12-04 DIAGNOSIS — M54.50 CHRONIC LOW BACK PAIN WITHOUT SCIATICA, UNSPECIFIED BACK PAIN LATERALITY: ICD-10-CM

## 2023-12-07 RX ORDER — METHOCARBAMOL 750 MG/1
TABLET, FILM COATED ORAL
Qty: 45 TABLET | Refills: 1 | Status: SHIPPED | OUTPATIENT
Start: 2023-12-07 | End: 2024-06-24

## 2024-01-05 ENCOUNTER — MYC MEDICAL ADVICE (OUTPATIENT)
Dept: FAMILY MEDICINE | Facility: CLINIC | Age: 64
End: 2024-01-05
Payer: COMMERCIAL

## 2024-01-05 DIAGNOSIS — F41.9 ANXIETY: Primary | ICD-10-CM

## 2024-01-08 RX ORDER — LORAZEPAM 0.5 MG/1
0.5 TABLET ORAL EVERY 6 HOURS PRN
Qty: 20 TABLET | Refills: 0 | Status: SHIPPED | OUTPATIENT
Start: 2024-01-08

## 2024-02-16 DIAGNOSIS — G89.29 CHRONIC LOW BACK PAIN WITHOUT SCIATICA, UNSPECIFIED BACK PAIN LATERALITY: ICD-10-CM

## 2024-02-16 DIAGNOSIS — M54.50 CHRONIC LOW BACK PAIN WITHOUT SCIATICA, UNSPECIFIED BACK PAIN LATERALITY: ICD-10-CM

## 2024-02-16 DIAGNOSIS — F11.90 CHRONIC, CONTINUOUS USE OF OPIOIDS: ICD-10-CM

## 2024-02-16 RX ORDER — HYDROCODONE BITARTRATE AND ACETAMINOPHEN 10; 325 MG/1; MG/1
1 TABLET ORAL EVERY 6 HOURS PRN
Qty: 30 TABLET | Refills: 0 | Status: SHIPPED | OUTPATIENT
Start: 2024-02-16 | End: 2024-05-01

## 2024-02-16 NOTE — TELEPHONE ENCOUNTER
Left message for patient to call back regarding her refill.     Pat Guy, CMA on 2/16/2024 at 1:19 PM

## 2024-02-16 NOTE — TELEPHONE ENCOUNTER
Pt calling back regarding refill.  Read her Dr Aguiar's note.  Pt is in good understanding.    Sarina Jaramillo on 2/16/2024 at 1:53 PM

## 2024-03-05 ENCOUNTER — PATIENT OUTREACH (OUTPATIENT)
Dept: CARE COORDINATION | Facility: CLINIC | Age: 64
End: 2024-03-05
Payer: COMMERCIAL

## 2024-03-06 ENCOUNTER — PATIENT OUTREACH (OUTPATIENT)
Dept: FAMILY MEDICINE | Facility: CLINIC | Age: 64
End: 2024-03-06
Payer: COMMERCIAL

## 2024-03-27 ENCOUNTER — VIRTUAL VISIT (OUTPATIENT)
Dept: ENDOCRINOLOGY | Facility: CLINIC | Age: 64
End: 2024-03-27
Payer: COMMERCIAL

## 2024-03-27 ENCOUNTER — TELEPHONE (OUTPATIENT)
Dept: ENDOCRINOLOGY | Facility: CLINIC | Age: 64
End: 2024-03-27

## 2024-03-27 VITALS — HEIGHT: 67 IN | WEIGHT: 198 LBS | BODY MASS INDEX: 31.08 KG/M2

## 2024-03-27 DIAGNOSIS — E66.01 CLASS 2 SEVERE OBESITY WITH SERIOUS COMORBIDITY AND BODY MASS INDEX (BMI) OF 35.0 TO 35.9 IN ADULT, UNSPECIFIED OBESITY TYPE (H): Primary | ICD-10-CM

## 2024-03-27 DIAGNOSIS — E66.812 CLASS 2 SEVERE OBESITY WITH SERIOUS COMORBIDITY AND BODY MASS INDEX (BMI) OF 35.0 TO 35.9 IN ADULT, UNSPECIFIED OBESITY TYPE (H): Primary | ICD-10-CM

## 2024-03-27 PROCEDURE — 99214 OFFICE O/P EST MOD 30 MIN: CPT | Mod: 95

## 2024-03-27 ASSESSMENT — PAIN SCALES - GENERAL: PAINLEVEL: SEVERE PAIN (7)

## 2024-03-27 NOTE — PROGRESS NOTES
Virtual Visit Details    Type of service:  Video Visit     Originating Location (pt. Location): Home    Distant Location (provider location):  Off-site  Platform used for Video Visit: [facility] Medical Weight Management Note     Charlotte Arredondo  MRN:  [id number]  :  1960  JUSTICE:  3/27/2024    Dear Marybel Aguiar MD,    I had the pleasure of seeing your patient Charlotte Arredondo. She is a 63 year old female who I am continuing to see for treatment of obesity related to:        2022     4:38 PM   --   I have the following health issues associated with obesity Pre-Diabetes    High Cholesterol    Sleep Apnea    Stress Incontinence   I have the following symptoms associated with obesity Knee Pain    Depression    Back Pain    Fatigue       Assessment & Plan   Problem List Items Addressed This Visit    None  Visit Diagnoses       Class 2 severe obesity with serious comorbidity and body mass index (BMI) of 35.0 to 35.9 in adult, unspecified obesity type (H)    -  Primary    Relevant Medications    tirzepatide-Weight Management (ZEPBOUND) 7.5 MG/0.5ML prefilled pen           Transition to Zepbound 7.5mg once weekly  Continue Wegovy 2.4mg once weekly until Zepbound is approved. Stop wegovy 1 week prior to starting zepbound   Grisel Mathias in 3-4 months.     INTERVAL HISTORY:  New MWM - 2022 - Started Saxenda  2023 - wanted to continue Saxenda 3.0mg once daily.   Patient reached out via [employer] 2023 that Saxenda was no longer helpful with hunger, and would like to transition to Wegovy   Transitioned to Wegovy 1.7mg once weekly   2/15/2023 - continued Wegovy 1.7mg once weekly   Reached out via [employer] 2023 and increase to Wegovy 2.4mg   Continued Wegovy 2.4mg       Still losing weight, but slower over the past couple of months.     Just got over Noravirus.     New insurance has continued to cover weight loss medications.     Anti-obesity medication history    Current:   Wegovy  2.4mg - no side effects.     Is interested in transitioning to Zepbound. Feels like having more food noise over the past couple of months    Past/Failed/contraindicated:   - Phentermine - previously tried. Side effects of insomnia and increased anxiety.   - Topiramate - concern for brain fog   - Naltrexone - currently talking opioids as needed for chronic pain    Recent diet changes: Still having some increase hunger in the night time. Trying to mindful of snacks. Drinking 60-90oz daily. Eating 2-3 meals a day.     Recent exercise/activity changes: Was in [place] and did a lot of walking. Recent low back pain, which has limited mobility. Has been harder with the recent snow as well.     Recent stressors: Has retired since last visit. Has been really nice. Has a granddaughter due in August.       CURRENT WEIGHT:   198 lbs 0 oz    Initial Weight (lbs): 242 lbs  Last Visits Weight: 92.1 kg (203 lb)  Cumulative weight loss (lbs): 44  Weight Loss Percentage: 18.18%    Wt Readings from Last 5 Encounters:   03/27/24 89.8 kg (198 lb)   11/21/23 92.1 kg (203 lb)   10/06/23 93.9 kg (207 lb)   07/20/23 93.9 kg (207 lb)   04/14/23 99.7 kg (219 lb 12.8 oz)             3/26/2024     1:07 PM   Changes and Difficulties   I have made the following changes to my diet since my last visit: Still reducing fat and sugar intake   With regards to my diet, I am still struggling with: late night eating   I have made the following changes to my activity/exercise since my last visit: regular exercise         MEDICATIONS:   Current Outpatient Medications   Medication Sig Dispense Refill    acetaminophen (TYLENOL) 650 MG CR tablet Take 1,300 mg by mouth every 8 hours as needed       CALCIUM + D OR 1 TABLET DAILY      FLUoxetine (PROZAC) 20 MG capsule Take 1 capsule (20 mg) by mouth daily 90 capsule 4    HYDROcodone-acetaminophen (NORCO)  MG per tablet Take 1 tablet by mouth every 6 hours as needed for severe pain DO NOT combine with  "lorazepam. 30 tablet 0    ibuprofen 200 MG capsule Take 200 mg by mouth every 4 hours as needed for fever      LORazepam (ATIVAN) 0.5 MG tablet Take 1 tablet (0.5 mg) by mouth every 6 hours as needed for anxiety 20 tablet 0    meloxicam (MOBIC) 7.5 MG tablet Take 1 tablet (7.5 mg) by mouth daily 90 tablet 3    methocarbamol (ROBAXIN) 750 MG tablet TAKE ONE TO ONE AND ONE-HALF TABLETS BY MOUTH THREE TIMES DAILY AS NEEDED FOR MUSCLE SPASMS 45 tablet 1    MULTIVITAMINS OR 1 daily       omeprazole (PRILOSEC) 20 MG DR capsule Take 1 capsule (20 mg) by mouth daily 90 capsule 4    oxyBUTYnin ER (DITROPAN XL) 15 MG 24 hr tablet Take 1 tablet (15 mg) by mouth daily 90 tablet 4    Semaglutide-Weight Management (WEGOVY) 2.4 MG/0.75ML pen Inject 2.4 mg Subcutaneous once a week 9 mL 1    tirzepatide-Weight Management (ZEPBOUND) 7.5 MG/0.5ML prefilled pen Inject 0.5 mLs (7.5 mg) Subcutaneous every 7 days 2 mL 3    Vitamin D3 (CHOLECALCIFEROL) 125 MCG (5000 UT) tablet 6000 UNITs      biotin 1000 MCG TABS tablet Take 1,000 mcg by mouth daily (Patient not taking: Reported on 3/27/2024)             3/26/2024     1:07 PM   Weight Loss Medication History Reviewed With Patient   Which weight loss medications are you currently taking on a regular basis? Wegovy   Are you having any side effects from the weight loss medication that we have prescribed you? No               10/11/2010     5:00 PM 3/7/2018     4:00 PM   SCOTT Score (Last Two)   SCOTT Raw Score 43 36   Activation Score 68.5 75.5   SCOTT Level 4 4           Objective    Ht 1.702 m (5' 7\")   Wt 89.8 kg (198 lb)   LMP 03/06/2011   BMI 31.01 kg/m      Vitals - Patient Reported  Pain Score: Severe Pain (7)  Pain Loc: Mid Back      PHYSICAL EXAM:    GENERAL: alert and no distress  EYES: Eyes grossly normal to inspection.  No discharge or erythema, or obvious scleral/conjunctival abnormalities.  RESP: No audible wheeze, cough, or visible cyanosis.    SKIN: Visible skin clear. No " significant rash, abnormal pigmentation or lesions.  NEURO: Cranial nerves grossly intact.  Mentation and speech appropriate for age.  PSYCH: Appropriate affect, tone, and pace of words        Sincerely,    Grisel Baxter PA-C      30 minutes spent by me on the date of the encounter doing chart review, history and exam, documentation and further activities per the note

## 2024-03-27 NOTE — LETTER
3/27/2024       RE: Charlotte Arredondo  [address] [place] [address]     Dear Colleague,    Thank you for referring your patient, Charlotte Arredondo, to the [facility] WEIGHT MANAGEMENT CLINIC [place] at [facility]. Please see a copy of my visit note below.    Virtual Visit Details    Type of service:  Video Visit     Originating Location (pt. Location): Home    Distant Location (provider location):  Off-site  Platform used for Video Visit: [facility] Medical Weight Management Note     Charlotte Arredondo  MRN:  [id number]  :  1960  JUSTICE:  3/27/2024    Dear Marybel Aguiar MD,    I had the pleasure of seeing your patient Charlotte Arredondo. She is a 63 year old female who I am continuing to see for treatment of obesity related to:        2022     4:38 PM   --   I have the following health issues associated with obesity Pre-Diabetes    High Cholesterol    Sleep Apnea    Stress Incontinence   I have the following symptoms associated with obesity Knee Pain    Depression    Back Pain    Fatigue       Assessment & Plan  Problem List Items Addressed This Visit    None  Visit Diagnoses       Class 2 severe obesity with serious comorbidity and body mass index (BMI) of 35.0 to 35.9 in adult, unspecified obesity type (H)    -  Primary    Relevant Medications    tirzepatide-Weight Management (ZEPBOUND) 7.5 MG/0.5ML prefilled pen           Transition to Zepbound 7.5mg once weekly  Continue Wegovy 2.4mg once weekly until Zepbound is approved. Stop wegovy 1 week prior to starting zepbound   Grisel Mathias in 3-4 months.     INTERVAL HISTORY:  New MWM - 2022 - Started Saxenda  2023 - wanted to continue Saxenda 3.0mg once daily.   Patient reached out via [employer] 2023 that Saxenda was no longer helpful with hunger, and would like to transition to Wegovy   Transitioned to Wegovy 1.7mg once weekly   2/15/2023 - continued Wegovy  1.7mg once weekly   Reached out via [employer] 2/28/2023 and increase to Wegovy 2.4mg   Continued Wegovy 2.4mg       Still losing weight, but slower over the past couple of months.     Just got over Noravirus.     New insurance has continued to cover weight loss medications.     Anti-obesity medication history    Current:   Wegovy 2.4mg - no side effects.     Is interested in transitioning to Zepbound. Feels like having more food noise over the past couple of months    Past/Failed/contraindicated:   - Phentermine - previously tried. Side effects of insomnia and increased anxiety.   - Topiramate - concern for brain fog   - Naltrexone - currently talking opioids as needed for chronic pain    Recent diet changes: Still having some increase hunger in the night time. Trying to mindful of snacks. Drinking 60-90oz daily. Eating 2-3 meals a day.     Recent exercise/activity changes: Was in [place] and did a lot of walking. Recent low back pain, which has limited mobility. Has been harder with the recent snow as well.     Recent stressors: Has retired since last visit. Has been really nice. Has a granddaughter due in August.       CURRENT WEIGHT:   198 lbs 0 oz    Initial Weight (lbs): 242 lbs  Last Visits Weight: 92.1 kg (203 lb)  Cumulative weight loss (lbs): 44  Weight Loss Percentage: 18.18%    Wt Readings from Last 5 Encounters:   03/27/24 89.8 kg (198 lb)   11/21/23 92.1 kg (203 lb)   10/06/23 93.9 kg (207 lb)   07/20/23 93.9 kg (207 lb)   04/14/23 99.7 kg (219 lb 12.8 oz)             3/26/2024     1:07 PM   Changes and Difficulties   I have made the following changes to my diet since my last visit: Still reducing fat and sugar intake   With regards to my diet, I am still struggling with: late night eating   I have made the following changes to my activity/exercise since my last visit: regular exercise         MEDICATIONS:   Current Outpatient Medications   Medication Sig Dispense Refill    acetaminophen (TYLENOL) 650 MG  "CR tablet Take 1,300 mg by mouth every 8 hours as needed       CALCIUM + D OR 1 TABLET DAILY      FLUoxetine (PROZAC) 20 MG capsule Take 1 capsule (20 mg) by mouth daily 90 capsule 4    HYDROcodone-acetaminophen (NORCO)  MG per tablet Take 1 tablet by mouth every 6 hours as needed for severe pain DO NOT combine with lorazepam. 30 tablet 0    ibuprofen 200 MG capsule Take 200 mg by mouth every 4 hours as needed for fever      LORazepam (ATIVAN) 0.5 MG tablet Take 1 tablet (0.5 mg) by mouth every 6 hours as needed for anxiety 20 tablet 0    meloxicam (MOBIC) 7.5 MG tablet Take 1 tablet (7.5 mg) by mouth daily 90 tablet 3    methocarbamol (ROBAXIN) 750 MG tablet TAKE ONE TO ONE AND ONE-HALF TABLETS BY MOUTH THREE TIMES DAILY AS NEEDED FOR MUSCLE SPASMS 45 tablet 1    MULTIVITAMINS OR 1 daily       omeprazole (PRILOSEC) 20 MG DR capsule Take 1 capsule (20 mg) by mouth daily 90 capsule 4    oxyBUTYnin ER (DITROPAN XL) 15 MG 24 hr tablet Take 1 tablet (15 mg) by mouth daily 90 tablet 4    Semaglutide-Weight Management (WEGOVY) 2.4 MG/0.75ML pen Inject 2.4 mg Subcutaneous once a week 9 mL 1    tirzepatide-Weight Management (ZEPBOUND) 7.5 MG/0.5ML prefilled pen Inject 0.5 mLs (7.5 mg) Subcutaneous every 7 days 2 mL 3    Vitamin D3 (CHOLECALCIFEROL) 125 MCG (5000 UT) tablet 6000 UNITs      biotin 1000 MCG TABS tablet Take 1,000 mcg by mouth daily (Patient not taking: Reported on 3/27/2024)             3/26/2024     1:07 PM   Weight Loss Medication History Reviewed With Patient   Which weight loss medications are you currently taking on a regular basis? Wegovy   Are you having any side effects from the weight loss medication that we have prescribed you? No               10/11/2010     5:00 PM 3/7/2018     4:00 PM   SCOTT Score (Last Two)   CSOTT Raw Score 43 36   Activation Score 68.5 75.5   SCOTT Level 4 4           Objective   Ht 1.702 m (5' 7\")   Wt 89.8 kg (198 lb)   LMP 03/06/2011   BMI 31.01 kg/m      Vitals - " Patient Reported  Pain Score: Severe Pain (7)  Pain Loc: Mid Back      PHYSICAL EXAM:    GENERAL: alert and no distress  EYES: Eyes grossly normal to inspection.  No discharge or erythema, or obvious scleral/conjunctival abnormalities.  RESP: No audible wheeze, cough, or visible cyanosis.    SKIN: Visible skin clear. No significant rash, abnormal pigmentation or lesions.  NEURO: Cranial nerves grossly intact.  Mentation and speech appropriate for age.  PSYCH: Appropriate affect, tone, and pace of words        Sincerely,    Grisel Baxter PA-C      30 minutes spent by me on the date of the encounter doing chart review, history and exam, documentation and further activities per the note

## 2024-03-27 NOTE — PROGRESS NOTES
"Virtual Visit Details    Type of service:  Video Visit     Originating Location (pt. Location): {video visit patient location:308040::\"Home\"}  {PROVIDER LOCATION On-site should be selected for visits conducted from your clinic location or adjoining [facility] hospital, academic office, or other nearby [facility] building. Off-site should be selected for all other provider locations, including home:[id number]}  Distant Location (provider location):  {virtual location provider:520640}  Platform used for Video Visit: {Virtual Visit Platforms:206784::\"[employer]\"}    "

## 2024-03-27 NOTE — PATIENT INSTRUCTIONS
"Cole Montero,   Thank you for allowing us the privilege of caring for you. We hope we provided you with the excellent service you deserve.   Please let us know if there is anything else we can do for you so that we can be sure you are completely satisfied with your care experience.    To ensure the quality of our services you may be receiving a patient satisfaction survey from an independent patient satisfaction monitoring company.    The greatest compliment you can give is a \"Likely to Recommend\"    Your visit was with Grisel Baxter PA-C today.    Instructions per today's visit:     Transition to Zepbound 7.5mg once weekly  Continue Wegovy 2.4mg once weekly until Zepbound is approved. Stop wegovy 1 week prior to starting zepbound   Grisel Mathias in 3-4 months.     ___________________________________________________________________________  Important contact and scheduling information:  Please call our contact center at [phone] to schedule your next appointments.  For any nursing questions or concerns call Mary Salazar LPN at [phone] or Miguelina Gan RN at [phone]  Please call during clinic hours Monday through Friday 8:00a - 4:00p if you have questions or you can contact us via [employer] at anytime and we will reply during clinic hours.    Lab results will be communicated through My Chart or letter (if My Chart not used). Please call the clinic if you have not received communication after 1 week or if you have any questions.?  Clinic Fax: [phone]  __________________________________________________________________________    If labs were ordered today:    Please make an appointment to have them drawn at your convenience.     To schedule the Lab Appointment using [employer]:  Select \"Schedule an Appointment\"  Select \"Lab Only\"  For \"A couple of questions\", select \"Other\"  For \"Which locations work for you?, select the location and set up the appointment    To schedule by phone call [phone] to " schedule a lab only appointment at any [facility] lab.  ___________________________________________________________________________  Work with A Health !  Virtual Sessions are Available through [facility] Weight Management Clinics    To learn more, call to schedule a free, Health  Q&A appointment: [phone]     What is Health Coaching?  Do you know what you are supposed to do, but you just aren't doing it?  Then, HEALTH COACHING may help you!   Get unstuck and move forward with the support of a professionally trained NBC-HWC (National Board-Certified Health and ) who uses evidence-based approaches to help you move forward with healthy lifestyle changes in the areas of weight loss, stress management and overall well-being.    Health Coaches help you identify goals that will work best for you. Health Coaches provide support and encouragement with overcoming barriers and help you to find inspiration and motivation to lead a healthy lifestyle.    Option one:  Health Coaching 3-Pack; Three, 30-minute Health Coaching Visits, for $99  Visits are done virtually (phone or video)  This is a self pay service; we do not accept insurance for terri coaching.    Option two:   The 24 week Plan; 11 Health Coaching Visits, and a 7 months subscription to [employer]-- on-demand fitness, nutrition and mindfulness classes, for $499 (employee discounts may be available). Participants will also meet regularly with a weight management Medical Provider and a Registered/Licensed Dietician.  This is a self-pay service; we do not accept insurance for health coaching.    To Schedule a free Health  Q&A appointment to learn more,  call [phone].  ____________________________________________________________________  [facility]  Healthy Lifestyle Group    Healthy Lifestyle Group  This is a 60 minute virtual coaching group for those who want to lead a  "healthier lifestyle. Come together to set goals and overcome barriers in a supportive group environment. We will address the four pillars of health--nutrition, exercise, sleep and emotional well-being.  This group is highly recommended for those who are participating in the 24 week Healthy Lifestyle Plan and our Health Coaching sessions.    WHEN: This group meets the first Friday of the month, 12:30 PM - 1:30 PM online, via a zoom meeting.      FACILITATOR: Led by National Board Certified Health and , Cecilia De La Rosa [facility].    TO REGISTER: Please call the Call Center at [phone] to register. You will get an appointment to attend in [facility]. Fifteen minutes prior to the meeting, complete the e-check in and you will get the link to join the meeting.  There is no charge to attend this group and space is limited.      2023 and 2024 Meeting Topics and Dates:    November 3: Introduction to Mindfulness (Learn simple and effective mindfulness practices and how it can benefit you)    December 8: Let's Talk (guided discussion on our wins and challenges)    January 5: New Years Vision: Manifest your Best 2024! (Guided imagery,  journaling and discussion)    February 2: Let's Talk    March 1: 10 Percent Happier by Shai Ballard (Book Bites; a guided discussion on the nuggets of wisdom from favorite wellness books; no need to read the book but highly encouraged)    April 5: Let's Talk    May 3: \"Essentialism; The Disciplined Pursuit of Less by Anders Crawford (book bites discussion)    June 7: Let's Talk    July 5: NO MEETING, off for the 4th of July Holiday    August 2: The Blue Zones, Secrets for Living a Longer Life by Shai Marin (book bites discussion)      If you would like bariatric surgery specific support group info please let your care team know.         Thank you,   [facility] Comprehensive Weight Management Team        Zepbound (Tirzepatide)    Zepbound (Tirzepatide): is an injectable prescription " medicine recently FDA approved for adults with obesity or overweight with an additional condition affected by their weight (type 2 diabetes, high blood pressure, high cholesterol, obstructive sleep apnea, etc). Zepbound contains the same active ingredient as what is in Mounjaro, an injectable FDA approved for Type 2 Diabtes. Zepbound is intended to be used along with dietary/behavioral changes and consistent exercise to improve assist with weight loss and other health improvement outcomes.     It is given as a shot once a week. It activates the body's receptors for GIP (glucose-dependent insulinotropic polypeptide) and GLP-1 (glucagon-like peptide-1) receptor, two naturally occurring hormones that help tell the brain that you are full. It also works is by slowing down how quickly food leaves your stomach. What this means for you: You will start to feel crawford more quickly, notice portion changes and eat less often between meals. It is still important to maintain consistent eating schedule with meals +/- snacks and get in good hydration.      Dosing:  Initial dose: 2.5 mg injected subcutaneously once weekly for 4 weeks  Further dose changes can include: increase to 5 mg once weekly for 4 weeks, then 7.5 mg once weekly for 4 weeks, then 10 mg once weekly for 4 weeks then 12.5 mg once weekly for 4 weeks, then 15 mg (maximum dose) once weekly.    Dose changes are based on how you are tolerating the current dose, what benefits you are seeing at the current dose and if improvement in effectiveness of the drug is needed. The goal is to find the dose that works best for you with the least amount of side effects. You may find you reach this at a lower dose than the maximum dose.     Side effects: Patients are advised to eat more slowly and purposefully. Give yourself less portions. You may find after starting this medication you have a new point of fullness. It is also important to stay adequately hydrated on the medication  to reduce risks of some of these side effects. Many of these side effects (nausea, diarrhea, etc) occurred during dose escalation but did improve over time with continuing the medication. If side effects are unmanageable, reach out to your prescribing provider.     The risk of pancreatitis (inflammation of the pancreas) has been associated with this type of medication, but is very rare.  If you have had pancreatitis in the past, this medication may not be for you. Please let us know about any past history of pancreas problems. If you experience persistent severe abdominal pain (sometimes radiating to the back potentially accompanied by vomiting and have a fever), stop the medication and contact your provider immediately for assessment. They will do a blood test to check for pancreatitis.       How to Inject:   https://www.zepbound.[employer].com/how-to-use    Storage:   Store your pen in the refrigerator. You may store your pen at room temperature for up to 21 days. If you store the pen at room temperature, do not return the pen to the refrigerator. Discard the pen if not used within 21 days after removing from the refrigerator.      For any questions or concerns please send a Locality message to our team or call our weight management call center at [phone] during regular business hours.

## 2024-03-27 NOTE — TELEPHONE ENCOUNTER
PA Initiation    Medication: ZEPBOUND 7.5 MG/0.5ML SC SOAJ  Insurance Company: [employer] - Phone [phone] Fax [phone]  Pharmacy Filling the Rx: [facility] [place], [place] - [address]  Filling Pharmacy Phone: [phone]  Filling Pharmacy Fax: [phone]  Start Date: 3/27/2024         Thank you,     Kimo Moscoso [facility]  Pharmacy Clinic [facility]  [email or web address]   Phone: [phone]  Fax: [phone]

## 2024-03-27 NOTE — NURSING NOTE
Is the patient currently in the state of [place]? YES    Visit mode:VIDEO    If the visit is dropped, the patient can be reconnected by: VIDEO VISIT: Text to cell phone:   Telephone Information:   Mobile [phone]       Will anyone else be joining the visit? NO  (If patient encounters technical issues they should call [phone] :[phone])    How would you like to obtain your AVS? MyChart    Are changes needed to the allergy or medication list? No    Reason for visit: RECHECK    Pt states 7/10 mid back pain (chronic low back) but in past 3 days moved to mid back - possibly due to shoveling.    Олег BABBF

## 2024-03-28 NOTE — TELEPHONE ENCOUNTER
Prior Authorization Approval    Medication: ZEPBOUND 7.5 MG/0.5ML SC SOAJ  Authorization Effective Date: 3/27/2024  Authorization Expiration Date: 11/27/2024  Approved Dose/Quantity: 2 ml per 28 days  Reference #: [id number]   Insurance Company: [employer] - Phone [phone] Fax [phone]  Expected CoPay: $ 30  CoPay Card Available:      Financial Assistance Needed: No  Which Pharmacy is filling the prescription: [facility] [address]  Pharmacy Notified: Yes  Patient Notified: Yes - via MyChart        Thank you,     Kimo Moscoso [facility]  Pharmacy Clinic [facility] [email or web address]   Phone: [phone]  Fax: [phone]

## 2024-04-01 ENCOUNTER — TELEPHONE (OUTPATIENT)
Dept: ENDOCRINOLOGY | Facility: CLINIC | Age: 64
End: 2024-04-01
Payer: COMMERCIAL

## 2024-04-01 NOTE — TELEPHONE ENCOUNTER
Patient Contacted and scheduled the following:    Appointment type: KETURAH YE   Provider: Grisel Baxter PA-C  Return date: 06/27/2024 and 11/01/2024   Specialty phone number: [phone]  Additional appointment(s) needed: no   Additonal Notes: OK to use NBS, per Grisel Mathias

## 2024-04-18 ENCOUNTER — MYC MEDICAL ADVICE (OUTPATIENT)
Dept: ENDOCRINOLOGY | Facility: CLINIC | Age: 64
End: 2024-04-18
Payer: COMMERCIAL

## 2024-04-18 DIAGNOSIS — E66.01 CLASS 2 SEVERE OBESITY WITH SERIOUS COMORBIDITY AND BODY MASS INDEX (BMI) OF 35.0 TO 35.9 IN ADULT, UNSPECIFIED OBESITY TYPE (H): Primary | ICD-10-CM

## 2024-04-18 DIAGNOSIS — R73.03 PRE-DIABETES: ICD-10-CM

## 2024-04-18 DIAGNOSIS — E66.812 CLASS 2 SEVERE OBESITY WITH SERIOUS COMORBIDITY AND BODY MASS INDEX (BMI) OF 35.0 TO 35.9 IN ADULT, UNSPECIFIED OBESITY TYPE (H): Primary | ICD-10-CM

## 2024-05-01 DIAGNOSIS — F11.90 CHRONIC, CONTINUOUS USE OF OPIOIDS: ICD-10-CM

## 2024-05-01 DIAGNOSIS — M54.50 CHRONIC LOW BACK PAIN WITHOUT SCIATICA, UNSPECIFIED BACK PAIN LATERALITY: ICD-10-CM

## 2024-05-01 DIAGNOSIS — G89.29 CHRONIC LOW BACK PAIN WITHOUT SCIATICA, UNSPECIFIED BACK PAIN LATERALITY: ICD-10-CM

## 2024-05-01 RX ORDER — HYDROCODONE BITARTRATE AND ACETAMINOPHEN 10; 325 MG/1; MG/1
1 TABLET ORAL EVERY 6 HOURS PRN
Qty: 30 TABLET | Refills: 0 | Status: SHIPPED | OUTPATIENT
Start: 2024-05-01 | End: 2024-06-14

## 2024-05-03 ENCOUNTER — OFFICE VISIT (OUTPATIENT)
Dept: FAMILY MEDICINE | Facility: CLINIC | Age: 64
End: 2024-05-03
Payer: COMMERCIAL

## 2024-05-03 VITALS
HEART RATE: 69 BPM | TEMPERATURE: 97.7 F | HEIGHT: 67 IN | WEIGHT: 203 LBS | RESPIRATION RATE: 16 BRPM | DIASTOLIC BLOOD PRESSURE: 86 MMHG | SYSTOLIC BLOOD PRESSURE: 134 MMHG | OXYGEN SATURATION: 97 % | BODY MASS INDEX: 31.86 KG/M2

## 2024-05-03 DIAGNOSIS — R87.618 PAP SMEAR ABNORMALITY OF CERVIX/HUMAN PAPILLOMAVIRUS (HPV) POSITIVE: Primary | ICD-10-CM

## 2024-05-03 DIAGNOSIS — B00.9 HSV (HERPES SIMPLEX VIRUS) INFECTION: ICD-10-CM

## 2024-05-03 PROCEDURE — 99213 OFFICE O/P EST LOW 20 MIN: CPT | Mod: 25 | Performed by: FAMILY MEDICINE

## 2024-05-03 PROCEDURE — 90715 TDAP VACCINE 7 YRS/> IM: CPT | Performed by: FAMILY MEDICINE

## 2024-05-03 PROCEDURE — 90471 IMMUNIZATION ADMIN: CPT | Performed by: FAMILY MEDICINE

## 2024-05-03 PROCEDURE — 87624 HPV HI-RISK TYP POOLED RSLT: CPT | Performed by: FAMILY MEDICINE

## 2024-05-03 PROCEDURE — G0123 SCREEN CERV/VAG THIN LAYER: HCPCS | Performed by: FAMILY MEDICINE

## 2024-05-03 RX ORDER — OXYBUTYNIN CHLORIDE 15 MG/1
15 TABLET, EXTENDED RELEASE ORAL DAILY
Qty: 90 TABLET | Refills: 4 | Status: CANCELLED | OUTPATIENT
Start: 2024-05-03

## 2024-05-03 RX ORDER — MELOXICAM 7.5 MG/1
7.5 TABLET ORAL DAILY
Qty: 90 TABLET | Refills: 3 | Status: CANCELLED | OUTPATIENT
Start: 2024-05-03

## 2024-05-03 RX ORDER — METHOCARBAMOL 750 MG/1
750 TABLET, FILM COATED ORAL 3 TIMES DAILY PRN
Qty: 45 TABLET | Refills: 1 | Status: CANCELLED | OUTPATIENT
Start: 2024-05-03

## 2024-05-03 RX ORDER — ACYCLOVIR 50 MG/G
CREAM TOPICAL
Status: SHIPPED
Start: 2024-05-03

## 2024-05-03 ASSESSMENT — PATIENT HEALTH QUESTIONNAIRE - PHQ9
SUM OF ALL RESPONSES TO PHQ QUESTIONS 1-9: 0
SUM OF ALL RESPONSES TO PHQ QUESTIONS 1-9: 0

## 2024-05-03 ASSESSMENT — PAIN SCALES - GENERAL: PAINLEVEL: NO PAIN (0)

## 2024-05-03 NOTE — PATIENT INSTRUCTIONS
Preventive Care Advice   This is general advice given by our system to help you stay healthy. However, your care team may have specific advice just for you. Please talk to your care team about your preventive care needs.  Nutrition  Eat 5 or more servings of fruits and vegetables each day.  Try wheat bread, brown rice and whole grain pasta (instead of white bread, rice, and pasta).  Get enough calcium and vitamin D. Check the label on foods and aim for 100% of the RDA (recommended daily allowance).  Lifestyle  Exercise at least 150 minutes each week   (30 minutes a day, 5 days a week).  Do muscle strengthening activities 2 days a week. These help control your weight and prevent disease.  No smoking.  Wear sunscreen to prevent skin cancer.  Have a dental exam and cleaning every 6 months.  Yearly exams  See your health care team every year to talk about:  Any changes in your health.  Any medicines your care team has prescribed.  Preventive care, family planning, and ways to prevent chronic diseases.  Shots (vaccines)   HPV shots (up to age 26), if you've never had them before.  Hepatitis B shots (up to age 59), if you've never had them before.  COVID-19 shot: Get this shot when it's due.  Flu shot: Get a flu shot every year.  Tetanus shot: Get a tetanus shot every 10 years.  Pneumococcal, hepatitis A, and RSV shots: Ask your care team if you need these based on your risk.  Shingles shot (for age 50 and up).  General health tests  Diabetes screening:  Starting at age 35, Get screened for diabetes at least every 3 years.  If you are younger than age 35, ask your care team if you should be screened for diabetes.  Cholesterol test: At age 39, start having a cholesterol test every 5 years, or more often if advised.  Bone density scan (DEXA): At age 50, ask your care team if you should have this scan for osteoporosis (brittle bones).  Hepatitis C: Get tested at least once in your life.  STIs (sexually transmitted  infections)  Before age 24: Ask your care team if you should be screened for STIs.  After age 24: Get screened for STIs if you're at risk. You are at risk for STIs (including HIV) if:  You are sexually active with more than one person.  You don't use condoms every time.  You or a partner was diagnosed with a sexually transmitted infection.  If you are at risk for HIV, ask about PrEP medicine to prevent HIV.  Get tested for HIV at least once in your life, whether you are at risk for HIV or not.  Cancer screening tests  Cervical cancer screening: If you have a cervix, begin getting regular cervical cancer screening tests at age 21. Most people who have regular screenings with normal results can stop after age 65. Talk about this with your provider.  Breast cancer scan (mammogram): If you've ever had breasts, begin having regular mammograms starting at age 40. This is a scan to check for breast cancer.  Colon cancer screening: It is important to start screening for colon cancer at age 45.  Have a colonoscopy test every 10 years (or more often if you're at risk) Or, ask your provider about stool tests like a FIT test every year or Cologuard test every 3 years.  To learn more about your testing options, visit: https://www.ishBowl/395852.pdf.  For help making a decision, visit: https://bit.ly/lp77291.  Prostate cancer screening test: If you have a prostate and are age 55 to 69, ask your provider if you would benefit from a yearly prostate cancer screening test.  Lung cancer screening: If you are a current or former smoker age 50 to 80, ask your care team if ongoing lung cancer screenings are right for you.  For informational purposes only. Not to replace the advice of your health care provider. Copyright   2023 [employer]. All rights reserved. Clinically reviewed by the [facility] Transitions Program. [employer] [id number] - REV 01/24.

## 2024-05-13 ENCOUNTER — PATIENT OUTREACH (OUTPATIENT)
Dept: FAMILY MEDICINE | Facility: CLINIC | Age: 64
End: 2024-05-13
Payer: COMMERCIAL

## 2024-05-21 DIAGNOSIS — E66.01 CLASS 2 SEVERE OBESITY WITH SERIOUS COMORBIDITY AND BODY MASS INDEX (BMI) OF 35.0 TO 35.9 IN ADULT, UNSPECIFIED OBESITY TYPE (H): ICD-10-CM

## 2024-05-21 DIAGNOSIS — R73.03 PRE-DIABETES: ICD-10-CM

## 2024-05-21 DIAGNOSIS — E66.812 CLASS 2 SEVERE OBESITY WITH SERIOUS COMORBIDITY AND BODY MASS INDEX (BMI) OF 35.0 TO 35.9 IN ADULT, UNSPECIFIED OBESITY TYPE (H): ICD-10-CM

## 2024-05-25 ENCOUNTER — HEALTH MAINTENANCE LETTER (OUTPATIENT)
Age: 64
End: 2024-05-25

## 2024-06-17 ENCOUNTER — MYC MEDICAL ADVICE (OUTPATIENT)
Dept: FAMILY MEDICINE | Facility: CLINIC | Age: 64
End: 2024-06-17
Payer: COMMERCIAL

## 2024-06-17 DIAGNOSIS — G89.29 CHRONIC LOW BACK PAIN WITHOUT SCIATICA, UNSPECIFIED BACK PAIN LATERALITY: Primary | ICD-10-CM

## 2024-06-17 DIAGNOSIS — M47.816 LUMBAR FACET ARTHROPATHY: ICD-10-CM

## 2024-06-17 DIAGNOSIS — M51.369 DDD (DEGENERATIVE DISC DISEASE), LUMBAR: ICD-10-CM

## 2024-06-17 DIAGNOSIS — M54.50 CHRONIC LOW BACK PAIN WITHOUT SCIATICA, UNSPECIFIED BACK PAIN LATERALITY: Primary | ICD-10-CM

## 2024-06-17 PROBLEM — Z71.89 ADVANCED DIRECTIVES, COUNSELING/DISCUSSION: Status: RESOLVED | Noted: 2020-02-19 | Resolved: 2024-06-17

## 2024-06-19 NOTE — TELEPHONE ENCOUNTER
There are back braces she could try.  May not be covered by insurance.  I printed a DME order.  Please be sure she is aware to check with her insurance regarding coverage.

## 2024-06-19 NOTE — TELEPHONE ENCOUNTER
There are back braces that she can try.  They may not be covered by insurance.  I did put in a DME order for this but she should contact her insurance to check on coverage.DME (Durable Medical Equipment) Orders and Documentation  Orders Placed This Encounter   Procedures    Neck/Shoulder/Back/Abdomen Bracing Supplies Order Lumbosacral Brace        The patient was assessed and it was determined the patient is in need of the following listed DME Supplies/Equipment. Please complete supporting documentation below to demonstrate medical necessity.

## 2024-06-20 NOTE — TELEPHONE ENCOUNTER
Did her insurance company indicate what diagnoses might be covered?    We could do DDD (degenerative disc disease), lumbar facet arthropathy.    I added those diagnoses to this note.

## 2024-06-20 NOTE — TELEPHONE ENCOUNTER
Patient called in regards to DME. Provider number to [facility]. Patient will  printed DME form at .    ALKA Sullivan [facility]

## 2024-06-20 NOTE — TELEPHONE ENCOUNTER
Patient and MYA Hollis, calling in regards to message below.    Will await providers response.    Darius ROMAN RN  [facility]

## 2024-06-24 DIAGNOSIS — M54.50 CHRONIC LOW BACK PAIN WITHOUT SCIATICA, UNSPECIFIED BACK PAIN LATERALITY: ICD-10-CM

## 2024-06-24 DIAGNOSIS — G89.29 CHRONIC LOW BACK PAIN WITHOUT SCIATICA, UNSPECIFIED BACK PAIN LATERALITY: ICD-10-CM

## 2024-06-24 DIAGNOSIS — E66.01 CLASS 2 SEVERE OBESITY WITH SERIOUS COMORBIDITY AND BODY MASS INDEX (BMI) OF 35.0 TO 35.9 IN ADULT, UNSPECIFIED OBESITY TYPE (H): ICD-10-CM

## 2024-06-24 DIAGNOSIS — R11.0 NAUSEA: Primary | ICD-10-CM

## 2024-06-24 DIAGNOSIS — E66.812 CLASS 2 SEVERE OBESITY WITH SERIOUS COMORBIDITY AND BODY MASS INDEX (BMI) OF 35.0 TO 35.9 IN ADULT, UNSPECIFIED OBESITY TYPE (H): ICD-10-CM

## 2024-06-24 RX ORDER — METHOCARBAMOL 750 MG/1
TABLET, FILM COATED ORAL
Qty: 45 TABLET | Refills: 1 | Status: SHIPPED | OUTPATIENT
Start: 2024-06-24

## 2024-06-24 RX ORDER — ONDANSETRON 4 MG/1
4 TABLET, ORALLY DISINTEGRATING ORAL EVERY 8 HOURS PRN
Qty: 20 TABLET | Refills: 0 | Status: SHIPPED | OUTPATIENT
Start: 2024-06-24

## 2024-06-24 NOTE — TELEPHONE ENCOUNTER
"Pt spoke with Shai at medical supply they need a diagnosis code that is not \" pain\"  but what may have caused it.. so the  previous suggestions will work to send through to insurance,  that does not guarantee coverage however.   Could possibly be purchased online for less.  Cate Velasquez RN on 6/24/2024 at 2:56 PM   "

## 2024-06-27 ENCOUNTER — VIRTUAL VISIT (OUTPATIENT)
Dept: ENDOCRINOLOGY | Facility: CLINIC | Age: 64
End: 2024-06-27
Payer: COMMERCIAL

## 2024-06-27 VITALS — WEIGHT: 201 LBS | HEIGHT: 67 IN | BODY MASS INDEX: 31.55 KG/M2

## 2024-06-27 DIAGNOSIS — E66.812 CLASS 2 SEVERE OBESITY WITH SERIOUS COMORBIDITY AND BODY MASS INDEX (BMI) OF 35.0 TO 35.9 IN ADULT, UNSPECIFIED OBESITY TYPE (H): Primary | ICD-10-CM

## 2024-06-27 DIAGNOSIS — E66.01 CLASS 2 SEVERE OBESITY WITH SERIOUS COMORBIDITY AND BODY MASS INDEX (BMI) OF 35.0 TO 35.9 IN ADULT, UNSPECIFIED OBESITY TYPE (H): Primary | ICD-10-CM

## 2024-06-27 PROCEDURE — G2211 COMPLEX E/M VISIT ADD ON: HCPCS | Mod: 95

## 2024-06-27 PROCEDURE — 99213 OFFICE O/P EST LOW 20 MIN: CPT | Mod: 95

## 2024-06-27 ASSESSMENT — PAIN SCALES - GENERAL: PAINLEVEL: MILD PAIN (3)

## 2024-06-27 NOTE — LETTER
2024       RE: Charlotte Arredondo  [address] [place] [address]     Dear Colleague,    Thank you for referring your patient, Charlotte Arredondo, to the [facility] WEIGHT MANAGEMENT CLINIC [place] at [facility]. Please see a copy of my visit note below.    Virtual Visit Details    Type of service:  Video Visit     Originating Location (pt. Location): Home    Distant Location (provider location):  Off-site  Platform used for Video Visit: [facility] Medical Weight Management Note     Charlotte Arredondo  MRN:  [id number]  :  1960  JUSTICE:  2024    Dear Marybel Aguiar MD,    I had the pleasure of seeing your patient Charlotte Arredondo. She is a 63 year old female who I am continuing to see for treatment of obesity related to:        2022     4:38 PM   --   I have the following health issues associated with obesity Pre-Diabetes    High Cholesterol    Sleep Apnea    Stress Incontinence   I have the following symptoms associated with obesity Knee Pain    Depression    Back Pain    Fatigue       Assessment & Plan  Problem List Items Addressed This Visit    None  Visit Diagnoses       Class 2 severe obesity with serious comorbidity and body mass index (BMI) of 35.0 to 35.9 in adult, unspecified obesity type (H)    -  Primary             Continue Zepbound 12.5mg once weekly. No refills needed. Can dose increase prior to next visit if needed.   rGisel Mathias on 2024      INTERVAL HISTORY:  New MWM - 2022 - Started Saxenda  2023 - wanted to continue Saxenda 3.0mg once daily.   Patient reached out via [employer] 2023 that Saxenda was no longer helpful with hunger, and would like to transition to Wegovy   Transitioned to Wegovy 1.7mg once weekly   2/15/2023 - continued Wegovy 1.7mg once weekly   Reached out via [employer] 2023 and increase to Wegovy 2.4mg   Continued Wegovy 2.4mg   Transitioned to Zepbound        Had gotten down to 195lb, but when out of zepbound saw weight regain. Goal is to lose around 20lbs     Anti-obesity medication history    Current:   Zepbound 12.5mg - took first injection on tuesday. Mild nausea, but otherwise no side effects.   Overall feels like it has been a better fit compared to Wegovy.     Was off Zepbound 10mg for 2 weeks due to shortages. During that time was on Saxenda.     Recent diet changes: Had increase hunger when off of Zepbound. Not having hunger at night any more. Eating 2 meals a day, with minimal snacking. Decrease in snacking in general. Drinking water 60oz daily. Portion sizes are well controlled.     Recent exercise/activity changes: has been having some knee and back pain. So, activity has been more limited. Has been gardening. But continues to be hard to walk. Is trying to get a back brace.       CURRENT WEIGHT:   201 lbs 0 oz    Initial Weight (lbs): 242 lbs     Cumulative weight loss (lbs): 41  Weight Loss Percentage: 16.94%    Wt Readings from Last 5 Encounters:   06/27/24 91.2 kg (201 lb)   05/03/24 92.1 kg (203 lb)   03/27/24 89.8 kg (198 lb)   11/21/23 92.1 kg (203 lb)   10/06/23 93.9 kg (207 lb)             6/24/2024    10:08 AM   Changes and Difficulties   I have made the following changes to my diet since my last visit: Still continuing to make healthy choices   With regards to my diet, I am still struggling with: Definitely noticed difference in my food desires with lack of medicine   I have made the following changes to my activity/exercise since my last visit: Still staying active. Lots of gardenibg, yard work and walking         MEDICATIONS:   Current Outpatient Medications   Medication Sig Dispense Refill    acetaminophen (TYLENOL) 650 MG CR tablet Take 1,300 mg by mouth every 8 hours as needed       acyclovir (ZOVIRAX) 5 % external cream Apply  topically. Apply to affected area thinly five times daily at the first sign of symptoms.      CALCIUM + D OR 1  "TABLET DAILY      FLUoxetine (PROZAC) 20 MG capsule Take 1 capsule (20 mg) by mouth daily 90 capsule 4    HYDROcodone-acetaminophen (NORCO)  MG per tablet TAKE ONE TABLET BY MOUTH EVERY 6 HOURS AS NEEDED FOR SEVERE PAIN 30 tablet 0    ibuprofen 200 MG capsule Take 200 mg by mouth every 4 hours as needed for fever      LORazepam (ATIVAN) 0.5 MG tablet Take 1 tablet (0.5 mg) by mouth every 6 hours as needed for anxiety 20 tablet 0    meloxicam (MOBIC) 7.5 MG tablet Take 1 tablet (7.5 mg) by mouth daily 90 tablet 3    methocarbamol (ROBAXIN) 750 MG tablet TAKE ONE TO ONE AND ONE-HALF TABLETS BY MOUTH THREE TIMES DAILY AS NEEDED FOR MUSCLE SPASMS 45 tablet 1    MULTIVITAMINS OR 1 daily       omeprazole (PRILOSEC) 20 MG DR capsule Take 1 capsule (20 mg) by mouth daily 90 capsule 4    ondansetron (ZOFRAN ODT) 4 MG ODT tab Take 1 tablet (4 mg) by mouth every 8 hours as needed for nausea 20 tablet 0    oxyBUTYnin ER (DITROPAN XL) 15 MG 24 hr tablet Take 1 tablet (15 mg) by mouth daily 90 tablet 4    tirzepatide-Weight Management (ZEPBOUND) 10 MG/0.5ML prefilled pen Inject 0.5 mLs (10 mg) Subcutaneous every 7 days 2 mL 0    tirzepatide-Weight Management (ZEPBOUND) 12.5 MG/0.5ML prefilled pen Inject 0.5 mLs (12.5 mg) Subcutaneous every 7 days 2 mL 3    Vitamin D3 (CHOLECALCIFEROL) 125 MCG (5000 UT) tablet 6000 UNITs             6/24/2024    10:08 AM   Weight Loss Medication History Reviewed With Patient   Which weight loss medications are you currently taking on a regular basis? None   If you are not taking a weight loss medication that was prescribed to you, please indicate why: Other   Are you having any side effects from the weight loss medication that we have prescribed you? No         Objective   Ht 1.702 m (5' 7\")   Wt 91.2 kg (201 lb)   LMP 03/06/2011   BMI 31.48 kg/m      Vitals - Patient Reported  Pain Score: Mild Pain (3)  Pain Loc: Low Back      PHYSICAL EXAM:    GENERAL: alert and no distress  EYES: Eyes " grossly normal to inspection.  No discharge or erythema, or obvious scleral/conjunctival abnormalities.  RESP: No audible wheeze, cough, or visible cyanosis.    SKIN: Visible skin clear. No significant rash, abnormal pigmentation or lesions.  NEURO: Cranial nerves grossly intact.  Mentation and speech appropriate for age.  PSYCH: Appropriate affect, tone, and pace of words        Sincerely,    Grisel Baxter PA-C      20 minutes spent by me on the date of the encounter doing chart review, history and exam, documentation and further activities per the note    The longitudinal plan of care for the diagnosis(es)/condition(s) as documented were addressed during this visit. Due to the added complexity in care, I will continue to support Charlotte Montero in the subsequent management and with ongoing continuity of care.

## 2024-06-27 NOTE — NURSING NOTE
Is the patient currently in the state of [place]? YES    Visit mode:VIDEO    If the visit is dropped, the patient can be reconnected by: VIDEO VISIT: Text to cell phone:   Telephone Information:   Mobile [phone]       Will anyone else be joining the visit? NO  (If patient encounters technical issues they should call [phone]    How would you like to obtain your AVS? MyChart    Are changes needed to the allergy or medication list? Pt stated no changes to allergies and Pt stated no med changes    Are refills needed on medications prescribed by this physician? NO     Reason for visit: RECHECK    Wt other than 24 hrs:    Pain more than one location:  no  Tavia BABBF

## 2024-06-27 NOTE — PROGRESS NOTES
"Virtual Visit Details    Type of service:  Video Visit     Originating Location (pt. Location): {video visit patient location:408253::\"Home\"}  {PROVIDER LOCATION On-site should be selected for visits conducted from your clinic location or adjoining [facility] hospital, academic office, or other nearby [facility] building. Off-site should be selected for all other provider locations, including home:[id number]}  Distant Location (provider location):  {virtual location provider:416750}  Platform used for Video Visit: {Virtual Visit Platforms:685956::\"[employer]\"}  "

## 2024-06-27 NOTE — PATIENT INSTRUCTIONS
"Cole Montero,   Thank you for allowing us the privilege of caring for you. We hope we provided you with the excellent service you deserve.   Please let us know if there is anything else we can do for you so that we can be sure you are completely satisfied with your care experience.    To ensure the quality of our services you may be receiving a patient satisfaction survey from an independent patient satisfaction monitoring company.    The greatest compliment you can give is a \"Likely to Recommend\"    Your visit was with Grisel Mathias ELLIOTT Baxter today.    Instructions per today's visit:     Continue Zepbound 12.5mg once weekly. No refills needed. Can dose increase prior to next visit if needed.   Grisel Mathias on 11/1/2024    ___________________________________________________________________________  Important contact and scheduling information:  Please call our contact center at [phone] to schedule your next appointments.  For any nursing questions or concerns call Mary Salazar LPN at [phone] or Miguelina Gan RN at [phone]  Please call during clinic hours Monday through Friday 8:00a - 4:00p if you have questions or you can contact us via [employer]leader at anytime and we will reply during clinic hours.    Lab results will be communicated through My Chart or letter (if My Chart not used). Please call the clinic if you have not received communication after 1 week or if you have any questions.?  Clinic Fax: [phone]  __________________________________________________________________________    If labs were ordered today:    Please make an appointment to have them drawn at your convenience.     To schedule the Lab Appointment using [employer]leader:  Select \"Schedule an Appointment\"  Select \"Lab Only\"  For \"A couple of questions\", select \"Other\"  For \"Which locations work for you?, select the location and set up the appointment    To schedule by phone call [phone] to schedule a lab only appointment at [facility] " [facility].  ___________________________________________________________________________  Work with A Health !  Virtual Sessions are Available through [facility] Weight Management Clinics    To learn more, call to schedule a free, Health  Q&A appointment: [phone]     What is Health Coaching?  Do you know what you are supposed to do, but you just aren't doing it?  Then, HEALTH COACHING may help you!   Get unstuck and move forward with the support of a professionally trained NBC-HWC (National Board-Certified Health and ) who uses evidence-based approaches to help you move forward with healthy lifestyle changes in the areas of weight loss, stress management and overall well-being.    Health Coaches help you identify goals that will work best for you. Health Coaches provide support and encouragement with overcoming barriers and help you to find inspiration and motivation to lead a healthy lifestyle.    Option one:  Health Coaching 3-Pack; Three, 30-minute Health Coaching Visits, for $99  Visits are done virtually (phone or video)  This is a self pay service; we do not accept insurance for terri coaching.    Option two:   The 24 week Plan; 11 Health Coaching Visits, and a 7 months subscription to [employer]-- on-demand fitness, nutrition and mindfulness classes, for $499 (employee discounts may be available). Participants will also meet regularly with a weight management Medical Provider and a Registered/Licensed Dietician.  This is a self-pay service; we do not accept insurance for health coaching.    To Schedule a free Health  Q&A appointment to learn more,  call [phone].  ____________________________________________________________________  [facility]  Healthy Lifestyle Group    Healthy Lifestyle Group  This is a 60 minute virtual coaching group for those who want to lead a healthier lifestyle. Come together to set goals and  "overcome barriers in a supportive group environment. We will address the four pillars of health--nutrition, exercise, sleep and emotional well-being.  This group is highly recommended for those who are participating in the 24 week Healthy Lifestyle Plan and our Health Coaching sessions.    WHEN: This group meets the first Friday of the month, 12:30 PM - 1:30 PM online, via a zoom meeting.      FACILITATOR: Led by National Board Certified Health and , Cecilia De La Rosa, [facility].    TO REGISTER: Please call the Call Center at [phone] to register. You will get an appointment to attend in [employer]. Fifteen minutes prior to the meeting, complete the e-check in and you will get the link to join the meeting.  There is no charge to attend this group and space is limited.      2023 and 2024 Meeting Topics and Dates:    November 3: Introduction to Mindfulness (Learn simple and effective mindfulness practices and how it can benefit you)    December 8: Let's Talk (guided discussion on our wins and challenges)    January 5: New Years Vision: Manifest your Best 2024! (Guided imagery,  journaling and discussion)    February 2: Let's Talk    March 1: 10 Percent Happier by Shai Ballard (Book Bites; a guided discussion on the nuggets of wisdom from favorite wellness books; no need to read the book but highly encouraged)    April 5: Let's Talk    May 3: \"Essentialism; The Disciplined Pursuit of Less by Anders Crawford (book bites discussion)    June 7: Let's Talk    July 5: NO MEETING, off for the 4th of July Holiday    August 2: The Blue Zones, Secrets for Living a Longer Life by Shai Marin (book bites discussion)      If you would like bariatric surgery specific support group info please let your care team know.         Thank you,   [facility] Comprehensive Weight Management Team                          "

## 2024-06-27 NOTE — PROGRESS NOTES
Virtual Visit Details    Type of service:  Video Visit     Originating Location (pt. Location): Home    Distant Location (provider location):  Off-site  Platform used for Video Visit: [facility] Medical Weight Management Note     Charlotte Arredondo  MRN:  [id number]  :  1960  JUSTICE:  2024    Dear Marybel Aguiar MD,    I had the pleasure of seeing your patient Charlotte Arredondo. She is a 63 year old female who I am continuing to see for treatment of obesity related to:        2022     4:38 PM   --   I have the following health issues associated with obesity Pre-Diabetes    High Cholesterol    Sleep Apnea    Stress Incontinence   I have the following symptoms associated with obesity Knee Pain    Depression    Back Pain    Fatigue       Assessment & Plan   Problem List Items Addressed This Visit    None  Visit Diagnoses       Class 2 severe obesity with serious comorbidity and body mass index (BMI) of 35.0 to 35.9 in adult, unspecified obesity type (H)    -  Primary             Continue Zepbound 12.5mg once weekly. No refills needed. Can dose increase prior to next visit if needed.   Grisel Mathias on 2024      INTERVAL HISTORY:  New MWM - 2022 - Started Saxenda  2023 - wanted to continue Saxenda 3.0mg once daily.   Patient reached out via [employer] 2023 that Saxenda was no longer helpful with hunger, and would like to transition to Wegovy   Transitioned to Wegovy 1.7mg once weekly   2/15/2023 - continued Wegovy 1.7mg once weekly   Reached out via [employer] 2023 and increase to Wegovy 2.4mg   Continued Wegovy 2.4mg   Transitioned to Zepbound       Had gotten down to 195lb, but when out of zepbound saw weight regain. Goal is to lose around 20lbs     Anti-obesity medication history    Current:   Zepbound 12.5mg - took first injection on tuesday. Mild nausea, but otherwise no side effects.   Overall feels like it has been a better fit compared to Wegovy.     Was off Zepbound  10mg for 2 weeks due to shortages. During that time was on Saxenda.     Recent diet changes: Had increase hunger when off of Zepbound. Not having hunger at night any more. Eating 2 meals a day, with minimal snacking. Decrease in snacking in general. Drinking water 60oz daily. Portion sizes are well controlled.     Recent exercise/activity changes: has been having some knee and back pain. So, activity has been more limited. Has been gardening. But continues to be hard to walk. Is trying to get a back brace.       CURRENT WEIGHT:   201 lbs 0 oz    Initial Weight (lbs): 242 lbs     Cumulative weight loss (lbs): 41  Weight Loss Percentage: 16.94%    Wt Readings from Last 5 Encounters:   06/27/24 91.2 kg (201 lb)   05/03/24 92.1 kg (203 lb)   03/27/24 89.8 kg (198 lb)   11/21/23 92.1 kg (203 lb)   10/06/23 93.9 kg (207 lb)             6/24/2024    10:08 AM   Changes and Difficulties   I have made the following changes to my diet since my last visit: Still continuing to make healthy choices   With regards to my diet, I am still struggling with: Definitely noticed difference in my food desires with lack of medicine   I have made the following changes to my activity/exercise since my last visit: Still staying active. Lots of gardenibg, yard work and walking         MEDICATIONS:   Current Outpatient Medications   Medication Sig Dispense Refill    acetaminophen (TYLENOL) 650 MG CR tablet Take 1,300 mg by mouth every 8 hours as needed       acyclovir (ZOVIRAX) 5 % external cream Apply  topically. Apply to affected area thinly five times daily at the first sign of symptoms.      CALCIUM + D OR 1 TABLET DAILY      FLUoxetine (PROZAC) 20 MG capsule Take 1 capsule (20 mg) by mouth daily 90 capsule 4    HYDROcodone-acetaminophen (NORCO)  MG per tablet TAKE ONE TABLET BY MOUTH EVERY 6 HOURS AS NEEDED FOR SEVERE PAIN 30 tablet 0    ibuprofen 200 MG capsule Take 200 mg by mouth every 4 hours as needed for fever      LORazepam  "(ATIVAN) 0.5 MG tablet Take 1 tablet (0.5 mg) by mouth every 6 hours as needed for anxiety 20 tablet 0    meloxicam (MOBIC) 7.5 MG tablet Take 1 tablet (7.5 mg) by mouth daily 90 tablet 3    methocarbamol (ROBAXIN) 750 MG tablet TAKE ONE TO ONE AND ONE-HALF TABLETS BY MOUTH THREE TIMES DAILY AS NEEDED FOR MUSCLE SPASMS 45 tablet 1    MULTIVITAMINS OR 1 daily       omeprazole (PRILOSEC) 20 MG DR capsule Take 1 capsule (20 mg) by mouth daily 90 capsule 4    ondansetron (ZOFRAN ODT) 4 MG ODT tab Take 1 tablet (4 mg) by mouth every 8 hours as needed for nausea 20 tablet 0    oxyBUTYnin ER (DITROPAN XL) 15 MG 24 hr tablet Take 1 tablet (15 mg) by mouth daily 90 tablet 4    tirzepatide-Weight Management (ZEPBOUND) 10 MG/0.5ML prefilled pen Inject 0.5 mLs (10 mg) Subcutaneous every 7 days 2 mL 0    tirzepatide-Weight Management (ZEPBOUND) 12.5 MG/0.5ML prefilled pen Inject 0.5 mLs (12.5 mg) Subcutaneous every 7 days 2 mL 3    Vitamin D3 (CHOLECALCIFEROL) 125 MCG (5000 UT) tablet 6000 UNITs             6/24/2024    10:08 AM   Weight Loss Medication History Reviewed With Patient   Which weight loss medications are you currently taking on a regular basis? None   If you are not taking a weight loss medication that was prescribed to you, please indicate why: Other   Are you having any side effects from the weight loss medication that we have prescribed you? No         Objective    Ht 1.702 m (5' 7\")   Wt 91.2 kg (201 lb)   LMP 03/06/2011   BMI 31.48 kg/m      Vitals - Patient Reported  Pain Score: Mild Pain (3)  Pain Loc: Low Back      PHYSICAL EXAM:    GENERAL: alert and no distress  EYES: Eyes grossly normal to inspection.  No discharge or erythema, or obvious scleral/conjunctival abnormalities.  RESP: No audible wheeze, cough, or visible cyanosis.    SKIN: Visible skin clear. No significant rash, abnormal pigmentation or lesions.  NEURO: Cranial nerves grossly intact.  Mentation and speech appropriate for age.  PSYCH: " Appropriate affect, tone, and pace of words        Sincerely,    Grisel Baxter PA-C      20 minutes spent by me on the date of the encounter doing chart review, history and exam, documentation and further activities per the note    The longitudinal plan of care for the diagnosis(es)/condition(s) as documented were addressed during this visit. Due to the added complexity in care, I will continue to support Charlotte Montero in the subsequent management and with ongoing continuity of care.

## 2024-07-10 NOTE — TELEPHONE ENCOUNTER
DME (Durable Medical Equipment) Orders and Documentation  Orders Placed This Encounter   Procedures     Neck/Shoulder/Back/Abdomen Bracing Supplies Order Lumbosacral Brace     Neck/Shoulder/Back/Abdomen Bracing Supplies Order Lumbosacral Brace      The patient was assessed and it was determined the patient is in need of the following listed DME Supplies/Equipment. Please complete supporting documentation below to demonstrate medical necessity.

## 2024-08-08 ENCOUNTER — MYC MEDICAL ADVICE (OUTPATIENT)
Dept: FAMILY MEDICINE | Facility: CLINIC | Age: 64
End: 2024-08-08
Payer: COMMERCIAL

## 2024-08-08 NOTE — TELEPHONE ENCOUNTER
Patient Quality Outreach    Patient is due for the following:   Breast Cancer Screening - Mammogram    Next Steps:   Schedule a Mammogram    Type of outreach:    Sent [employer] message.      Questions for provider review:    None           Yumiko Gallagher

## 2024-09-03 DIAGNOSIS — F11.90 CHRONIC, CONTINUOUS USE OF OPIOIDS: ICD-10-CM

## 2024-09-03 DIAGNOSIS — M54.50 CHRONIC LOW BACK PAIN WITHOUT SCIATICA, UNSPECIFIED BACK PAIN LATERALITY: ICD-10-CM

## 2024-09-03 DIAGNOSIS — G89.29 CHRONIC LOW BACK PAIN WITHOUT SCIATICA, UNSPECIFIED BACK PAIN LATERALITY: ICD-10-CM

## 2024-09-04 RX ORDER — HYDROCODONE BITARTRATE AND ACETAMINOPHEN 10; 325 MG/1; MG/1
1 TABLET ORAL EVERY 6 HOURS PRN
Qty: 30 TABLET | Refills: 0 | Status: SHIPPED | OUTPATIENT
Start: 2024-09-04

## 2024-09-06 ENCOUNTER — PATIENT OUTREACH (OUTPATIENT)
Dept: CARE COORDINATION | Facility: CLINIC | Age: 64
End: 2024-09-06
Payer: COMMERCIAL

## 2024-09-20 ENCOUNTER — PATIENT OUTREACH (OUTPATIENT)
Dept: CARE COORDINATION | Facility: CLINIC | Age: 64
End: 2024-09-20
Payer: COMMERCIAL

## 2024-09-30 ENCOUNTER — OFFICE VISIT (OUTPATIENT)
Dept: ORTHOPEDICS | Facility: CLINIC | Age: 64
End: 2024-09-30
Payer: COMMERCIAL

## 2024-09-30 ENCOUNTER — ANCILLARY PROCEDURE (OUTPATIENT)
Dept: GENERAL RADIOLOGY | Facility: CLINIC | Age: 64
End: 2024-09-30
Attending: FAMILY MEDICINE
Payer: COMMERCIAL

## 2024-09-30 DIAGNOSIS — M25.562 LEFT KNEE PAIN: ICD-10-CM

## 2024-09-30 DIAGNOSIS — M25.562 CHRONIC PAIN OF BOTH KNEES: ICD-10-CM

## 2024-09-30 DIAGNOSIS — R20.2 NUMBNESS AND TINGLING IN RIGHT HAND: ICD-10-CM

## 2024-09-30 DIAGNOSIS — M25.561 CHRONIC PAIN OF BOTH KNEES: ICD-10-CM

## 2024-09-30 DIAGNOSIS — R20.2 NUMBNESS AND TINGLING IN RIGHT HAND: Primary | ICD-10-CM

## 2024-09-30 DIAGNOSIS — G89.29 CHRONIC PAIN OF BOTH KNEES: ICD-10-CM

## 2024-09-30 DIAGNOSIS — M17.0 PRIMARY OSTEOARTHRITIS OF BOTH KNEES: ICD-10-CM

## 2024-09-30 DIAGNOSIS — R20.0 NUMBNESS AND TINGLING IN RIGHT HAND: ICD-10-CM

## 2024-09-30 DIAGNOSIS — R20.0 NUMBNESS AND TINGLING IN RIGHT HAND: Primary | ICD-10-CM

## 2024-09-30 PROCEDURE — 20526 THER INJECTION CARP TUNNEL: CPT | Mod: RT | Performed by: FAMILY MEDICINE

## 2024-09-30 PROCEDURE — 99214 OFFICE O/P EST MOD 30 MIN: CPT | Mod: 25 | Performed by: FAMILY MEDICINE

## 2024-09-30 PROCEDURE — 73110 X-RAY EXAM OF WRIST: CPT | Mod: TC | Performed by: RADIOLOGY

## 2024-09-30 PROCEDURE — 73562 X-RAY EXAM OF KNEE 3: CPT | Mod: TC | Performed by: RADIOLOGY

## 2024-09-30 RX ORDER — BETAMETHASONE SODIUM PHOSPHATE AND BETAMETHASONE ACETATE 3; 3 MG/ML; MG/ML
6 INJECTION, SUSPENSION INTRA-ARTICULAR; INTRALESIONAL; INTRAMUSCULAR; SOFT TISSUE
Status: SHIPPED | OUTPATIENT
Start: 2024-09-30

## 2024-09-30 RX ORDER — ROPIVACAINE HYDROCHLORIDE 5 MG/ML
1 INJECTION, SOLUTION EPIDURAL; INFILTRATION; PERINEURAL
Status: SHIPPED | OUTPATIENT
Start: 2024-09-30

## 2024-09-30 RX ADMIN — BETAMETHASONE SODIUM PHOSPHATE AND BETAMETHASONE ACETATE 6 MG: 3; 3 INJECTION, SUSPENSION INTRA-ARTICULAR; INTRALESIONAL; INTRAMUSCULAR; SOFT TISSUE at 11:17

## 2024-09-30 RX ADMIN — ROPIVACAINE HYDROCHLORIDE 1 ML: 5 INJECTION, SOLUTION EPIDURAL; INFILTRATION; PERINEURAL at 11:17

## 2024-09-30 NOTE — PATIENT INSTRUCTIONS
# Right Hand Numbness, Bilateral Left > Right Knee Pain: Charlotte Arredondo  was seen today for right hand numbness and bilateral knee arthritis. Symptoms had been going on for 3-6 mon numbness over right thumb/index, bilateral posterior knee pain. On examination there are positive findings of positive carpal tunnel testing, no significant knee pain. Imaging findings showed moderate knee arthritis, negative wrist x-rays. Likely cause of patient's condition due to right carpal tunnel syndrome, flare of knee arthritis. Other possible conditions contributing to symptoms include possible cervical radiculopathy.  Counseled patient on nature of condition and treatment options.  Given this plan as below, follow-up 1 mon as needed     Image Findings: negative wrist x-rays, bilateral knee arthritis  Treatment: Activities as tolerated, home exercises given today, carpal tunnel brace at night, knee pads when kneeling, wrap around knee brace  Medications/Injections: Limited tylenol/ibuprofen for pain for 1-2 weeks, Topical Voltaren gel, right carpal tunnel steroid injection   Follow-up: In one month if symptoms do not improve, sooner if worsening  Can consider repeat evaluation of knees.     Please call [phone]   Ask for my team if you have any questions or concerns    If you have not yet received the influenza vaccine but would like to get one, please call  [phone] or you can schedule via [employer]    It was great seeing you again today!    Harley Peace MD, CAQSM         [employer] Pro Knee Brace Support - Knee Braces for Knee Pain. Relieves ACL, LCL, MCL, Meniscus Tear, Arthritis, Tendonitis Pain.

## 2024-09-30 NOTE — LETTER
9/30/2024      Charlotte Arredondo  [address] [place] [address]      Dear Colleague,    Thank you for referring your patient, Charlotte Arredondo, to the [facility]. Please see a copy of my visit note below.    ASSESSMENT & PLAN    Charlotte Montero was seen today for numbness and pain.    Diagnoses and all orders for this visit:    Left knee pain  -     XR Knee Bilateral 3 Views; Future    Numbness and tingling in right hand  -     XR Wrist Right G/E 3 Views; Future  -     Hand / Upper Extremity Injection/Arthrocentesis: R carpal tunnel    Other orders  -     Cancel: Small Joint Injection/Arthrocentesis  -     Cancel: Medium Joint Injection/Arthrocentesis      This issue is acute on chronic and Worsening.    # Right Hand Numbness, Bilateral Left > Right Knee Pain: Charlotte Arredondo  was seen today for right hand numbness and bilateral knee arthritis. Symptoms had been going on for 3-6 mon numbness over right thumb/index, bilateral posterior knee pain. On examination there are positive findings of positive carpal tunnel testing, no significant knee pain. Imaging findings showed moderate knee arthritis, negative wrist x-rays. Likely cause of patient's condition due to right carpal tunnel syndrome, flare of knee arthritis. Other possible conditions contributing to symptoms include possible cervical radiculopathy.  Counseled patient on nature of condition and treatment options.  Given this plan as below, follow-up 1 mon as needed     Image Findings: negative wrist x-rays, bilateral knee arthritis  Treatment: Activities as tolerated, home exercises given today, carpal tunnel brace at night, knee pads when kneeling, wrap around knee brace  Medications/Injections: Limited tylenol/ibuprofen for pain for 1-2 weeks, Topical Voltaren gel, right carpal tunnel steroid injection   Follow-up: In one month if symptoms do not improve, sooner if worsening  Can consider repeat evaluation of  knees.     Shahram Adam MD  PGY-4 PM&R  [facility]    Patient seen and discussed with Dr. Peace.    Harley Peace MD  [facility] [place]    I was present with the resident during the history and exam.  I discussed the case with the resident and agree with the findings as documented in the assessment and plan.     -----  Chief Complaint   Patient presents with     Right Hand - Numbness     Left Knee - Pain       SUBJECTIVE  Charlotte Arredondo is a/an 63 year old female who is seen as a self referral for evaluation of RIGHT hand, LEFT Knee.     The patient is seen by themselves.    The patient is Right handed    RIGHT WRIST/HAND; primarily thumb  Onset: 6 month(s) ago. Reports insidious onset without acute precipitating event.  Location of Pain: right hand, thumb and index finger numbness  Worsened by: night time, morning, using the hand, too much use, cleaning  Better with: nothing, shaking it  Treatments tried: casting/splinting/bracing, stretching  Associated symptoms: numbness, tingling, weakness of right hand, and feels itchy    2. LEFT KNEE: posterior, wraps around to the medial/lateral joint line   Onset: 2 month(s) ago. Reports insidious onset without acute precipitating event.  Location of Pain: left knee  Worsened by: prolonged standing, sleeping, getting into bed, kneeling  Better with: icing, voltaren, knee sleeve  Treatments tried: ice, casting/splinting/bracing, and topicals  Associated symptoms: swelling, weakness of left knee, and feeling of instability    Orthopedic/Surgical history: YES - Date: had previously been seen by me for her RIGHT wrist/hand 2022. Has previously been seen for her left knee in 2017  Social History/Occupation: computer work    No family history pertinent to patient's problem today.      REVIEW OF SYSTEMS:-  Review of Systems  Constitutional, HEENT, cardiovascular, pulmonary, gi and gu systems are negative, except as otherwise  noted.    OBJECTIVE:  LMP 03/06/2011    General: healthy, alert and in no distress  HEENT: no scleral icterus or conjunctival erythema  Skin: no suspicious lesions or rash. No jaundice.  CV: distal perfusion intact    Resp: normal respiratory effort without conversational dyspnea   Psych: normal mood and affect  Gait: normal steady gait with appropriate coordination and balance    Neuro:  Decreased right hand sensation notably in the thumb, intact bilateral lower extremities    BILATERAL KNEE  Inspection:    normal alignment  Palpation:    No tenderness to palpation in the bony and ligamentous landmarks     No effusion is present bilateral     Patellofemoral crepitus is Present bilateral   Range of Motion:     00 extension to 1350 flexion bilateral   Strength:    Quadriceps 5/5 and hamstrings 5/5    Extensor mechanism intact  Special Tests:    Negative: Zbigniew's     RIGHT WRIST  Inspection:    No swelling or obvious deformity or asymmetry  Palpation:    bony and ligamentous line marks are nontender including the first dorsal extensor compartment and 1 CMC joint   Range of Motion:    Full (active and passive) flexion, extension, pronation/supination, and ulnar/radial deviation.  Strength:    No deficits in flexion, extension, ulnar/radial deviation, or  strength.. Normal pinch strength.  Special Tests:    Positive: Phalen's    Negative: Tinel's (carpal tunnel), CMC grind.     RADIOLOGY:  Recent Results (from the past 24 hour(s))   XR Knee Bilateral 3 Views    Narrative    KNEE BILATERAL THREE VIEWS September 30, 2024 8:51 AM     HISTORY: Left knee pain.    COMPARISON: X-ray from 5/4/2017.      Impression    IMPRESSION:  Right: Mild medial and lateral compartment osteoarthrosis, unchanged.  Moderate patellofemoral osteoarthrosis. No fracture, effusion or  calcified intra-articular body.    Left: Mild medial and lateral compartment osteoarthrosis, unchanged.  Mild patellofemoral osteoarthrosis. No fracture,  effusion or calcified  intra-articular body.    TIRSO JEAN MD         SYSTEM ID:  [id number]   XR Wrist Right G/E 3 Views    Narrative    WRIST RIGHT THREE OR MORE VIEWS September 30, 2024 8:51 AM     HISTORY: Numbness and tingling in right hand.    COMPARISON: None.      Impression    IMPRESSION: Normal.    TIRSO JEAN MD         SYSTEM ID:  [id number]       I independently ordered, visualized and reviewed these images with the patient  Left right wrist x-rays, bilateral knee arthritis      Review of external notes as documented elsewhere in note  Review of the result(s) of each unique test - right wrist and bilateral knee x-rays           Harley Peace MD, [facility]  Hand / Upper Extremity Injection/Arthrocentesis: R carpal tunnel    Date/Time: 9/30/2024 11:17 AM    Performed by: Harley Peace MD  Authorized by: Harley Peace MD    Condition: carpal tunnel      Site:  R carpal tunnel  Medications:  1 mL ROPivacaine 5 MG/ML; 6 mg betamethasone acet & sod phos 6 (3-3) MG/ML  Outcome:  Tolerated well, no immediate complications  Procedure discussed: discussed risks, benefits, and alternatives    Timeout: timeout called immediately prior to procedure    Prep: patient was prepped and draped in usual sterile fashion     Patient reported increased numbness/tingling after right carpal tunnel steroid injection.  Ultrasound guided images were permanently stored.   Aftercare instructions given to patient.  Plan to follow-up as discussed above.     Harley Peace MD [facility]            Again, thank you for allowing me to participate in the care of your patient.        Sincerely,        Harley Peace MD

## 2024-09-30 NOTE — PROGRESS NOTES
ASSESSMENT & PLAN    Charlotte Montero was seen today for numbness and pain.    Diagnoses and all orders for this visit:    Left knee pain  -     XR Knee Bilateral 3 Views; Future    Numbness and tingling in right hand  -     XR Wrist Right G/E 3 Views; Future  -     Hand / Upper Extremity Injection/Arthrocentesis: R carpal tunnel    Other orders  -     Cancel: Small Joint Injection/Arthrocentesis  -     Cancel: Medium Joint Injection/Arthrocentesis      This issue is acute on chronic and Worsening.    # Right Hand Numbness, Bilateral Left > Right Knee Pain: Charlotte Arredondo  was seen today for right hand numbness and bilateral knee arthritis. Symptoms had been going on for 3-6 mon numbness over right thumb/index, bilateral posterior knee pain. On examination there are positive findings of positive carpal tunnel testing, no significant knee pain. Imaging findings showed moderate knee arthritis, negative wrist x-rays. Likely cause of patient's condition due to right carpal tunnel syndrome, flare of knee arthritis. Other possible conditions contributing to symptoms include possible cervical radiculopathy.  Counseled patient on nature of condition and treatment options.  Given this plan as below, follow-up 1 mon as needed     Image Findings: negative wrist x-rays, bilateral knee arthritis  Treatment: Activities as tolerated, home exercises given today, carpal tunnel brace at night, knee pads when kneeling, wrap around knee brace  Medications/Injections: Limited tylenol/ibuprofen for pain for 1-2 weeks, Topical Voltaren gel, right carpal tunnel steroid injection   Follow-up: In one month if symptoms do not improve, sooner if worsening  Can consider repeat evaluation of knees.     Shahram Adam MD  PGY-4 PM&R  [facility]    Patient seen and discussed with Dr. Peace.    Harley Peace MD  [facility] SPORTS MEDICINE [facility] CECILLE AVENDAÑO was present with the resident during the history and exam.  I  discussed the case with the resident and agree with the findings as documented in the assessment and plan.     -----  Chief Complaint   Patient presents with    Right Hand - Numbness    Left Knee - Pain       SUBJECTIVE  Charlotte Arredondo is a/an 63 year old female who is seen as a self referral for evaluation of RIGHT hand, LEFT Knee.     The patient is seen by themselves.    The patient is Right handed    RIGHT WRIST/HAND; primarily thumb  Onset: 6 month(s) ago. Reports insidious onset without acute precipitating event.  Location of Pain: right hand, thumb and index finger numbness  Worsened by: night time, morning, using the hand, too much use, cleaning  Better with: nothing, shaking it  Treatments tried: casting/splinting/bracing, stretching  Associated symptoms: numbness, tingling, weakness of right hand, and feels itchy    2. LEFT KNEE: posterior, wraps around to the medial/lateral joint line   Onset: 2 month(s) ago. Reports insidious onset without acute precipitating event.  Location of Pain: left knee  Worsened by: prolonged standing, sleeping, getting into bed, kneeling  Better with: icing, voltaren, knee sleeve  Treatments tried: ice, casting/splinting/bracing, and topicals  Associated symptoms: swelling, weakness of left knee, and feeling of instability    Orthopedic/Surgical history: YES - Date: had previously been seen by me for her RIGHT wrist/hand 2022. Has previously been seen for her left knee in 2017  Social History/Occupation: computer work    No family history pertinent to patient's problem today.      REVIEW OF SYSTEMS:-  Review of Systems  Constitutional, HEENT, cardiovascular, pulmonary, gi and gu systems are negative, except as otherwise noted.    OBJECTIVE:  LMP 03/06/2011    General: healthy, alert and in no distress  HEENT: no scleral icterus or conjunctival erythema  Skin: no suspicious lesions or rash. No jaundice.  CV: distal perfusion intact    Resp: normal respiratory effort without  conversational dyspnea   Psych: normal mood and affect  Gait: normal steady gait with appropriate coordination and balance    Neuro:  Decreased right hand sensation notably in the thumb, intact bilateral lower extremities    BILATERAL KNEE  Inspection:    normal alignment  Palpation:    No tenderness to palpation in the bony and ligamentous landmarks     No effusion is present bilateral     Patellofemoral crepitus is Present bilateral   Range of Motion:     00 extension to 1350 flexion bilateral   Strength:    Quadriceps 5/5 and hamstrings 5/5    Extensor mechanism intact  Special Tests:    Negative: Zbigniew's     RIGHT WRIST  Inspection:    No swelling or obvious deformity or asymmetry  Palpation:    bony and ligamentous line marks are nontender including the first dorsal extensor compartment and 1 CMC joint   Range of Motion:    Full (active and passive) flexion, extension, pronation/supination, and ulnar/radial deviation.  Strength:    No deficits in flexion, extension, ulnar/radial deviation, or  strength.. Normal pinch strength.  Special Tests:    Positive: Phalen's    Negative: Tinel's (carpal tunnel), CMC grind.     RADIOLOGY:  Recent Results (from the past 24 hour(s))   XR Knee Bilateral 3 Views    Narrative    KNEE BILATERAL THREE VIEWS September 30, 2024 8:51 AM     HISTORY: Left knee pain.    COMPARISON: X-ray from 5/4/2017.      Impression    IMPRESSION:  Right: Mild medial and lateral compartment osteoarthrosis, unchanged.  Moderate patellofemoral osteoarthrosis. No fracture, effusion or  calcified intra-articular body.    Left: Mild medial and lateral compartment osteoarthrosis, unchanged.  Mild patellofemoral osteoarthrosis. No fracture, effusion or calcified  intra-articular body.    TIRSO JEAN MD         SYSTEM ID:  [id number]   XR Wrist Right G/E 3 Views    Narrative    WRIST RIGHT THREE OR MORE VIEWS September 30, 2024 8:51 AM     HISTORY: Numbness and tingling in right  hand.    COMPARISON: None.      Impression    IMPRESSION: Normal.    TIRSO JEAN MD         SYSTEM ID:  [id number]       I independently ordered, visualized and reviewed these images with the patient  Left right wrist x-rays, bilateral knee arthritis      Review of external notes as documented elsewhere in note  Review of the result(s) of each unique test - right wrist and bilateral knee x-rays           Harley Peace MD, [facility]  Hand / Upper Extremity Injection/Arthrocentesis: R carpal tunnel    Date/Time: 9/30/2024 11:17 AM    Performed by: Harley Peace MD  Authorized by: Harley Peace MD    Condition: carpal tunnel      Site:  R carpal tunnel  Medications:  1 mL ROPivacaine 5 MG/ML; 6 mg betamethasone acet & sod phos 6 (3-3) MG/ML  Outcome:  Tolerated well, no immediate complications  Procedure discussed: discussed risks, benefits, and alternatives    Timeout: timeout called immediately prior to procedure    Prep: patient was prepped and draped in usual sterile fashion     Patient reported increased numbness/tingling after right carpal tunnel steroid injection.  Ultrasound guided images were permanently stored.   Aftercare instructions given to patient.  Plan to follow-up as discussed above.     Harley Peace MD [facility] and [facility]opedic [facility]

## 2024-10-14 DIAGNOSIS — E66.812 CLASS 2 SEVERE OBESITY WITH SERIOUS COMORBIDITY AND BODY MASS INDEX (BMI) OF 35.0 TO 35.9 IN ADULT, UNSPECIFIED OBESITY TYPE (H): Primary | ICD-10-CM

## 2024-10-14 DIAGNOSIS — E66.01 CLASS 2 SEVERE OBESITY WITH SERIOUS COMORBIDITY AND BODY MASS INDEX (BMI) OF 35.0 TO 35.9 IN ADULT, UNSPECIFIED OBESITY TYPE (H): Primary | ICD-10-CM

## 2024-11-01 ENCOUNTER — VIRTUAL VISIT (OUTPATIENT)
Dept: ENDOCRINOLOGY | Facility: CLINIC | Age: 64
End: 2024-11-01
Payer: COMMERCIAL

## 2024-11-01 VITALS — WEIGHT: 193 LBS | HEIGHT: 67 IN | BODY MASS INDEX: 30.29 KG/M2

## 2024-11-01 DIAGNOSIS — E66.812 CLASS 2 SEVERE OBESITY WITH SERIOUS COMORBIDITY AND BODY MASS INDEX (BMI) OF 35.0 TO 35.9 IN ADULT, UNSPECIFIED OBESITY TYPE (H): ICD-10-CM

## 2024-11-01 DIAGNOSIS — E66.01 CLASS 2 SEVERE OBESITY WITH SERIOUS COMORBIDITY AND BODY MASS INDEX (BMI) OF 35.0 TO 35.9 IN ADULT, UNSPECIFIED OBESITY TYPE (H): ICD-10-CM

## 2024-11-01 PROCEDURE — G2211 COMPLEX E/M VISIT ADD ON: HCPCS | Mod: 95

## 2024-11-01 PROCEDURE — 99213 OFFICE O/P EST LOW 20 MIN: CPT | Mod: 95

## 2024-11-01 ASSESSMENT — PAIN SCALES - GENERAL: PAINLEVEL_OUTOF10: NO PAIN (0)

## 2024-11-01 NOTE — PATIENT INSTRUCTIONS
"Cole Montero,   Thank you for allowing us the privilege of caring for you. We hope we provided you with the excellent service you deserve.   Please let us know if there is anything else we can do for you so that we can be sure you are completely satisfied with your care experience.    To ensure the quality of our services you may be receiving a patient satisfaction survey from an independent patient satisfaction monitoring company.    The greatest compliment you can give is a \"Likely to Recommend\"    Your visit was with Grisel Baxter PA-C today.    Instructions per today's visit:     Continue Zepbound 15mg once weekly. Refills sent. Reach out if you have any repeat episodes of nausea and vomiting.   Protein goals - 60g daily   Fluid goals - 64oz daily   Activity goal - 2 xweek weight training   Grisel Mathias in 4 months     ___________________________________________________________________________  Important contact and scheduling information:  Please call our contact center at [phone] to schedule your next appointments.  For any nursing questions or concerns call Mary Salazar LPN at [phone] or Miguelina Gan RN at [phone]  Please call during clinic hours Monday through Friday 8:00a - 4:00p if you have questions or you can contact us via [employer] at anytime and we will reply during clinic hours.    Lab results will be communicated through My Chart or letter (if My Chart not used). Please call the clinic if you have not received communication after 1 week or if you have any questions.?  Clinic Fax: [phone]  __________________________________________________________________________    If labs were ordered today:    Please make an appointment to have them drawn at your convenience.     To schedule the Lab Appointment using [employer]:  Select \"Schedule an Appointment\"  Select \"Lab Only\"  For \"A couple of questions\", select \"Other\"  For \"Which locations work for you?, select the location and set up the " appointment    To schedule by phone call [phone] to schedule a lab only appointment at any [facility] lab.  ___________________________________________________________________________  Work with A Health !  Virtual Sessions are Available through [facility] Weight Management Clinics    To learn more, call to schedule a free, Health  Q&A appointment: [phone]     What is Health Coaching?  Do you know what you are supposed to do, but you just aren't doing it?  Then, HEALTH COACHING may help you!   Get unstuck and move forward with the support of a professionally trained NBC-HWC (National Board-Certified Health and ) who uses evidence-based approaches to help you move forward with healthy lifestyle changes in the areas of weight loss, stress management and overall well-being.    Health Coaches help you identify goals that will work best for you. Health Coaches provide support and encouragement with overcoming barriers and help you to find inspiration and motivation to lead a healthy lifestyle.    Option one:  Health Coaching 3-Pack; Three, 30-minute Health Coaching Visits, for $99  Visits are done virtually (phone or video)  This is a self pay service; we do not accept insurance for terri coaching.    Option two:   The 24 week Plan; 11 Health Coaching Visits, and a 7 months subscription to [employer]-- on-demand fitness, nutrition and mindfulness classes, for $499 (employee discounts may be available). Participants will also meet regularly with a weight management Medical Provider and a Registered/Licensed Dietician.  This is a self-pay service; we do not accept insurance for health coaching.    To Schedule a free Health  Q&A appointment to learn more,  call [phone].  ____________________________________________________________________  [facility]  Healthy Lifestyle Group    Healthy Lifestyle Group  This is a 60 minute  "virtual coaching group for those who want to lead a healthier lifestyle. Come together to set goals and overcome barriers in a supportive group environment. We will address the four pillars of health--nutrition, exercise, sleep and emotional well-being.  This group is highly recommended for those who are participating in the 24 week Healthy Lifestyle Plan and our Health Coaching sessions.    WHEN: This group meets the first Friday of the month, 12:30 PM - 1:30 PM online, via a zoom meeting.      FACILITATOR: Led by National Board Certified Health and , Cecilia De La Rosa, [facility].    TO REGISTER: Please call the Call Center at [phone] to register. You will get an appointment to attend in [employer]. Fifteen minutes prior to the meeting, complete the e-check in and you will get the link to join the meeting.  There is no charge to attend this group and space is limited.      2023 and 2024 Meeting Topics and Dates:    November 3: Introduction to Mindfulness (Learn simple and effective mindfulness practices and how it can benefit you)    December 8: Let's Talk (guided discussion on our wins and challenges)    January 5: New Years Vision: Manifest your Best 2024! (Guided imagery,  journaling and discussion)    February 2: Let's Talk    March 1: 10 Percent Happier by Shai Ballard (Book Bites; a guided discussion on the nuggets of wisdom from favorite wellness books; no need to read the book but highly encouraged)    April 5: Let's Talk    May 3: \"Essentialism; The Disciplined Pursuit of Less by Anders Crawford (book bites discussion)    June 7: Let's Talk    July 5: NO MEETING, off for the 4th of July Holiday    August 2: The Blue Zones, Secrets for Living a Longer Life by Shai Marin (book bites discussion)      If you would like bariatric surgery specific support group info please let your care team know.         Thank you,   [facility] Comprehensive Weight Management Team                          "

## 2024-11-01 NOTE — NURSING NOTE
Current patient location: home    Is the patient currently in the state of [place]? YES    Visit mode:VIDEO    If the visit is dropped, the patient can be reconnected by: VIDEO VISIT: Text to cell phone:   Telephone Information:   Mobile [phone]       Will anyone else be joining the visit? NO  (If patient encounters technical issues they should call [phone] :[phone])    Are changes needed to the allergy or medication list? Pt stated no changes to allergies and Pt stated no med changes    Are refills needed on medications prescribed by this physician? Discuss with provider    Rooming Documentation:  Questionnaire(s) completed      Reason for visit: RECHECK    Wt other than 24 hrs:    Pain more than one location:  no  Tavia FRITZ

## 2024-11-01 NOTE — LETTER
2024       RE: Charlotte Arredondo  [address] [place] [address]     Dear Colleague,    Thank you for referring your patient, Charlotte Arredondo, to the [facility]NAGEMENT [facility] [place] [facility]. Please see a copy of my visit note below.    Virtual Visit Details    Type of service:  Video Visit     Originating Location (pt. Location): Home    Distant Location (provider location):  Off-site  Platform used for Video Visit: [facility] Medical Weight Management Note     Chalrotte Arredondo  MRN:  [id number]  :  1960  JUSTICE:  2024    Dear Marybel Aguiar MD,    I had the pleasure of seeing your patient Charlotte Arredondo. She is a 63 year old female who I am continuing to see for treatment of obesity related to:        2022     4:38 PM   --   I have the following health issues associated with obesity Pre-Diabetes    High Cholesterol    Sleep Apnea    Stress Incontinence   I have the following symptoms associated with obesity Knee Pain    Depression    Back Pain    Fatigue       Assessment & Plan  Problem List Items Addressed This Visit    None  Visit Diagnoses       Class 2 severe obesity with serious comorbidity and body mass index (BMI) of 35.0 to 35.9 in adult, unspecified obesity type (H)        Relevant Medications    tirzepatide-Weight Management (ZEPBOUND) 15 MG/0.5ML prefilled pen             Continue Zepbound 15mg once weekly. Refills sent. Reach out if you have any repeat episodes of nausea and vomiting.   Protein goals - 60g daily   Fluid goals - 64oz daily   Activity goal - 2 xweek weight training   Grisel Mathias in 4 months       INTERVAL HISTORY:  sage MWM - 2022 - Started Saxenda  2023 - wanted to continue Saxenda 3.0mg once daily.   Patient reached out via [employer] 2023 that Saxenda was no longer helpful with hunger, and would like to transition to Wegovy   Transitioned to Wegovy  1.7mg once weekly   Reached out via [employer] 2/28/2023 and increase to Wegovy 2.4mg    Transitioned to Zepbound 3/27/2024      Weight loss has been slower. Continues to have a goal weight around 180lbs.     Anti-obesity medication history    Current:   Zepbound 15mg - took first injection this week. However, 2 weeks ago (on 12.5mg at that time) had 24hr of vomiting - but thought maybe had a GI bug. Has not had any side effects with dose increase.       Recent diet changes: Eating 2 meals a day. Decrease in night time eating. Has been focusing on protein intake. Has now been having a protein dense breakfast - eggs, cottage cheese. Minimal snacks. Hunger is well controlled. Drinking 60oz, but has been lower lately having harder time since being sick.     Recent exercise/activity changes: has been gardening a lot over the summer. Has been having knee pain - arthritis. Has been recommended weight bearing exercises to help. Has been walking more.     Recent stressors: going to see mother for a month. Son and his family is living at her home while they build a house. Has a lot going on right now.     Recent sleep changes: No concerns        CURRENT WEIGHT:   193 lbs 0 oz    Initial Weight (lbs): 242 lbs     Cumulative weight loss (lbs): 49  Weight Loss Percentage: 20.25%    Wt Readings from Last 5 Encounters:   11/01/24 87.5 kg (193 lb)   06/27/24 91.2 kg (201 lb)   05/03/24 92.1 kg (203 lb)   03/27/24 89.8 kg (198 lb)   11/21/23 92.1 kg (203 lb)             10/30/2024     8:47 AM   Changes and Difficulties   I have made the following changes to my diet since my last visit: Less late night eating.   I have made the following changes to my activity/exercise since my last visit: Walking more         MEDICATIONS:   Current Outpatient Medications   Medication Sig Dispense Refill     tirzepatide-Weight Management (ZEPBOUND) 15 MG/0.5ML prefilled pen Inject 0.5 mLs (15 mg) subcutaneously every 7 days. 2 mL 3     acetaminophen  "(TYLENOL) 650 MG CR tablet Take 1,300 mg by mouth every 8 hours as needed        acyclovir (ZOVIRAX) 5 % external cream Apply  topically. Apply to affected area thinly five times daily at the first sign of symptoms.       CALCIUM + D OR 1 TABLET DAILY       FLUoxetine (PROZAC) 20 MG capsule Take 1 capsule (20 mg) by mouth daily 90 capsule 4     HYDROcodone-acetaminophen (NORCO)  MG per tablet TAKE ONE TABLET BY MOUTH EVERY 6 HOURS AS NEEDED FOR SEVERE PAIN 30 tablet 0     ibuprofen 200 MG capsule Take 200 mg by mouth every 4 hours as needed for fever       LORazepam (ATIVAN) 0.5 MG tablet Take 1 tablet (0.5 mg) by mouth every 6 hours as needed for anxiety 20 tablet 0     meloxicam (MOBIC) 7.5 MG tablet Take 1 tablet (7.5 mg) by mouth daily 90 tablet 3     methocarbamol (ROBAXIN) 750 MG tablet TAKE ONE TO ONE AND ONE-HALF TABLETS BY MOUTH THREE TIMES DAILY AS NEEDED FOR MUSCLE SPASMS 45 tablet 1     MULTIVITAMINS OR 1 daily        omeprazole (PRILOSEC) 20 MG DR capsule Take 1 capsule (20 mg) by mouth daily 90 capsule 4     ondansetron (ZOFRAN ODT) 4 MG ODT tab Take 1 tablet (4 mg) by mouth every 8 hours as needed for nausea 20 tablet 0     oxyBUTYnin ER (DITROPAN XL) 15 MG 24 hr tablet Take 1 tablet (15 mg) by mouth daily 90 tablet 4     tirzepatide-Weight Management (ZEPBOUND) 12.5 MG/0.5ML prefilled pen Inject 0.5 mLs (12.5 mg) Subcutaneous every 7 days 2 mL 3     Vitamin D3 (CHOLECALCIFEROL) 125 MCG (5000 UT) tablet 6000 UNITs             10/30/2024     8:47 AM   Weight Loss Medication History Reviewed With Patient   Are you having any side effects from the weight loss medication that we have prescribed you? No         Objective   Ht 1.702 m (5' 7\")   Wt 87.5 kg (193 lb)   LMP 03/06/2011   BMI 30.23 kg/m      Vitals - Patient Reported  Pain Score: No Pain (0)      PHYSICAL EXAM:  GENERAL: alert and no distress  EYES: Eyes grossly normal to inspection.  No discharge or erythema, or obvious " scleral/conjunctival abnormalities.  RESP: No audible wheeze, cough, or visible cyanosis.    SKIN: Visible skin clear. No significant rash, abnormal pigmentation or lesions.  NEURO: Cranial nerves grossly intact.  Mentation and speech appropriate for age.  PSYCH: Appropriate affect, tone, and pace of words        Sincerely,    Grisel Baxter PA-C      29 minutes spent by me on the date of the encounter doing chart review, history and exam, documentation and further activities per the note    The longitudinal plan of care for the diagnosis(es)/condition(s) as documented were addressed during this visit. Due to the added complexity in care, I will continue to support Shauna in the subsequent management and with ongoing continuity of care.      Again, thank you for allowing me to participate in the care of your patient.      Sincerely,    Grisel Baxter PA-C

## 2024-11-01 NOTE — PROGRESS NOTES
Virtual Visit Details    Type of service:  Video Visit     Originating Location (pt. Location): Home    Distant Location (provider location):  Off-site  Platform used for Video Visit: [facility] Medical Weight Management Note     Charlotte Arredondo  MRN:  [id number]  :  1960  JUSTICE:  2024    Dear Marybel Aguiar MD,    I had the pleasure of seeing your patient Charlotte Arredondo. She is a 63 year old female who I am continuing to see for treatment of obesity related to:        2022     4:38 PM   --   I have the following health issues associated with obesity Pre-Diabetes    High Cholesterol    Sleep Apnea    Stress Incontinence   I have the following symptoms associated with obesity Knee Pain    Depression    Back Pain    Fatigue       Assessment & Plan   Problem List Items Addressed This Visit    None  Visit Diagnoses       Class 2 severe obesity with serious comorbidity and body mass index (BMI) of 35.0 to 35.9 in adult, unspecified obesity type (H)        Relevant Medications    tirzepatide-Weight Management (ZEPBOUND) 15 MG/0.5ML prefilled pen             Continue Zepbound 15mg once weekly. Refills sent. Reach out if you have any repeat episodes of nausea and vomiting.   Protein goals - 60g daily   Fluid goals - 64oz daily   Activity goal - 2 xweek weight training   Grisel Mathias in 4 months       INTERVAL HISTORY:  sage MWM - 2022 - Started Saxenda  2023 - wanted to continue Saxenda 3.0mg once daily.   Patient reached out via [employer] 2023 that Saxenda was no longer helpful with hunger, and would like to transition to Wegovy   Transitioned to Wegovy 1.7mg once weekly   Reached out via [employer] 2023 and increase to Wegovy 2.4mg    Transitioned to Zepbound 3/27/2024      Weight loss has been slower. Continues to have a goal weight around 180lbs.     Anti-obesity medication history    Current:   Zepbound 15mg - took first injection this week. However, 2 weeks ago (on  12.5mg at that time) had 24hr of vomiting - but thought maybe had a GI bug. Has not had any side effects with dose increase.       Recent diet changes: Eating 2 meals a day. Decrease in night time eating. Has been focusing on protein intake. Has now been having a protein dense breakfast - eggs, cottage cheese. Minimal snacks. Hunger is well controlled. Drinking 60oz, but has been lower lately having harder time since being sick.     Recent exercise/activity changes: has been gardening a lot over the summer. Has been having knee pain - arthritis. Has been recommended weight bearing exercises to help. Has been walking more.     Recent stressors: going to see mother for a month. Son and his family is living at her home while they build a house. Has a lot going on right now.     Recent sleep changes: No concerns        CURRENT WEIGHT:   193 lbs 0 oz    Initial Weight (lbs): 242 lbs     Cumulative weight loss (lbs): 49  Weight Loss Percentage: 20.25%    Wt Readings from Last 5 Encounters:   11/01/24 87.5 kg (193 lb)   06/27/24 91.2 kg (201 lb)   05/03/24 92.1 kg (203 lb)   03/27/24 89.8 kg (198 lb)   11/21/23 92.1 kg (203 lb)             10/30/2024     8:47 AM   Changes and Difficulties   I have made the following changes to my diet since my last visit: Less late night eating.   I have made the following changes to my activity/exercise since my last visit: Walking more         MEDICATIONS:   Current Outpatient Medications   Medication Sig Dispense Refill    tirzepatide-Weight Management (ZEPBOUND) 15 MG/0.5ML prefilled pen Inject 0.5 mLs (15 mg) subcutaneously every 7 days. 2 mL 3    acetaminophen (TYLENOL) 650 MG CR tablet Take 1,300 mg by mouth every 8 hours as needed       acyclovir (ZOVIRAX) 5 % external cream Apply  topically. Apply to affected area thinly five times daily at the first sign of symptoms.      CALCIUM + D OR 1 TABLET DAILY      FLUoxetine (PROZAC) 20 MG capsule Take 1 capsule (20 mg) by mouth daily 90  "capsule 4    HYDROcodone-acetaminophen (NORCO)  MG per tablet TAKE ONE TABLET BY MOUTH EVERY 6 HOURS AS NEEDED FOR SEVERE PAIN 30 tablet 0    ibuprofen 200 MG capsule Take 200 mg by mouth every 4 hours as needed for fever      LORazepam (ATIVAN) 0.5 MG tablet Take 1 tablet (0.5 mg) by mouth every 6 hours as needed for anxiety 20 tablet 0    meloxicam (MOBIC) 7.5 MG tablet Take 1 tablet (7.5 mg) by mouth daily 90 tablet 3    methocarbamol (ROBAXIN) 750 MG tablet TAKE ONE TO ONE AND ONE-HALF TABLETS BY MOUTH THREE TIMES DAILY AS NEEDED FOR MUSCLE SPASMS 45 tablet 1    MULTIVITAMINS OR 1 daily       omeprazole (PRILOSEC) 20 MG DR capsule Take 1 capsule (20 mg) by mouth daily 90 capsule 4    ondansetron (ZOFRAN ODT) 4 MG ODT tab Take 1 tablet (4 mg) by mouth every 8 hours as needed for nausea 20 tablet 0    oxyBUTYnin ER (DITROPAN XL) 15 MG 24 hr tablet Take 1 tablet (15 mg) by mouth daily 90 tablet 4    tirzepatide-Weight Management (ZEPBOUND) 12.5 MG/0.5ML prefilled pen Inject 0.5 mLs (12.5 mg) Subcutaneous every 7 days 2 mL 3    Vitamin D3 (CHOLECALCIFEROL) 125 MCG (5000 UT) tablet 6000 UNITs             10/30/2024     8:47 AM   Weight Loss Medication History Reviewed With Patient   Are you having any side effects from the weight loss medication that we have prescribed you? No         Objective    Ht 1.702 m (5' 7\")   Wt 87.5 kg (193 lb)   LMP 03/06/2011   BMI 30.23 kg/m      Vitals - Patient Reported  Pain Score: No Pain (0)      PHYSICAL EXAM:  GENERAL: alert and no distress  EYES: Eyes grossly normal to inspection.  No discharge or erythema, or obvious scleral/conjunctival abnormalities.  RESP: No audible wheeze, cough, or visible cyanosis.    SKIN: Visible skin clear. No significant rash, abnormal pigmentation or lesions.  NEURO: Cranial nerves grossly intact.  Mentation and speech appropriate for age.  PSYCH: Appropriate affect, tone, and pace of words        Sincerely,    Grisel Baxter, PAKaitlinC      29 " minutes spent by me on the date of the encounter doing chart review, history and exam, documentation and further activities per the note    The longitudinal plan of care for the diagnosis(es)/condition(s) as documented were addressed during this visit. Due to the added complexity in care, I will continue to support Charlotte Montero in the subsequent management and with ongoing continuity of care.

## 2024-11-01 NOTE — PROGRESS NOTES
"Virtual Visit Details    Type of service:  Video Visit     Originating Location (pt. Location): {video visit patient location:430111::\"Home\"}  {PROVIDER LOCATION On-site should be selected for visits conducted from your clinic location or adjoining [facility] hospital, academic office, or other nearby [facility] building. Off-site should be selected for all other provider locations, including home:274173}  Distant Location (provider location):  {virtual location provider:781512}  Platform used for Video Visit: {Virtual Visit Platforms:519871::\"[employer]\"}  "

## 2024-11-14 ENCOUNTER — TELEPHONE (OUTPATIENT)
Dept: ENDOCRINOLOGY | Facility: CLINIC | Age: 64
End: 2024-11-14
Payer: COMMERCIAL

## 2024-11-14 NOTE — TELEPHONE ENCOUNTER
Patient confirmed scheduled appointment:  Date: 4/22/25  Time: 3:30 pm  Visit type: return mwm  Provider: Grisel Baxter  Location: virtual  Testing/imaging: NA  Additional notes: appointment added to waitlist.

## 2024-11-18 ENCOUNTER — TELEPHONE (OUTPATIENT)
Dept: ENDOCRINOLOGY | Facility: CLINIC | Age: 64
End: 2024-11-18
Payer: COMMERCIAL

## 2024-11-18 NOTE — TELEPHONE ENCOUNTER
PA Initiation    Medication: ZEPBOUND 15 MG/0.5ML SC SOAJ  Insurance Company: [employer] - Phone [phone] Fax [phone]  Pharmacy Filling the Rx:    Filling Pharmacy Phone:    Filling Pharmacy Fax:    Start Date: 11/18/2024    [id number]

## 2024-11-19 NOTE — TELEPHONE ENCOUNTER
Prior Authorization RENEWAL Approval    Medication: ZEPBOUND 15 MG/0.5ML SC SOAJ  Authorization Effective Date: 11/19/2024  Authorization Expiration Date: 11/19/2025  Approved Dose/Quantity: 2ml per 28 days  Reference #: [id number]   Insurance Company: Eligible - [employer] [phone] Fax [phone]  Expected CoPay: $    CoPay Card Available:      Financial Assistance Needed:   Which Pharmacy is filling the prescription:    Pharmacy Notified:   Patient Notified:

## 2024-12-02 DIAGNOSIS — M17.11 PRIMARY OSTEOARTHRITIS OF RIGHT KNEE: ICD-10-CM

## 2024-12-02 DIAGNOSIS — M54.50 CHRONIC LOW BACK PAIN WITHOUT SCIATICA, UNSPECIFIED BACK PAIN LATERALITY: ICD-10-CM

## 2024-12-02 DIAGNOSIS — F11.90 CHRONIC, CONTINUOUS USE OF OPIOIDS: ICD-10-CM

## 2024-12-02 DIAGNOSIS — G89.29 CHRONIC LOW BACK PAIN WITHOUT SCIATICA, UNSPECIFIED BACK PAIN LATERALITY: ICD-10-CM

## 2024-12-02 DIAGNOSIS — K21.00 GASTROESOPHAGEAL REFLUX DISEASE WITH ESOPHAGITIS, UNSPECIFIED WHETHER HEMORRHAGE: ICD-10-CM

## 2024-12-05 RX ORDER — MELOXICAM 7.5 MG/1
7.5 TABLET ORAL DAILY
Qty: 30 TABLET | Refills: 0 | Status: SHIPPED | OUTPATIENT
Start: 2024-12-05

## 2024-12-05 RX ORDER — HYDROCODONE BITARTRATE AND ACETAMINOPHEN 10; 325 MG/1; MG/1
1 TABLET ORAL EVERY 6 HOURS PRN
Qty: 30 TABLET | Refills: 0 | Status: SHIPPED | OUTPATIENT
Start: 2024-12-05

## 2024-12-05 NOTE — TELEPHONE ENCOUNTER
She is due for office visit, DAWNA and CSA.  Additionally, I was contacted by GYN as she was to have had a colposcopy to follow-up on previous abnormal Pap smear.  This was recommended in May and has as of yet not been scheduled.  Prescriptions each refilled for 1 month.  No further refills without office visit.  Please help her get colposcopy scheduled as well.

## 2024-12-06 NOTE — TELEPHONE ENCOUNTER
Patient Contact     Attempt # 1     Was call answered?  No.  Left message on voicemail with information to call back.      RN called [facility] and patient has not picked up the prescriptions yet. RN informed the pharmacy that patient should contact the clinic for the nurse to discuss message below.     Cheyenne Knox RN on 12/6/2024 at 11:32 AM

## 2024-12-10 NOTE — TELEPHONE ENCOUNTER
Terrie returned call, message relayed. Assisted to schedule a preventative care visit. Patient aware she needs an updated urine drug screen and CSA. She will discuss the colposcopy with Dr Aguiar on 12/20/24.  Althea SHAFER RN

## 2024-12-20 PROBLEM — E66.01 CLASS 2 SEVERE OBESITY WITH SERIOUS COMORBIDITY AND BODY MASS INDEX (BMI) OF 37.0 TO 37.9 IN ADULT (H): Status: RESOLVED | Noted: 2020-02-19 | Resolved: 2024-12-20

## 2024-12-20 PROBLEM — E66.812 CLASS 2 SEVERE OBESITY WITH SERIOUS COMORBIDITY AND BODY MASS INDEX (BMI) OF 37.0 TO 37.9 IN ADULT (H): Status: RESOLVED | Noted: 2020-02-19 | Resolved: 2024-12-20

## 2024-12-26 ENCOUNTER — MYC MEDICAL ADVICE (OUTPATIENT)
Dept: FAMILY MEDICINE | Facility: CLINIC | Age: 64
End: 2024-12-26
Payer: COMMERCIAL

## 2024-12-31 ENCOUNTER — PATIENT OUTREACH (OUTPATIENT)
Dept: FAMILY MEDICINE | Facility: CLINIC | Age: 64
End: 2024-12-31
Payer: COMMERCIAL

## 2025-02-24 ENCOUNTER — PATIENT OUTREACH (OUTPATIENT)
Dept: CARE COORDINATION | Facility: CLINIC | Age: 65
End: 2025-02-24
Payer: COMMERCIAL

## 2025-03-04 ENCOUNTER — HOSPITAL ENCOUNTER (OUTPATIENT)
Dept: MRI IMAGING | Facility: CLINIC | Age: 65
Discharge: HOME | End: 2025-03-04
Attending: FAMILY MEDICINE
Payer: COMMERCIAL

## 2025-03-04 DIAGNOSIS — M79.2 CERVICAL SPINE ARTHRITIS WITH NERVE PAIN: ICD-10-CM

## 2025-03-04 DIAGNOSIS — M47.812 CERVICAL SPINE ARTHRITIS WITH NERVE PAIN: ICD-10-CM

## 2025-03-04 PROCEDURE — 72141 MRI NECK SPINE W/O DYE: CPT

## 2025-03-20 ENCOUNTER — OFFICE VISIT (OUTPATIENT)
Dept: PALLIATIVE MEDICINE | Facility: OTHER | Age: 65
End: 2025-03-20
Attending: FAMILY MEDICINE
Payer: COMMERCIAL

## 2025-03-20 VITALS — HEART RATE: 81 BPM | SYSTOLIC BLOOD PRESSURE: 120 MMHG | DIASTOLIC BLOOD PRESSURE: 76 MMHG | OXYGEN SATURATION: 97 %

## 2025-03-20 DIAGNOSIS — M47.812 CERVICAL SPONDYLOSIS WITHOUT MYELOPATHY: ICD-10-CM

## 2025-03-20 DIAGNOSIS — M47.812 CERVICAL SPINE ARTHRITIS WITH NERVE PAIN: ICD-10-CM

## 2025-03-20 DIAGNOSIS — M79.2 CERVICAL SPINE ARTHRITIS WITH NERVE PAIN: ICD-10-CM

## 2025-03-20 DIAGNOSIS — M79.18 MYOFASCIAL PAIN: Primary | ICD-10-CM

## 2025-03-20 PROCEDURE — 99213 OFFICE O/P EST LOW 20 MIN: CPT | Performed by: STUDENT IN AN ORGANIZED HEALTH CARE EDUCATION/TRAINING PROGRAM

## 2025-03-20 RX ORDER — TIZANIDINE 2 MG/1
2 TABLET ORAL 3 TIMES DAILY
Qty: 90 TABLET | Refills: 1 | Status: SHIPPED | OUTPATIENT
Start: 2025-03-20

## 2025-03-20 ASSESSMENT — PAIN SCALES - GENERAL: PAINLEVEL_OUTOF10: SEVERE PAIN (7)

## 2025-03-20 NOTE — PATIENT INSTRUCTIONS
Procedures:   TPI ordered  Bilateral C3-5 MBB/RFA process discussed, consider in future  Consider C-ILESI in future  Physical Therapy: completed, continue HEP  Diagnostic Studies:   MRI cervical spine reviewed  Medication Management:   Tizanidine 2mg up 3 times daily as needed  Follow up: 3 months    Kam Toledo MD  Interventional Pain Medicine  [facility]

## 2025-03-20 NOTE — PROGRESS NOTES
[facility] Med Spine Evaluation    Date of visit: 3/20/2025    Reason for consultation:    Charlotte Arredondo is a 64 year old female who is seen in consultation today for medical spine evaluation.    PCP is Marybel Aguiar.    Please see the [facility] health questionnaire which the patient completed and reviewed with me in detail.    Chief Complaint:    Chief Complaint   Patient presents with    Pain       Pain history:  Charlotte Arredondo is a 64 year old female presenting with a chief pain complaint of bilateral neck pain    Onset: 1+ years   Location and Radiation: bilateral neck, top of shoulders.  Provoking: driving  Palliating: laying flat on back  Quality: dull, sometimes burning  Severity: 0-8/10  Timing: intermittent but mostly present  Numbness: lateral right thumb  Weakness: endorses in hands    Low back pain is chronic too    Patient denies red flag symptoms of corresponding bowel/bladder symptoms, fever/chills, saddle anesthesia, profound motor loss, weight loss, or sudden unremitting increase in pain.    Current pain medications include:  Norco 30 tabs every 90 days - CSA with PCP - helpful for neck  Meloxicam 7.5mg daily - unsure if helpful  Methocarbamol - for low back but doesn't do anything for neck  Tylenol    Other relevant meds:  Fluoxeine    Previous medication treatments included:  none    Other treatments have included:  Charlotte Arredondo has not been seen at a pain clinic in the past.    PT: completed PT, [facility]  Injections:    CSI carpal tunnel on right - [facility]  Surgery: none  Alternative: chiropractor - did adjustments    Past Medical History:  Past Medical History:   Diagnosis Date    Basal cell carcinoma     Cervical high risk HPV (human papillomavirus) test positive 02/18/2020 4/23/21    Neoplasm of uncertain behavior of skin 03/13/2012    Tobacco use disorder 11/30/2005    Quit October 2007     Patient Active Problem  List    Diagnosis Date Noted    F11.9 - Chronic, continuous use of opioids 10/06/2023     Priority: Medium    Bilateral hearing loss, unspecified hearing loss type 10/06/2023     Priority: Medium    Primary osteoarthritis of right knee 10/06/2023     Priority: Medium    Gastroesophageal reflux disease with esophagitis, unspecified whether hemorrhage 11/22/2022     Priority: Medium    Urinary, incontinence, stress female 11/22/2022     Priority: Medium    Major depression in complete remission 04/28/2022     Priority: Medium    Uncomplicated opioid dependence (H) 04/28/2022     Priority: Medium    Stenosis of cervix uteri 06/09/2021     Priority: Medium    Cervical high risk HPV (human papillomavirus) test positive 02/18/2020     Priority: Medium     Warren done in 1983 and cone biopsy and cryotherapy per pt.  2014 NIL pap, Neg HPV.  2/19/20 NIL pap with + HR HPV 18. Plan colp.  06/08/20 Warren Bx - STEEVN 1. Cotest due 6/8/21.   4/23/21 NIL pap, + HR HPV 18. Plan colp due bef 7/23/21 6/9/21 Warren Bx & ECC no STEVEN. Plan: cotest in 1 year  4/28/22 NIL pap, +HR HPV 18. Plan: colposcopy due before 7/28/22 7/26/22 Colpo bx non diagnostic (no tissue present), ECC neg but rare to absent endocervical mucosa present. Plan: shallow LEEP due before 10/26/22  3/21/23 LEEP negative, inflammation, ECC neg. NIL pap, +HR HPV 18. Plan: cotest in 1 year  5/3/24 NIL pap, +HR HPV 18. Plan: colp due before 8/3/24  11/14/24 Lost to follow-up for pap tracking   12/20/24 NIL pap, neg HPV. Plan 1 year cotest      Controlled substance agreement signed 03/10/2016     Priority: Medium    Mixed stress and urge urinary incontinence 01/22/2016     Priority: Medium    Anxiety 03/05/2014     Priority: Medium    BMI 38.0-38.9,adult 05/12/2013     Priority: Medium    Major depression in complete remission (H) 04/20/2011     Priority: Medium     Probably mild seasonal affective disorder too, does have light box at home and encouraged to use  Off SSRIs, doing  well (PHQ-9 1), feels is benefiting from low dose HRT      CARDIOVASCULAR SCREENING; LDL GOAL LESS THAN 130 10/31/2010     Priority: Medium    NICOLETTE (obstructive sleep apnea)-Mild (AHI 6) 10/05/2010     Priority: Medium    Chronic low back pain 11/24/2008     Priority: Medium     724.5 BACK PAIN  Followed with Dr. Montano  Note: She has had therapy and does exercises and changed mattresses, but every once in a while she has low back pain precluding sleep.  Plan: HYDROCODONE-ACETAMINOPHEN 10/325  #40 at at time, taking 1/2 to 1 tablet at bedtime PRN        Refill -- she has been using this judiciously.  Over the past year (since spring 2014 - summer 2015) a supply of 40 tabs has generally been lasting about 3 months   Patient last seen regarding the back pain in November 2014, OK to continue quarterly refill when requested, call patient if requesting refill significantly before 3 months  Will obtain written narcotic agreement at next clinic visit.      Personal history of tobacco use 11/30/2005     Priority: Medium     Quit October 2007         Past Surgical History:  Past Surgical History:   Procedure Laterality Date    COLONOSCOPY  10/22/2012    Procedure: COLONOSCOPY;  Colonoscopy;  Surgeon: Robert Encinas MD;  Location: [facility]    CONIZATION LEE  1983    ENT SURGERY      LAPAROSCOPIC CHOLECYSTECTOMY  10/21/2010    LAPAROSCOPIC CHOLECYSTECTOMY performed by DAMIR WING at [facility]    SURGICAL HISTORY OF -   1973    Rt wrist surgery    SURGICAL HISTORY OF -   2004    breast augmentation    SURGICAL HISTORY OF -   10/15/2010    EUS - GB bulding w/sludge, Nl bile duct w/no choledocholithiasis or sludge.     SURGICAL HISTORY OF -   02/09/2006    1.  Left shoulder arthroscopy with arthroscopic subacromial decompression.     TONSILLECTOMY & ADENOIDECTOMY  as child    tonsils and adenoids     Medications:  Current Outpatient Medications   Medication Sig Dispense Refill    acetaminophen (TYLENOL) 650 MG CR tablet Take  1,300 mg by mouth every 8 hours as needed       acyclovir (ZOVIRAX) 5 % external cream Apply  topically. Apply to affected area thinly five times daily at the first sign of symptoms.      CALCIUM + D OR 1 TABLET DAILY      FLUoxetine (PROZAC) 20 MG capsule Take 1 capsule (20 mg) by mouth daily. 90 capsule 4    HYDROcodone-acetaminophen (NORCO)  MG per tablet TAKE ONE TABLET BY MOUTH EVERY 6 HOURS AS NEEDED FOR SEVERE PAIN 30 tablet 0    ibuprofen 200 MG capsule Take 200 mg by mouth every 4 hours as needed for fever      LORazepam (ATIVAN) 0.5 MG tablet Take 1 tablet (0.5 mg) by mouth every 6 hours as needed for anxiety 20 tablet 0    Magnesium 400 MG CAPS Take by mouth.      meloxicam (MOBIC) 7.5 MG tablet Take 1 tablet (7.5 mg) by mouth daily. 90 tablet 4    methocarbamol (ROBAXIN) 750 MG tablet TAKE ONE TO ONE AND ONE-HALF TABLETS BY MOUTH THREE TIMES DAILY AS NEEDED FOR MUSCLE SPASMS 45 tablet 1    MULTIVITAMINS OR 1 daily       omeprazole (PRILOSEC) 20 MG DR capsule Take 1 capsule (20 mg) by mouth daily. 90 capsule 4    ondansetron (ZOFRAN ODT) 4 MG ODT tab Take 1 tablet (4 mg) by mouth every 8 hours as needed for nausea 20 tablet 0    tirzepatide-Weight Management (ZEPBOUND) 15 MG/0.5ML prefilled pen Inject 0.5 mLs (15 mg) subcutaneously every 7 days. 2 mL 3    vibegron (GEMTESA) 75 MG TABS tablet Take 1 tablet (75 mg) by mouth daily. 90 tablet 4    vibegron (GEMTESA) 75 MG TABS tablet Take 1 tablet (75 mg) by mouth daily.      Vitamin D3 (CHOLECALCIFEROL) 125 MCG (5000 UT) tablet 6000 UNITs       Allergies:     Allergies   Allergen Reactions    Nkda [No Known Drug Allergy]          Family history:  Family History   Problem Relation Age of Onset    Diabetes Father         type 2    C.A.D. Father     Heart Disease Father         had 2 heart attacks,68 for first one 79 on second    Cancer - colorectal Paternal Grandfather     Diabetes Sister         type 2       Physical Exam:  Vitals:    03/20/25 1419    BP: 120/76   Pulse: 81   SpO2: 97%     Exam:  Constitutional: Well developed, NAD  Head: normocephalic. Atraumatic.   Eyes: no redness or jaundice noted   CV: warm, well perfused extremities   Skin: no suspicious lesions or rashes   Psychiatric: mentation appears normal and affect full    Neuromuscular Examination:  Normal ROM in cervical flexion, extension, bilateral lateral flexion, and bilateral rotation  Mild tenderness to palpation of the midline cervical spine or cervical paraspinals bilaterally  Normal 5/5 strength in BUE   Normal sensation to light touch in the C4-T1 distributions bilaterally   Negative Pham's on right, equivocal on left - positive once but unable to replicate    Spurling: negative bilaterally       Diagnostic tests:  EXAM: MR CERVICAL SPINE W/O CONTRAST  LOCATION: [facility]  DATE: 3/4/2025     INDICATION: Neck pain.  COMPARISON: None.  TECHNIQUE: MRI Cervical Spine without IV contrast.     FINDINGS: Straightening of the normal cervical lordosis which may be positional. No significant loss of vertebral body height. No focal destructive bony lesions. No abnormal cord signal.      Nodule in the right thyroid lobe measuring 0.8 cm. This typically would not require further follow-up.     Craniovertebral Junction and C1-C2: Normal.     C2-C3: Normal disc height. No herniation. Mild left facet hypertrophy. No spinal canal narrowing. No significant neural foraminal narrowing.     C3-C4: Moderate loss of disc height with degenerative endplate changes. Circumferential disc bulge with mild endplate osteophytic spurring. Left facet hypertrophy. No spinal canal narrowing. Moderate bilateral neural foraminal narrowing.      C4-C5: Moderate loss of disc height. Circumferential disc bulge with endplate osteophytic spurring. Mild facet hypertrophy. No spinal canal narrowing. Moderate right neural foraminal narrowing. Moderate to severe left neural foraminal narrowing.       C5-C6: Moderate loss of disc height. Circumferential disc bulge with endplate ossific spurring. Mild facet hypertrophy. No spinal canal narrowing. Moderate to severe bilateral neural foraminal narrowing.      C6-C7: Moderate loss of disc height. Posterior disc bulge. Normal facets. No spinal canal narrowing. Mild bilateral neural foraminal narrowing.      C7-T1: Normal disc height. No herniation. Normal facets. No spinal canal or neural foraminal stenosis.                                                                      IMPRESSION:  1.  Multilevel degenerative changes in the cervical spine, most pronounced in the mid cervical spine.  2.  At C3-C4, there is moderate bilateral neural foraminal narrowing.  3.  At C4-C5, there is moderate right and moderate to severe left neural foraminal narrowing.  4.  At C5-C6, there is moderate to severe bilateral neural foraminal narrowing.    Personally reviewed imaging: yes      Assessment:  Myofascial pain  Cervical spondylosis  Cervical radiculopathy    Charlotte Arredondo is a 64 year old female who presents with 1 year of bilateral neck pain, upper shoulder pain.  The pain is worse with driving, improved with laying down flat on a pillow.  The pain is typically nonradiating, but she reports associated numbness in the right lateral thumb.  She has tried medications for this, and is currently taking Norco for her low back chronically, but she notices this helps for the neck somewhat.  Other medications like methocarbamol and meloxicam have not been that helpful for the neck.  Examination today shows tenderness to palpation at the upper trapezius muscles, mild tenderness to palpation at the bilateral cervical paraspinals.  Spurling's test with downward pressure does cause pain in the neck, but no radiating pain.  Strength and sensation are preserved.  The MRI shows multiple levels of degenerative changes, with a significant disc bulge at C3-4 nearly contacting the anterior  spinal cord, but this does not appear to be correlating clinically.  I suspect this to be a combination of myofascial he mediated pain and cervical spondylosis from facet arthropathy.  The patient has completed physical therapy which was not helpful.  Given that she has tried multiple medications, it would be appropriate to proceed interventionally with trigger point injections, which have ordered.  We will also try tizanidine 2 mg up to 3 times a day as needed.  Will see her back in 3 months to reevaluate.    Plan:  Diagnosis reviewed, treatment option addressed, and risk/benefits discussed.     Procedures:   TPI ordered  Bilateral C3-5 MBB/RFA process discussed, consider in future  Consider C-ILESI in future  Physical Therapy: completed, continue HEP  Diagnostic Studies:   MRI cervical spine reviewed  Medication Management:   Tizanidine 2mg up 3 times daily as needed  Follow up: 3 months    Kam Toledo MD  Interventional Pain Medicine  [facility]

## 2025-03-20 NOTE — PROGRESS NOTES
Patient presents to the clinic today for a visit with Kam Toledo MD regarding Pain Management.        UDS/CSA- 12.20.2024    Medications- Norco day before yesterday, she has been taking for her neck more so lately    Notes    Elizabeth Olmedo  [facility] Clinical Assistant

## 2025-03-27 DIAGNOSIS — E66.01 CLASS 2 SEVERE OBESITY WITH SERIOUS COMORBIDITY AND BODY MASS INDEX (BMI) OF 35.0 TO 35.9 IN ADULT, UNSPECIFIED OBESITY TYPE (H): ICD-10-CM

## 2025-03-27 DIAGNOSIS — E66.812 CLASS 2 SEVERE OBESITY WITH SERIOUS COMORBIDITY AND BODY MASS INDEX (BMI) OF 35.0 TO 35.9 IN ADULT, UNSPECIFIED OBESITY TYPE (H): ICD-10-CM

## 2025-04-21 ASSESSMENT — PAIN SCALES - PAIN ENJOYMENT GENERAL ACTIVITY SCALE (PEG)
PEG_TOTALSCORE: 4
INTERFERED_ENJOYMENT_LIFE: 3
AVG_PAIN_PASTWEEK: 6
INTERFERED_GENERAL_ACTIVITY: 3

## 2025-04-22 ENCOUNTER — VIRTUAL VISIT (OUTPATIENT)
Dept: ENDOCRINOLOGY | Facility: CLINIC | Age: 65
End: 2025-04-22
Payer: COMMERCIAL

## 2025-04-22 VITALS — WEIGHT: 192 LBS | HEIGHT: 67 IN | BODY MASS INDEX: 30.13 KG/M2

## 2025-04-22 DIAGNOSIS — E66.812 CLASS 2 SEVERE OBESITY WITH SERIOUS COMORBIDITY AND BODY MASS INDEX (BMI) OF 35.0 TO 35.9 IN ADULT, UNSPECIFIED OBESITY TYPE (H): ICD-10-CM

## 2025-04-22 DIAGNOSIS — E66.01 CLASS 2 SEVERE OBESITY WITH SERIOUS COMORBIDITY AND BODY MASS INDEX (BMI) OF 35.0 TO 35.9 IN ADULT, UNSPECIFIED OBESITY TYPE (H): ICD-10-CM

## 2025-04-22 NOTE — LETTER
2025       RE: Charlotte Arredondo  [address] [place] [address]     Dear Colleague,    Thank you for referring your patient, Charlotte Arredondo, to the [facility] [place] [facility]. Please see a copy of my visit note below.    Virtual Visit Details    Type of service:  Video Visit     Originating Location (pt. Location): Home    Distant Location (provider location):  Off-site  Platform used for Video Visit: [facility] Medical Weight Management Note     Charlotte Arredondo  MRN:  [id number]  :  1960  JUSTICE:  2025    Dear Marybel Aguiar MD,    I had the pleasure of seeing your patient Charlotte Arredondo. She is a 64 year old female who I am continuing to see for treatment of obesity related to:        2022     4:38 PM   --   I have the following health issues associated with obesity Pre-Diabetes    High Cholesterol    Sleep Apnea    Stress Incontinence   I have the following symptoms associated with obesity Knee Pain    Depression    Back Pain    Fatigue       Assessment & Plan  Problem List Items Addressed This Visit    None  Visit Diagnoses       Class 2 severe obesity with serious comorbidity and body mass index (BMI) of 35.0 to 35.9 in adult, unspecified obesity type (H)        Relevant Medications    tirzepatide-Weight Management (ZEPBOUND) 15 MG/0.5ML prefilled pen           Continue Zepbound 15mg once weekly. Refills sent  Protein goal - 60g daily. Working on incorporating it into the middle of the day. Discussed option: protein shake, protein bar, greek yogurt  Grisel Mathias in 6 months       INTERVAL HISTORY:  New MWM - 2022 - Started Saxenda  2023 - wanted to continue Saxenda 3.0mg once daily.   Patient reached out via [employer] 2023 that Saxenda was no longer helpful with hunger, and would like to transition to Wegovy   Transitioned to Wegovy 1.7mg once weekly   Reached out  via [employer] 2/28/2023 and increase to Wegovy 2.4mg    Transitioned to Zepbound 3/27/2024      Continues to be weight stable and happy at this weight. Wants to continue to work on toning and mobility.     Anti-obesity medication history    Current:   Zepbound 15mg - continues to be helpful. No side effects.     Recent diet changes:   Struggles with eating earlier in the day and tends to eat later, but that has been getting better. She consumes about 2 meals, with average size portions. Trying to incorporate more protein in diet. About 60 oz of water per day.     Recent exercise/activity changes:   Having trouble with her knees (arthritis)  Walking about 2751-0878 steps a day   Going to be getting trigger point injections in neck     Recent stressors:   None     Recent sleep changes:   Sleep is adequate. Gets about 7 hours of sleep a night.     Vitamins/Labs:   Multivitamin  Magnesium    CURRENT WEIGHT:   192 lbs 0 oz    Initial Weight (lbs): 242 lbs  Last Visits Weight: 87.5 kg (193 lb)  Cumulative weight loss (lbs): 50  Weight Loss Percentage: 20.66%    Wt Readings from Last 5 Encounters:   04/22/25 87.1 kg (192 lb)   12/20/24 87.1 kg (192 lb)   11/01/24 87.5 kg (193 lb)   06/27/24 91.2 kg (201 lb)   05/03/24 92.1 kg (203 lb)             4/21/2025     4:29 PM   Changes and Difficulties   I have made the following changes to my diet since my last visit: About the same, less late night eating   With regards to my diet, I am still struggling with: N/A   I have made the following changes to my activity/exercise since my last visit: Still walking   With regards to my activity/exercise, I am still struggling with: Issues with knee and neck         MEDICATIONS:   Current Outpatient Medications   Medication Sig Dispense Refill     tirzepatide-Weight Management (ZEPBOUND) 15 MG/0.5ML prefilled pen Inject 0.5 mLs (15 mg) subcutaneously every 7 days. 6 mL 1     acetaminophen (TYLENOL) 650 MG CR tablet Take 1,300 mg by mouth  "every 8 hours as needed        acyclovir (ZOVIRAX) 5 % external cream Apply  topically. Apply to affected area thinly five times daily at the first sign of symptoms.       CALCIUM + D OR 1 TABLET DAILY       FLUoxetine (PROZAC) 20 MG capsule Take 1 capsule (20 mg) by mouth daily. 90 capsule 4     HYDROcodone-acetaminophen (NORCO)  MG per tablet TAKE ONE TABLET BY MOUTH EVERY 6 HOURS AS NEEDED FOR SEVERE PAIN 30 tablet 0     ibuprofen 200 MG capsule Take 200 mg by mouth every 4 hours as needed for fever       LORazepam (ATIVAN) 0.5 MG tablet Take 1 tablet (0.5 mg) by mouth every 6 hours as needed for anxiety 20 tablet 0     Magnesium 400 MG CAPS Take by mouth.       meloxicam (MOBIC) 7.5 MG tablet Take 1 tablet (7.5 mg) by mouth daily. 90 tablet 4     methocarbamol (ROBAXIN) 750 MG tablet TAKE ONE TO ONE AND ONE-HALF TABLETS BY MOUTH THREE TIMES DAILY AS NEEDED FOR MUSCLE SPASMS 45 tablet 1     MULTIVITAMINS OR 1 daily        omeprazole (PRILOSEC) 20 MG DR capsule Take 1 capsule (20 mg) by mouth daily. 90 capsule 4     ondansetron (ZOFRAN ODT) 4 MG ODT tab Take 1 tablet (4 mg) by mouth every 8 hours as needed for nausea 20 tablet 0     tiZANidine (ZANAFLEX) 2 MG tablet Take 1 tablet (2 mg) by mouth 3 times daily. 90 tablet 1     vibegron (GEMTESA) 75 MG TABS tablet Take 1 tablet (75 mg) by mouth daily. 90 tablet 4     vibegron (GEMTESA) 75 MG TABS tablet Take 1 tablet (75 mg) by mouth daily.       Vitamin D3 (CHOLECALCIFEROL) 125 MCG (5000 UT) tablet 6000 UNITs             4/21/2025     4:29 PM   Weight Loss Medication History Reviewed With Patient   Are you having any side effects from the weight loss medication that we have prescribed you? No       Objective   Ht 1.702 m (5' 7.01\")   Wt 87.1 kg (192 lb)   LMP 03/06/2011   BMI 30.06 kg/m           Vitals:  No vitals were obtained today due to virtual visit.      PHYSICAL EXAM:  GENERAL: alert and no distress  EYES: Eyes grossly normal to inspection.  No " discharge or erythema, or obvious scleral/conjunctival abnormalities.  RESP: No audible wheeze, cough, or visible cyanosis.    SKIN: Visible skin clear. No significant rash, abnormal pigmentation or lesions.  NEURO: Cranial nerves grossly intact.  Mentation and speech appropriate for age.  PSYCH: Appropriate affect, tone, and pace of words        Sincerely,  Margo Tony RN, BSN, DNP Student    I have personally seen and examined patient with  Margo Tony RN, TONON, DNP and I agree with findings, assessments and plan as documented. I was present for the entirety of the visit.     Grisel Baxter PA-C        31 minutes spent by me on the date of the encounter doing chart review, history and exam, documentation and further activities per the note    The longitudinal plan of care for the diagnosis(es)/condition(s) as documented were addressed during this visit. Due to the added complexity in care, I will continue to support Charlotte Montero in the subsequent management and with ongoing continuity of care.      Again, thank you for allowing me to participate in the care of your patient.      Sincerely,    Grisel Baxter PA-C

## 2025-04-22 NOTE — PROGRESS NOTES
Virtual Visit Details    Type of service:  Video Visit     Originating Location (pt. Location): Home    Distant Location (provider location):  Off-site  Platform used for Video Visit: [facility] Medical Weight Management Note     Charlotte Arredondo  MRN:  [id number]  :  1960  JUSTICE:  2025    Dear Marybel Aguiar MD,    I had the pleasure of seeing your patient Charlotte Arredondo. She is a 64 year old female who I am continuing to see for treatment of obesity related to:        2022     4:38 PM   --   I have the following health issues associated with obesity Pre-Diabetes    High Cholesterol    Sleep Apnea    Stress Incontinence   I have the following symptoms associated with obesity Knee Pain    Depression    Back Pain    Fatigue       Assessment & Plan   Problem List Items Addressed This Visit    None  Visit Diagnoses       Class 2 severe obesity with serious comorbidity and body mass index (BMI) of 35.0 to 35.9 in adult, unspecified obesity type (H)        Relevant Medications    tirzepatide-Weight Management (ZEPBOUND) 15 MG/0.5ML prefilled pen           Continue Zepbound 15mg once weekly. Refills sent  Protein goal - 60g daily. Working on incorporating it into the middle of the day. Discussed option: protein shake, protein bar, greek yogurt  Grisel Mathias in 6 months       INTERVAL HISTORY:  New MWM - 2022 - Started Saxenda  2023 - wanted to continue Saxenda 3.0mg once daily.   Patient reached out via [email or web address] 2023 that Saxenda was no longer helpful with hunger, and would like to transition to Wegovy   Transitioned to Wegovy 1.7mg once weekly   Reached out via [email or web address] 2023 and increase to Wegovy 2.4mg    Transitioned to Zepbound 3/27/2024      Continues to be weight stable and happy at this weight. Wants to continue to work on toning and mobility.     Anti-obesity medication history    Current:   Zepbound 15mg - continues to be helpful. No side effects.     Recent diet  changes:   Struggles with eating earlier in the day and tends to eat later, but that has been getting better. She consumes about 2 meals, with average size portions. Trying to incorporate more protein in diet. About 60 oz of water per day.     Recent exercise/activity changes:   Having trouble with her knees (arthritis)  Walking about 7422-6053 steps a day   Going to be getting trigger point injections in neck     Recent stressors:   None     Recent sleep changes:   Sleep is adequate. Gets about 7 hours of sleep a night.     Vitamins/Labs:   Multivitamin  Magnesium    CURRENT WEIGHT:   192 lbs 0 oz    Initial Weight (lbs): 242 lbs  Last Visits Weight: 87.5 kg (193 lb)  Cumulative weight loss (lbs): 50  Weight Loss Percentage: 20.66%    Wt Readings from Last 5 Encounters:   04/22/25 87.1 kg (192 lb)   12/20/24 87.1 kg (192 lb)   11/01/24 87.5 kg (193 lb)   06/27/24 91.2 kg (201 lb)   05/03/24 92.1 kg (203 lb)             4/21/2025     4:29 PM   Changes and Difficulties   I have made the following changes to my diet since my last visit: About the same, less late night eating   With regards to my diet, I am still struggling with: N/A   I have made the following changes to my activity/exercise since my last visit: Still walking   With regards to my activity/exercise, I am still struggling with: Issues with knee and neck         MEDICATIONS:   Current Outpatient Medications   Medication Sig Dispense Refill    tirzepatide-Weight Management (ZEPBOUND) 15 MG/0.5ML prefilled pen Inject 0.5 mLs (15 mg) subcutaneously every 7 days. 6 mL 1    acetaminophen (TYLENOL) 650 MG CR tablet Take 1,300 mg by mouth every 8 hours as needed       acyclovir (ZOVIRAX) 5 % external cream Apply  topically. Apply to affected area thinly five times daily at the first sign of symptoms.      CALCIUM + D OR 1 TABLET DAILY      FLUoxetine (PROZAC) 20 MG capsule Take 1 capsule (20 mg) by mouth daily. 90 capsule 4    HYDROcodone-acetaminophen (NORCO)  " MG per tablet TAKE ONE TABLET BY MOUTH EVERY 6 HOURS AS NEEDED FOR SEVERE PAIN 30 tablet 0    ibuprofen 200 MG capsule Take 200 mg by mouth every 4 hours as needed for fever      LORazepam (ATIVAN) 0.5 MG tablet Take 1 tablet (0.5 mg) by mouth every 6 hours as needed for anxiety 20 tablet 0    Magnesium 400 MG CAPS Take by mouth.      meloxicam (MOBIC) 7.5 MG tablet Take 1 tablet (7.5 mg) by mouth daily. 90 tablet 4    methocarbamol (ROBAXIN) 750 MG tablet TAKE ONE TO ONE AND ONE-HALF TABLETS BY MOUTH THREE TIMES DAILY AS NEEDED FOR MUSCLE SPASMS 45 tablet 1    MULTIVITAMINS OR 1 daily       omeprazole (PRILOSEC) 20 MG DR capsule Take 1 capsule (20 mg) by mouth daily. 90 capsule 4    ondansetron (ZOFRAN ODT) 4 MG ODT tab Take 1 tablet (4 mg) by mouth every 8 hours as needed for nausea 20 tablet 0    tiZANidine (ZANAFLEX) 2 MG tablet Take 1 tablet (2 mg) by mouth 3 times daily. 90 tablet 1    vibegron (GEMTESA) 75 MG TABS tablet Take 1 tablet (75 mg) by mouth daily. 90 tablet 4    vibegron (GEMTESA) 75 MG TABS tablet Take 1 tablet (75 mg) by mouth daily.      Vitamin D3 (CHOLECALCIFEROL) 125 MCG (5000 UT) tablet 6000 UNITs             4/21/2025     4:29 PM   Weight Loss Medication History Reviewed With Patient   Are you having any side effects from the weight loss medication that we have prescribed you? No       Objective    Ht 1.702 m (5' 7.01\")   Wt 87.1 kg (192 lb)   LMP 03/06/2011   BMI 30.06 kg/m           Vitals:  No vitals were obtained today due to virtual visit.      PHYSICAL EXAM:  GENERAL: alert and no distress  EYES: Eyes grossly normal to inspection.  No discharge or erythema, or obvious scleral/conjunctival abnormalities.  RESP: No audible wheeze, cough, or visible cyanosis.    SKIN: Visible skin clear. No significant rash, abnormal pigmentation or lesions.  NEURO: Cranial nerves grossly intact.  Mentation and speech appropriate for age.  PSYCH: Appropriate affect, tone, and pace of " words        Sincerely,  Margo Tony RN, BSN, DNP Student    I have personally seen and examined patient with  Margo Tony RN, BSN, DNP and I agree with findings, assessments and plan as documented. I was present for the entirety of the visit.     Grisel Baxter PA-C        31 minutes spent by me on the date of the encounter doing chart review, history and exam, documentation and further activities per the note    The longitudinal plan of care for the diagnosis(es)/condition(s) as documented were addressed during this visit. Due to the added complexity in care, I will continue to support Charlotte Montero in the subsequent management and with ongoing continuity of care.

## 2025-04-22 NOTE — PROGRESS NOTES
"Virtual Visit Details    Type of service:  Video Visit     Originating Location (pt. Location): {video visit patient location:331474::\"Home\"}  {PROVIDER LOCATION On-site should be selected for visits conducted from your clinic location or adjoining [facility] hospital, academic office, or other nearby [facility] building. Off-site should be selected for all other provider locations, including home:834612}  Distant Location (provider location):  {virtual location provider:958456}  Platform used for Video Visit: {Virtual Visit Platforms:493151::\"[employer]\"}        "

## 2025-04-22 NOTE — NURSING NOTE
Current patient location: [address]  [facility] [address]    Is the patient currently in the state of [place]? YES    Visit mode:VIDEO    If the visit is dropped, the patient can be reconnected by: VIDEO VISIT: Text to cell phone:   Telephone Information:   Mobile [phone]       Will anyone else be joining the visit? NO  (If patient encounters technical issues they should call [phone])    Are changes needed to the allergy or medication list? Pt stated no changes to allergies and Pt stated no med changes    Are refills needed on medications prescribed by this physician? YES zepbound    Reason for visit: RECHECK    Linda FRITZ

## 2025-04-23 NOTE — PATIENT INSTRUCTIONS
Visit Plan:     Continue Zepbound 15mg once weekly. Refills sent  Protein goal - 60g daily. Working on incorporating it into the middle of the day. Discussed option: protein shake, protein bar, greek yogurt  Grisel Mathias in 6 months

## 2025-04-24 ENCOUNTER — OFFICE VISIT (OUTPATIENT)
Dept: PALLIATIVE MEDICINE | Facility: OTHER | Age: 65
End: 2025-04-24
Payer: COMMERCIAL

## 2025-04-24 VITALS — SYSTOLIC BLOOD PRESSURE: 122 MMHG | HEART RATE: 78 BPM | OXYGEN SATURATION: 98 % | DIASTOLIC BLOOD PRESSURE: 75 MMHG

## 2025-04-24 DIAGNOSIS — M79.18 MYOFASCIAL PAIN: ICD-10-CM

## 2025-04-24 PROCEDURE — 20553 NJX 1/MLT TRIGGER POINTS 3/>: CPT | Performed by: STUDENT IN AN ORGANIZED HEALTH CARE EDUCATION/TRAINING PROGRAM

## 2025-04-24 PROCEDURE — 250N000011 HC RX IP 250 OP 636: Performed by: STUDENT IN AN ORGANIZED HEALTH CARE EDUCATION/TRAINING PROGRAM

## 2025-04-24 PROCEDURE — 250N000009 HC RX 250: Performed by: STUDENT IN AN ORGANIZED HEALTH CARE EDUCATION/TRAINING PROGRAM

## 2025-04-24 RX ORDER — BUPIVACAINE HYDROCHLORIDE 5 MG/ML
5 INJECTION, SOLUTION EPIDURAL; INTRACAUDAL; PERINEURAL ONCE
Status: COMPLETED | OUTPATIENT
Start: 2025-04-24 | End: 2025-04-24

## 2025-04-24 RX ORDER — LIDOCAINE HYDROCHLORIDE 10 MG/ML
5 INJECTION, SOLUTION EPIDURAL; INFILTRATION; INTRACAUDAL; PERINEURAL ONCE
Status: COMPLETED | OUTPATIENT
Start: 2025-04-24 | End: 2025-04-24

## 2025-04-24 RX ADMIN — BUPIVACAINE HYDROCHLORIDE 25 MG: 5 INJECTION, SOLUTION EPIDURAL; INTRACAUDAL; PERINEURAL at 15:10

## 2025-04-24 RX ADMIN — LIDOCAINE HYDROCHLORIDE 5 ML: 10 INJECTION, SOLUTION EPIDURAL; INFILTRATION; INTRACAUDAL; PERINEURAL at 15:10

## 2025-04-24 ASSESSMENT — PAIN SCALES - GENERAL
PAINLEVEL_OUTOF10: MILD PAIN (3)
PAINLEVEL_OUTOF10: MILD PAIN (3)

## 2025-04-24 NOTE — PATIENT INSTRUCTIONS
[facility]  Post Procedure Instructions      Today you saw:  Dr. Toledo      Today you had:  trigger point injections                            Medications used:  lidocaine   bupivicaine                Go to the emergency room if you develop any shortness of breath      Monitor the injection sites for signs and symptoms of infection-fever, chills, redness, swelling, warmth, or drainage to areas.      You may have soreness at injection sites for up to 24 hours.      You may apply ice to the painful areas to help minimize the discomfort of the needle pokes.      Do not apply heat to sites for at least 12 hours.      You may use anti-inflammatory medications or Tylenol for pain control if necessary       Pain Clinic phone number:  [phone]

## 2025-04-24 NOTE — PROGRESS NOTES
Patient presents to the clinic today for a visit  with Kam Toledo MD for trigger point injections.      QUESTIONS:    Miguelina Mendes MA  [facility]

## 2025-04-24 NOTE — PROGRESS NOTES
S: Patient endorses ongoing pain in bilateral neck and upper shoulder  O: /75   Pulse 78   LMP 03/06/2011   SpO2 98%    MSK - +TTP at following:  Bilateral cervical paraspinals  Bilateral levator scapulae  Bilateral upper trapezius    A/P:  Myofascial pain: proceed with trigger point injections today in clinic    Pre procedure Diagnosis: myofascial pain   Post procedure Diagnosis: Same  Procedure performed: trigger point injections, CPT code 75811  Anesthesia: none  Complications: none  Operators: Kam Toledo MD    Indications:   Charlotte Arredondo is a 64 year old female with a history of myofascial pain.  Exam shows myofascial pain of the muscle groups listed below, and they have tried conservative treatment including medications.    Options/alternatives, benefits and risks were discussed with the patient including bleeding, infection, tissue trauma and pneumothorax.  Questions were answered to her satisfaction and she agrees to proceed. Voluntary informed consent was obtained and signed.     Vitals were reviewed: Yes  Allergies were reviewed:  Yes   Medications were reviewed:  Yes   Pre-procedure pain score: 3/10    Procedure:  After getting informed consent, a Pause for the Cause was performed.    Trigger points were identified by patient, and marked when appropriate.  The area was prepped with Chloroprep.    Using clean technique, injections were completed using a 25G, 1.5 inch needle.  After negative aspiration, injection was completed.  A total of 10 locations were injected.  When possible, tissue was retracted from the chest wall to avoid lung injury.    Muscle groups injected:  Bilateral cervical paraspinals x2  Bilateral levator scapulae x1  Bilateral upper trapezius x2    Injection solution contained:  5ml of 1% lidocaine and 5ml of 0.5% bupivacaine.    Hemostasis was achieved, the area was cleaned, and bandaids were placed when appropriate.  The patient tolerated the procedure  well.    Post-procedure pain score: see flowsheet/10  Follow-up includes:   -repeat bebo Toledo MD  Interventional Pain Medicine  [facility]

## 2025-04-29 ENCOUNTER — TELEPHONE (OUTPATIENT)
Dept: ENDOCRINOLOGY | Facility: CLINIC | Age: 65
End: 2025-04-29
Payer: COMMERCIAL

## 2025-04-29 NOTE — TELEPHONE ENCOUNTER
Patient confirmed scheduled appointment:     Date: 10/24/25  Time: 8AM  Visit type: Return Weight Management  Visit mode: Virtual Visit  Provider:  Grisel Baxter PA-C  [place] [facility]    Additional Notes:

## 2025-05-14 DIAGNOSIS — F11.90 CHRONIC, CONTINUOUS USE OF OPIOIDS: ICD-10-CM

## 2025-05-14 DIAGNOSIS — M54.50 CHRONIC LOW BACK PAIN WITHOUT SCIATICA, UNSPECIFIED BACK PAIN LATERALITY: ICD-10-CM

## 2025-05-14 DIAGNOSIS — G89.29 CHRONIC LOW BACK PAIN WITHOUT SCIATICA, UNSPECIFIED BACK PAIN LATERALITY: ICD-10-CM

## 2025-05-15 RX ORDER — HYDROCODONE BITARTRATE AND ACETAMINOPHEN 10; 325 MG/1; MG/1
1 TABLET ORAL EVERY 6 HOURS PRN
Qty: 30 TABLET | Refills: 0 | Status: SHIPPED | OUTPATIENT
Start: 2025-05-15

## 2025-05-30 ENCOUNTER — OFFICE VISIT (OUTPATIENT)
Dept: ORTHOPEDICS | Facility: CLINIC | Age: 65
End: 2025-05-30
Payer: COMMERCIAL

## 2025-05-30 ENCOUNTER — ANCILLARY PROCEDURE (OUTPATIENT)
Dept: GENERAL RADIOLOGY | Facility: CLINIC | Age: 65
End: 2025-05-30
Attending: FAMILY MEDICINE
Payer: COMMERCIAL

## 2025-05-30 DIAGNOSIS — G89.29 CHRONIC PAIN OF LEFT THUMB: Primary | ICD-10-CM

## 2025-05-30 DIAGNOSIS — M79.645 CHRONIC PAIN OF LEFT THUMB: ICD-10-CM

## 2025-05-30 DIAGNOSIS — M18.12 ARTHRITIS OF CARPOMETACARPAL (CMC) JOINT OF LEFT THUMB: ICD-10-CM

## 2025-05-30 DIAGNOSIS — G89.29 CHRONIC PAIN OF LEFT THUMB: ICD-10-CM

## 2025-05-30 DIAGNOSIS — M79.645 CHRONIC PAIN OF LEFT THUMB: Primary | ICD-10-CM

## 2025-05-30 DIAGNOSIS — G56.01 RIGHT CARPAL TUNNEL SYNDROME: ICD-10-CM

## 2025-05-30 PROCEDURE — 20526 THER INJECTION CARP TUNNEL: CPT | Mod: 51 | Performed by: FAMILY MEDICINE

## 2025-05-30 PROCEDURE — 99214 OFFICE O/P EST MOD 30 MIN: CPT | Mod: 25 | Performed by: FAMILY MEDICINE

## 2025-05-30 PROCEDURE — 76942 ECHO GUIDE FOR BIOPSY: CPT | Mod: RT | Performed by: FAMILY MEDICINE

## 2025-05-30 PROCEDURE — 20604 DRAIN/INJ JOINT/BURSA W/US: CPT | Mod: FA | Performed by: FAMILY MEDICINE

## 2025-05-30 PROCEDURE — 73110 X-RAY EXAM OF WRIST: CPT | Mod: TC | Performed by: RADIOLOGY

## 2025-05-30 RX ADMIN — LIDOCAINE HYDROCHLORIDE 1 ML: 10 INJECTION, SOLUTION INFILTRATION; PERINEURAL at 14:10

## 2025-05-30 RX ADMIN — BETAMETHASONE SODIUM PHOSPHATE AND BETAMETHASONE ACETATE 6 MG: 3; 3 INJECTION, SUSPENSION INTRA-ARTICULAR; INTRALESIONAL; INTRAMUSCULAR; SOFT TISSUE at 14:10

## 2025-05-30 RX ADMIN — ROPIVACAINE HYDROCHLORIDE 1 ML: 5 INJECTION, SOLUTION EPIDURAL; INFILTRATION; PERINEURAL at 14:10

## 2025-05-30 NOTE — PATIENT INSTRUCTIONS
# Right Carpal Tunnel, 4th Digit Trigger Finger, Left CMC Arthritis: Charlotte Arredondo  was seen today for right carpal tunnel, 4th digit trigger finger, left CMC arthritis. Symptoms had been going on for weeks right 4th digit, 6+ mon for right carpal tunnel, left thumb. On examination there are positive findings of tenderness to palpation over the left CMC, right 4th trigger finger, positive right carpal tunnel testing. Imaging findings showed severe left CMC arthritis. Likely cause of patient's condition due to flare of left CMC arthritis, right 4th trigger finger, right carpal tunnel.  Counseled patient on nature of condition and treatment options.  Given this plan as below, follow-up 1 mon as needed.     Image Findings: left CMC arthritis  Treatment: Activities as tolerated, finger splint at night  Medications/Injections: Limited tylenol/ibuprofen for pain for 1-2 weeks, Topical Voltaren gel, left CMC joint and right carpal tunnel steroid injections  Follow-up: In one month if symptoms do not improve, sooner if worsening  Can consider follow-up with ortho surgery    Please call [phone]   Ask for my team if you have any questions or concerns    If you have not yet received the influenza vaccine but would like to get one, please call  [phone] or you can schedule via [employer]    It was great seeing you again today!    Harley Peace MD, [facility] Injection Discharge Instructions    Procedure: right carpal tunnel, left CMC joint steroid injections    You may shower, however avoid swimming, tub baths or hot tubs for 24 hours following your procedure  You may have a mild to moderate increase in pain for several days following the injection.  It may take up to 14 days for the steroid medication to start working although you may feel the effect as early as a few days after the procedure.  You may use ice packs for 10-15 minutes, 3 to 4 times a day at the injection site for comfort  You may use  anti-inflammatory medications (such as Ibuprofen or Aleve or Advil) or Tylenol for pain control if necessary  If you were fasting, you may resume your normal diet and medications after the procedure  If you have diabetes, check your blood sugar more frequently than usual as your blood sugar may be higher than normal for 10-14 days following a steroid injection. Contact your doctor who manages your diabetes if your blood sugar is higher than usual    If you experience any of the following, call [facility] @ [phone] or [phone]  -Fever over 100 degree F  -Swelling, bleeding, redness, drainage, warmth at the injection site  - New or worsening pain

## 2025-05-30 NOTE — LETTER
5/30/2025      Charlotte Arredondo  [address] [place] [address]      Dear Colleague,    Thank you for referring your patient, Charlotte Arredondo, to the [facility] [place]. Please see a copy of my visit note below.    ASSESSMENT & PLAN    Charlotte Montero was seen today for follow up, pain and pain.    Diagnoses and all orders for this visit:    Chronic pain of left thumb  -     XR Wrist Left G/E 3 Views; Future  -     POC US GUIDANCE NEEDLE PLACEMENT; Future  -     Small Joint Injection/Arthrocentesis: L thumb CMC    Arthritis of carpometacarpal (CMC) joint of left thumb  -     POC US GUIDANCE NEEDLE PLACEMENT; Future    Right carpal tunnel syndrome  -     POC US GUIDANCE NEEDLE PLACEMENT; Future  -     Hand / Upper Extremity Injection/Arthrocentesis: R carpal tunnel  -     Orthopedic  Referral; Future        # Right Carpal Tunnel, 4th Digit Trigger Finger, Left CMC Arthritis: Charlotte Arredondo  was seen today for right carpal tunnel, 4th digit trigger finger, left CMC arthritis. Symptoms had been going on for weeks right 4th digit, 6+ mon for right carpal tunnel, left thumb. On examination there are positive findings of tenderness to palpation over the left CMC, right 4th trigger finger, positive right carpal tunnel testing. Imaging findings showed severe left CMC arthritis. Likely cause of patient's condition due to flare of left CMC arthritis, right 4th trigger finger, right carpal tunnel.  Counseled patient on nature of condition and treatment options.  Given this plan as below, follow-up 1 mon as needed.     Image Findings: left CMC arthritis  Treatment: Activities as tolerated, finger splint at night  Medications/Injections: Limited tylenol/ibuprofen for pain for 1-2 weeks, Topical Voltaren gel, left CMC joint and right carpal tunnel steroid injections  Follow-up: In one month if symptoms do not improve, sooner if worsening  Can consider follow-up with ortho  surgery    -----    SUBJECTIVE:  Charlotte Arredondo is a 64 year old female who is seen in follow-up for right hand.They were last seen 9/30/24 and right carpal tunnel steroid injection was performed.  The patient is seen by themselves.    Since their last visit reports returned right hand numbness and tingling and persisting left thumb pain.  New right ring trigger finger. They have tried right carpal tunnel steroid injection 9/30/24, brace, stretching.        Patient's past medical, surgical, social, and family histories were reviewed today and no changes are noted.    REVIEW OF SYSTEMS:  Constitutional: NEGATIVE for fever, chills, change in weight  Skin: NEGATIVE for worrisome rashes, moles or lesions  GI/: NEGATIVE for bowel or bladder changes  Neuro: NEGATIVE for weakness, dizziness or paresthesias    OBJECTIVE:  LMP 03/06/2011    General: healthy, alert and in no distress  HEENT: no scleral icterus or conjunctival erythema  Skin: no suspicious lesions or rash. No jaundice.  CV: regular rhythm by palpation, no pedal edema  Resp: normal respiratory effort without conversational dyspnea   Psych: normal mood and affect  Gait: normal steady gait with appropriate coordination and balance  Neuro: normal light touch sensory exam of the extremities.    MSK:    BILATERAL HAND  Inspection:    No swelling, bruising, discoloration, or obvious deformity or asymmetry  Palpation:   Carpals: normal   Metacarpals: normal   Thumb: CMC on left   Fingers: Triggering right 4th digit at A1 pulley  Range of Motion:    Full active flexion and extension at MCP, PIP, and DIP joints; normal finger cascade without malrotation.  Wrist pronation, supination, and ulnar/radial deviation normal.  Strength:     full  Special Tests:    Positive: Tinel's, Phalen's on right, CMC grind on left    Negative: flexor digitorum superficialis testing, flexor digitorum profundus testing    Independent visualization of the below image:    Left wrist  x-rays showing severe left CMC arthritis     Harley Peace MD, [facility]    Disclaimer: This note consists of symbols derived from keyboarding, dictation and/or voice recognition software. As a result, there may be errors in the script that have gone undetected. Please consider this when interpreting information found in this chart.    Hand / Upper Extremity Injection/Arthrocentesis: R carpal tunnel    Date/Time: 5/30/2025 2:10 PM    Performed by: Harley Peace MD  Authorized by: Harley Peace MD    Indications:  Pain and therapeutic  Needle Size:  25 G  Guidance: ultrasound    Approach:  Volar  Condition: carpal tunnel      Site:  R carpal tunnel  Medications:  6 mg betamethasone acet & sod phos 6 (3-3) MG/ML; 1 mL lidocaine 1 %  Outcome:  Tolerated well, no immediate complications  Procedure discussed: discussed risks, benefits, and alternatives    Consent Given by:  Patient  Timeout: timeout called immediately prior to procedure    Prep: patient was prepped and draped in usual sterile fashion     Ultrasound images of procedure were permanently stored.     Patient tolerated left CMC, right carpal tunnel steroid injections.  Ultrasound guided images were permanently stored.   Aftercare instructions given to patient.  Plan to follow-up as discussed above.     Harley Peace MD [facility]      Small Joint Injection/Arthrocentesis: L thumb CMC    Date/Time: 5/30/2025 2:10 PM    Performed by: Harley Peace MD  Authorized by: Harley Peace MD  Indications:  Pain  Needle Size:  25 G  Guidance: ultrasound     Approach:  Radial  Location:  Thumb    Site:  L thumb CMC                    Medications:  6 mg betamethasone acet & sod phos 6 (3-3) MG/ML; 1 mL ROPivacaine 5 MG/ML        Outcome:  Tolerated well, no immediate complications  Procedure discussed: discussed risks, benefits, and alternatives    Consent Given by:   Patient  Timeout: timeout called immediately prior to procedure    Prep: patient was prepped and draped in usual sterile fashion       Ultrasound images of procedure were permanently stored.             Again, thank you for allowing me to participate in the care of your patient.        Sincerely,        Harley Peace MD    Electronically signed

## 2025-05-30 NOTE — PROGRESS NOTES
ASSESSMENT & PLAN    Charlotte Montero was seen today for follow up, pain and pain.    Diagnoses and all orders for this visit:    Chronic pain of left thumb  -     XR Wrist Left G/E 3 Views; Future  -     POC US GUIDANCE NEEDLE PLACEMENT; Future  -     Small Joint Injection/Arthrocentesis: L thumb CMC    Arthritis of carpometacarpal (CMC) joint of left thumb  -     POC US GUIDANCE NEEDLE PLACEMENT; Future    Right carpal tunnel syndrome  -     POC US GUIDANCE NEEDLE PLACEMENT; Future  -     Hand / Upper Extremity Injection/Arthrocentesis: R carpal tunnel  -     Orthopedic  Referral; Future        # Right Carpal Tunnel, 4th Digit Trigger Finger, Left CMC Arthritis: Charlotte Arredondo  was seen today for right carpal tunnel, 4th digit trigger finger, left CMC arthritis. Symptoms had been going on for weeks right 4th digit, 6+ mon for right carpal tunnel, left thumb. On examination there are positive findings of tenderness to palpation over the left CMC, right 4th trigger finger, positive right carpal tunnel testing. Imaging findings showed severe left CMC arthritis. Likely cause of patient's condition due to flare of left CMC arthritis, right 4th trigger finger, right carpal tunnel.  Counseled patient on nature of condition and treatment options.  Given this plan as below, follow-up 1 mon as needed.     Image Findings: left CMC arthritis  Treatment: Activities as tolerated, finger splint at night  Medications/Injections: Limited tylenol/ibuprofen for pain for 1-2 weeks, Topical Voltaren gel, left CMC joint and right carpal tunnel steroid injections  Follow-up: In one month if symptoms do not improve, sooner if worsening  Can consider follow-up with ortho surgery    -----    SUBJECTIVE:  Charlotte Arredondo is a 64 year old female who is seen in follow-up for right hand.They were last seen 9/30/24 and right carpal tunnel steroid injection was performed.  The patient is seen by themselves.    Since their last visit reports  returned right hand numbness and tingling and persisting left thumb pain.  New right ring trigger finger. They have tried right carpal tunnel steroid injection 9/30/24, brace, stretching.        Patient's past medical, surgical, social, and family histories were reviewed today and no changes are noted.    REVIEW OF SYSTEMS:  Constitutional: NEGATIVE for fever, chills, change in weight  Skin: NEGATIVE for worrisome rashes, moles or lesions  GI/: NEGATIVE for bowel or bladder changes  Neuro: NEGATIVE for weakness, dizziness or paresthesias    OBJECTIVE:  LMP 03/06/2011    General: healthy, alert and in no distress  HEENT: no scleral icterus or conjunctival erythema  Skin: no suspicious lesions or rash. No jaundice.  CV: regular rhythm by palpation, no pedal edema  Resp: normal respiratory effort without conversational dyspnea   Psych: normal mood and affect  Gait: normal steady gait with appropriate coordination and balance  Neuro: normal light touch sensory exam of the extremities.    MSK:    BILATERAL HAND  Inspection:    No swelling, bruising, discoloration, or obvious deformity or asymmetry  Palpation:   Carpals: normal   Metacarpals: normal   Thumb: CMC on left   Fingers: Triggering right 4th digit at A1 pulley  Range of Motion:    Full active flexion and extension at MCP, PIP, and DIP joints; normal finger cascade without malrotation.  Wrist pronation, supination, and ulnar/radial deviation normal.  Strength:     full  Special Tests:    Positive: Tinel's, Phalen's on right, CMC grind on left    Negative: flexor digitorum superficialis testing, flexor digitorum profundus testing    Independent visualization of the below image:    Left wrist x-rays showing severe left CMC arthritis     Harley Peace MD, [facility]    Disclaimer: This note consists of symbols derived from keyboarding, dictation and/or voice recognition software. As a result, there may be errors in the script  that have gone undetected. Please consider this when interpreting information found in this chart.    Hand / Upper Extremity Injection/Arthrocentesis: R carpal tunnel    Date/Time: 5/30/2025 2:10 PM    Performed by: Harley Peace MD  Authorized by: Harley Peace MD    Indications:  Pain and therapeutic  Needle Size:  25 G  Guidance: ultrasound    Approach:  Volar  Condition: carpal tunnel      Site:  R carpal tunnel  Medications:  6 mg betamethasone acet & sod phos 6 (3-3) MG/ML; 1 mL lidocaine 1 %  Outcome:  Tolerated well, no immediate complications  Procedure discussed: discussed risks, benefits, and alternatives    Consent Given by:  Patient  Timeout: timeout called immediately prior to procedure    Prep: patient was prepped and draped in usual sterile fashion     Ultrasound images of procedure were permanently stored.     Patient tolerated left CMC, right carpal tunnel steroid injections.  Ultrasound guided images were permanently stored.   Aftercare instructions given to patient.  Plan to follow-up as discussed above.     Harley Peace MD [facility]      Small Joint Injection/Arthrocentesis: L thumb CMC    Date/Time: 5/30/2025 2:10 PM    Performed by: Harley Peace MD  Authorized by: Harley Peace MD  Indications:  Pain  Needle Size:  25 G  Guidance: ultrasound     Approach:  Radial  Location:  Thumb    Site:  L thumb CMC                    Medications:  6 mg betamethasone acet & sod phos 6 (3-3) MG/ML; 1 mL ROPivacaine 5 MG/ML        Outcome:  Tolerated well, no immediate complications  Procedure discussed: discussed risks, benefits, and alternatives    Consent Given by:  Patient  Timeout: timeout called immediately prior to procedure    Prep: patient was prepped and draped in usual sterile fashion       Ultrasound images of procedure were permanently stored.

## 2025-06-02 ENCOUNTER — PATIENT OUTREACH (OUTPATIENT)
Dept: CARE COORDINATION | Facility: CLINIC | Age: 65
End: 2025-06-02
Payer: COMMERCIAL

## 2025-06-04 ENCOUNTER — PATIENT OUTREACH (OUTPATIENT)
Dept: CARE COORDINATION | Facility: CLINIC | Age: 65
End: 2025-06-04
Payer: COMMERCIAL

## 2025-06-05 RX ORDER — ROPIVACAINE HYDROCHLORIDE 5 MG/ML
1 INJECTION, SOLUTION EPIDURAL; INFILTRATION; PERINEURAL
Status: COMPLETED | OUTPATIENT
Start: 2025-05-30 | End: 2025-05-30

## 2025-06-05 RX ORDER — BETAMETHASONE SODIUM PHOSPHATE AND BETAMETHASONE ACETATE 3; 3 MG/ML; MG/ML
6 INJECTION, SUSPENSION INTRA-ARTICULAR; INTRALESIONAL; INTRAMUSCULAR; SOFT TISSUE
Status: COMPLETED | OUTPATIENT
Start: 2025-05-30 | End: 2025-05-30

## 2025-06-05 RX ORDER — LIDOCAINE HYDROCHLORIDE 10 MG/ML
1 INJECTION, SOLUTION INFILTRATION; PERINEURAL
Status: COMPLETED | OUTPATIENT
Start: 2025-05-30 | End: 2025-05-30

## 2025-06-10 ENCOUNTER — MYC MEDICAL ADVICE (OUTPATIENT)
Dept: ENDOCRINOLOGY | Facility: CLINIC | Age: 65
End: 2025-06-10
Payer: COMMERCIAL

## 2025-06-10 DIAGNOSIS — E66.01 CLASS 2 SEVERE OBESITY WITH SERIOUS COMORBIDITY AND BODY MASS INDEX (BMI) OF 35.0 TO 35.9 IN ADULT, UNSPECIFIED OBESITY TYPE (H): Primary | ICD-10-CM

## 2025-06-10 DIAGNOSIS — E66.812 CLASS 2 SEVERE OBESITY WITH SERIOUS COMORBIDITY AND BODY MASS INDEX (BMI) OF 35.0 TO 35.9 IN ADULT, UNSPECIFIED OBESITY TYPE (H): Primary | ICD-10-CM

## 2025-06-12 RX ORDER — SEMAGLUTIDE 2.4 MG/.75ML
2.4 INJECTION, SOLUTION SUBCUTANEOUS WEEKLY
Qty: 3 ML | Refills: 3 | Status: SHIPPED | OUTPATIENT
Start: 2025-06-12

## 2025-06-14 ENCOUNTER — HEALTH MAINTENANCE LETTER (OUTPATIENT)
Age: 65
End: 2025-06-14

## 2025-07-03 ENCOUNTER — MYC MEDICAL ADVICE (OUTPATIENT)
Dept: ENDOCRINOLOGY | Facility: CLINIC | Age: 65
End: 2025-07-03
Payer: COMMERCIAL

## 2025-07-03 DIAGNOSIS — E66.01 CLASS 2 SEVERE OBESITY WITH SERIOUS COMORBIDITY AND BODY MASS INDEX (BMI) OF 35.0 TO 35.9 IN ADULT, UNSPECIFIED OBESITY TYPE (H): ICD-10-CM

## 2025-07-03 DIAGNOSIS — E66.812 CLASS 2 SEVERE OBESITY WITH SERIOUS COMORBIDITY AND BODY MASS INDEX (BMI) OF 35.0 TO 35.9 IN ADULT, UNSPECIFIED OBESITY TYPE (H): ICD-10-CM

## 2025-07-15 NOTE — PROGRESS NOTES
CHIEF COMPLAINT:   Chief Complaint   Patient presents with    Right Wrist - Pain, Numbness    Right Ring Finger - Pain    Left Thumb - Pain       Charlotte Arredondo is seen today in the [facility] for evaluation of right carpal tunnel syndrome  at the request of Dr. Harley Peace       HISTORY:  Charlotte Arredondo is a 64 year old female , Right -hand dominant who is seen in for Right hand numbness and tingling, pain, and weakness greater than 1  years.  she has numbness and tingling in 1,2 digits. The thumb might be always a little numb. Other symptoms include cramping in thenar eminence, significant, and waking up at night.  She is also wanting to consult for her right ring finger, trigger finger. She is also wondering about her left thumb, CMC as well.      Symptoms are worse with sleeping at night time, using, trimming, grabbing, pulling, excessive use.  Better with corticosteroid, but briefly.      Treatment has been   a splint, NSAIDs, and injections, resting.  Right carpal tunnel Injection 5/30/2025. Has helped some, but not completely, the thumb tip is still numb.    Left thumb carpometacarpal injection 5/30/2025    Also right ring trigger finger the past few months, will get stuck and have to move it back. Has been using an oval-8 finger splint which is helping.    Denies numbness and tingling left hand.    Has some neck pain, recent trigger point injections.      Suspected cause: Due to unknown factors.    Pain severity: 3/10  Pain quality: dull, aching, and burning  Frequency of symptoms: are constant.  Aggravating Factors: activities, use, night.  Relieving Factors: wrist brace, NSAIDS. Shaking hands.  Previous modalities tried: a splint, NSAIDs, and injections   Usual level of work activity: retired       Other PMH:  has a past medical history of Basal cell carcinoma, Cervical high risk HPV (human papillomavirus) test positive (02/18/2020), Neoplasm of uncertain behavior  of skin (03/13/2012), and Tobacco use disorder (11/30/2005).  Patient Active Problem List   Diagnosis    Personal history of tobacco use    Chronic low back pain    NICOLETTE (obstructive sleep apnea)-Mild (AHI 6)    CARDIOVASCULAR SCREENING; LDL GOAL LESS THAN 130    Major depression in complete remission (H)    BMI 38.0-38.9,adult    Anxiety    Mixed stress and urge urinary incontinence    Controlled substance agreement signed    Cervical high risk HPV (human papillomavirus) test positive    Stenosis of cervix uteri    Major depression in complete remission    Uncomplicated opioid dependence (H)    Gastroesophageal reflux disease with esophagitis, unspecified whether hemorrhage    Urinary, incontinence, stress female    F11.9 - Chronic, continuous use of opioids    Bilateral hearing loss, unspecified hearing loss type    Primary osteoarthritis of right knee       Surgical Hx:  has a past surgical history that includes tonsillectomy & adenoidectomy (as child); surgical history of -  (1973); surgical history of -  (2004); ENT surgery; surgical history of -  (10/15/2010); surgical history of -  (02/09/2006); Laparoscopic cholecystectomy (10/21/2010); Colonoscopy (10/22/2012); and Conization leep (1983).    Medications:   Current Outpatient Medications:     acetaminophen (TYLENOL) 650 MG CR tablet, Take 1,300 mg by mouth every 8 hours as needed , Disp: , Rfl:     acyclovir (ZOVIRAX) 5 % external cream, Apply  topically. Apply to affected area thinly five times daily at the first sign of symptoms., Disp: , Rfl:     CALCIUM + D OR, 1 TABLET DAILY, Disp: , Rfl:     FLUoxetine (PROZAC) 20 MG capsule, Take 1 capsule (20 mg) by mouth daily., Disp: 90 capsule, Rfl: 4    HYDROcodone-acetaminophen (NORCO)  MG per tablet, TAKE ONE TABLET BY MOUTH EVERY 6 HOURS AS NEEDED FOR SEVERE PAIN, Disp: 30 tablet, Rfl: 0    ibuprofen 200 MG capsule, Take 200 mg by mouth every 4 hours as needed for fever, Disp: , Rfl:     LORazepam  (ATIVAN) 0.5 MG tablet, Take 1 tablet (0.5 mg) by mouth every 6 hours as needed for anxiety, Disp: 20 tablet, Rfl: 0    Magnesium 400 MG CAPS, Take by mouth., Disp: , Rfl:     meloxicam (MOBIC) 7.5 MG tablet, Take 1 tablet (7.5 mg) by mouth daily., Disp: 90 tablet, Rfl: 4    methocarbamol (ROBAXIN) 750 MG tablet, TAKE ONE TO ONE AND ONE-HALF TABLETS BY MOUTH THREE TIMES DAILY AS NEEDED FOR MUSCLE SPASMS, Disp: 45 tablet, Rfl: 1    MULTIVITAMINS OR, 1 daily , Disp: , Rfl:     omeprazole (PRILOSEC) 20 MG DR capsule, Take 1 capsule (20 mg) by mouth daily., Disp: 90 capsule, Rfl: 4    ondansetron (ZOFRAN ODT) 4 MG ODT tab, Take 1 tablet (4 mg) by mouth every 8 hours as needed for nausea, Disp: 20 tablet, Rfl: 0    Semaglutide-Weight Management (WEGOVY) 2.4 MG/0.75ML pen, Inject 2.4 mg subcutaneously once a week., Disp: 3 mL, Rfl: 3    tirzepatide-Weight Management (ZEPBOUND) 15 MG/0.5ML prefilled pen, Inject 0.5 mLs (15 mg) subcutaneously every 7 days., Disp: 6 mL, Rfl: 1    tiZANidine (ZANAFLEX) 2 MG tablet, Take 1 tablet (2 mg) by mouth 3 times daily., Disp: 90 tablet, Rfl: 1    vibegron (GEMTESA) 75 MG TABS tablet, Take 1 tablet (75 mg) by mouth daily., Disp: 90 tablet, Rfl: 4    vibegron (GEMTESA) 75 MG TABS tablet, Take 1 tablet (75 mg) by mouth daily., Disp: , Rfl:     Vitamin D3 (CHOLECALCIFEROL) 125 MCG (5000 UT) tablet, 6000 UNITs, Disp: , Rfl:     Allergies:   Allergies   Allergen Reactions    Nkda [No Known Drug Allergy]        Social Hx: retired.  reports that she quit smoking about 9 years ago. Her smoking use included cigarettes. She started smoking about 29 years ago. She has a 10 pack-year smoking history. She has never used smokeless tobacco. She reports that she does not drink alcohol and does not use drugs.    Family Hx: family history includes C.A.D. in her father; Cancer - colorectal in her paternal grandfather; Diabetes in her father and sister; Heart Disease in her father..    REVIEW OF SYSTEMS:  "10 point ROS neg other than the symptoms noted above in the HPI and PMH. Notables include  CONSTITUTIONAL:NEGATIVE for fever, chills, change in weight  INTEGUMENTARY/SKIN: NEGATIVE for worrisome rashes, moles or lesions  MUSCULOSKELETAL:See HPI above  Neurology: see HPI above.      EXAM:  Ht 1.702 m (5' 7\")   Wt 88.5 kg (195 lb)   LMP 03/06/2011   BMI 30.54 kg/m    GENERAL APPEARANCE: healthy, alert and no distress   GAIT: NORMAL  SKIN: no suspicious lesions or rashes  RESPIRATORY: No increased work of breathing.  NEURO:     strength: normal,     Thenar atrophy:Mild.    Sensation intact in  all digits,    reflexes normal in upper extremities.   PSYCH:  mentation appears normal and affect normal, not anxious.    MUSCULOSKELETAL:    RIGHT HAND/FINGERS:    Skin intact. No abnormal skin discoloration, erythema or ecchymosis.   No nail pitting or clubbing.  Normal wear pattern, color and tone.  No observable or palpable masses of the fingers or palm or wrist.  No palpable triggering of fingers but there is crepitus at the right ring finger A1 pulley.  Moderate tender to palpation right ring finger A1 pulley  No observable or palpable cords or nodules of the fingers or palm.    There is no swelling in the wrist, hand, forearm.  There is mild tenderness in the wrist, thumb carpometacarpal, a1 pulley of the right ring finger  There is no ecchymosis.  There is no erythema of the surrounding skin.  There is no maceration of the skin.  There is no gross deformity in the area.  Intact extensors. No extensor lag.    Special tests wrist:    Tinel's negative,    Phalen's positive.    Flexion/compression test positive.   Finkelstein's test: negative.      Special tests median nerve proximal forearm:    Tinel's positive.    1st carpometacarpal grind: positive     Intact sensation light touch median, radial, ulnar nerves of the hand  Intact sensation to the radial and ulnar digital nerves of the fingers, as well as the finger " tips.  Intact epl fpl fdp edc wrist flexion/extension biceps/triceps deltoid  Brisk capillary refill to all fingers.   Palpable radial pulse, 2+.      LEFT HAND/FINGERS:    Skin intact. No abnormal skin discoloration, erythema or ecchymosis.   No nail pitting or clubbing.  Normal wear pattern, color and tone.  No observable or palpable masses of the fingers or palm or wrist.  No palpable triggering of fingers.   No observable or palpable cords or nodules of the fingers or palm.    There is no swelling in the wrist, hand, forearm.  There is mild tenderness in the wrist, moderate tender to palpation carpometacarpal joint of the thumb  There is no ecchymosis.  There is no erythema of the surrounding skin.  There is no maceration of the skin.  There is no deformity in the area.  Intact extensors. No extensor lag.    Special tests wrist:    Tinel's negative,    Phalen's equivocal.    Flexion/compression test equivocal.   Finkelstein's test: negative.       Special tests median nerve proximal forearm:    Tinel's negative.    1st carpometacarpal grind: positive     Intact sensation light touch median, radial, ulnar nerves of the hand  Intact sensation to the radial and ulnar digital nerves of the fingers, as well as the finger tips.  Intact epl fpl fdp edc wrist flexion/extension biceps/triceps deltoid  Brisk capillary refill to all fingers.   Palpable radial pulse, 2+.      XRAYS:     Left wrist 5/30/2025 -- Marked degenerative arthritis first CMC joint. Joint spaces and alignment are otherwise normal. No acute fracture.     Right wrist 9/30/2024 -- No obvious fracture or dislocation. No obvious bony abnormality or lesion.         SPECIAL STUDIES:  EMG: none      ASSESSMENT/PLAN: 63yo RHD female with:  1) right carpal tunnel syndrome  2) right ring trigger finger  3) left thumb carpometacarpal osteoarthritis.      CARPAL TUNNEL SYNDROME  * discussed with patient signs and symptoms consistent with carpal tunnel syndrome.  Carpal tunnel syndrome is compression or pinching of the median nerve at the wrist, as it enters the hand. There are many different causes, and in most cases, multifactorial.    * An indepth discussion was had with her about the options for treatment, which included activity modification to avoid aggravating activities, taking breaks during activities that cause symptoms, stretching, NSAIDS to help decrease inflammation and swelling within the carpal tunnel, night splinting, corticosteroid injections, and carpal tunnel release.   * depending upon severity and duration of symptoms, nonoperative treatment is usually initiated, starting with least invasive modalities such as activity modification and a trial of night splints and NSAIDs.  * Cortisone injections are considered to decrease swelling and inflammation within the carpal tunnel and compression of the nerve.   * Lastly, carpal tunnel release should symptoms persist despite trial of nonoperative treatment, or in cases of severe carpal tunnel syndrome.  * risks of surgery, including, but not limited to: bleeding, infection, pain, scar, damage to adjacent structures (nerves, vessels, tendons), temporary versus permanent nerve injury, failure to relieve symptoms, recurrence of symptoms, incomplete release, stiffness, scar sensitivity and tenderness, need for further surgery, risks of anesthesia were discussed.  * in some cases, with severe, prolonged symptoms, or in situations of underlying peripheral neuropathy, or cervical radiculopahty, there may be permanent nerve changes not amenable to surgery, that even with surgery, may not resolve.  * in some cases, it may take 6 months to a year or longer for symptoms to improve or resolve, but even then may not completely resolve.  At this time, she'd like to proceed with right carpal tunnel release surgery.      RIGHT RING TRIGGER FINGER  We talked about the options: taping the PIP, splinting the PIP joint,  corticosteroid injections, hand therapy and surgery. I explained the risks and benefits of each, including recurrence. I have explained the nature of the surgical procedure, the risks and recovery time with the patient.  * At this time, patient would like to proceed with: surgical release         * plan: RIGHT open carpal tunnel release, right ring trigger finger release, outpatient procedure, MAC with local anesthesia.  * will schedule in future at a mutual convenience  * patient to obtain H+P prior to surgery  * return to clinic 2 weeks postoperative for wound check, suture removal, sooner if needed..  * all patient's questions addressed and answered today.     Will defer left thumb carpometacarpal osteoarthritis to Sports medicine.      * All questions were addressed and answered prior to discharge from clinic today. The patient acknowledges an understanding of and agreement with the plan set forth during today's visit. Patient was advised to call our office or MyChart us if any further questions arise upon leaving our office today.        Corey Ramirez M.D., M.S.  Dept. of Orthopaedic Surgery  [facility]

## 2025-07-16 ENCOUNTER — OFFICE VISIT (OUTPATIENT)
Dept: ORTHOPEDICS | Facility: CLINIC | Age: 65
End: 2025-07-16
Attending: FAMILY MEDICINE
Payer: COMMERCIAL

## 2025-07-16 VITALS — HEIGHT: 67 IN | WEIGHT: 195 LBS | BODY MASS INDEX: 30.61 KG/M2

## 2025-07-16 DIAGNOSIS — M65.341 TRIGGER FINGER, RIGHT RING FINGER: ICD-10-CM

## 2025-07-16 DIAGNOSIS — G56.01 RIGHT CARPAL TUNNEL SYNDROME: Primary | ICD-10-CM

## 2025-07-16 DIAGNOSIS — M18.12 PRIMARY OSTEOARTHRITIS OF FIRST CARPOMETACARPAL JOINT OF LEFT HAND: ICD-10-CM

## 2025-07-16 PROCEDURE — 99243 OFF/OP CNSLTJ NEW/EST LOW 30: CPT | Performed by: ORTHOPAEDIC SURGERY

## 2025-07-16 NOTE — LETTER
7/16/2025      Charlotte Arredondo  [address] [place] [address]      Dear Colleague,    Thank you for referring your patient, Charlotte Arredondo, to the [facility]. Please see a copy of my visit note below.    CHIEF COMPLAINT:   Chief Complaint   Patient presents with     Right Wrist - Pain, Numbness     Right Ring Finger - Pain     Left Thumb - Pain       Charlotte Arredondo is seen today in the [facility] for evaluation of right carpal tunnel syndrome  at the request of Dr. Harley Peace       HISTORY:  Charlotte Arredondo is a 64 year old female , Right -hand dominant who is seen in for Right hand numbness and tingling, pain, and weakness greater than 1  years.  she has numbness and tingling in 1,2 digits. The thumb might be always a little numb. Other symptoms include cramping in thenar eminence, significant, and waking up at night.  She is also wanting to consult for her right ring finger, trigger finger. She is also wondering about her left thumb, CMC as well.      Symptoms are worse with sleeping at night time, using, trimming, grabbing, pulling, excessive use.  Better with corticosteroid, but briefly.      Treatment has been   a splint, NSAIDs, and injections, resting.  Right carpal tunnel Injection 5/30/2025. Has helped some, but not completely, the thumb tip is still numb.    Left thumb carpometacarpal injection 5/30/2025    Also right ring trigger finger the past few months, will get stuck and have to move it back. Has been using an oval-8 finger splint which is helping.    Denies numbness and tingling left hand.    Has some neck pain, recent trigger point injections.      Suspected cause: Due to unknown factors.    Pain severity: 3/10  Pain quality: dull, aching, and burning  Frequency of symptoms: are constant.  Aggravating Factors: activities, use, night.  Relieving Factors: wrist brace, NSAIDS. Shaking hands.  Previous modalities  tried: a splint, NSAIDs, and injections   Usual level of work activity: retired       Other PMH:  has a past medical history of Basal cell carcinoma, Cervical high risk HPV (human papillomavirus) test positive (02/18/2020), Neoplasm of uncertain behavior of skin (03/13/2012), and Tobacco use disorder (11/30/2005).  Patient Active Problem List   Diagnosis     Personal history of tobacco use     Chronic low back pain     NICOLETTE (obstructive sleep apnea)-Mild (AHI 6)     CARDIOVASCULAR SCREENING; LDL GOAL LESS THAN 130     Major depression in complete remission (H)     BMI 38.0-38.9,adult     Anxiety     Mixed stress and urge urinary incontinence     Controlled substance agreement signed     Cervical high risk HPV (human papillomavirus) test positive     Stenosis of cervix uteri     Major depression in complete remission     Uncomplicated opioid dependence (H)     Gastroesophageal reflux disease with esophagitis, unspecified whether hemorrhage     Urinary, incontinence, stress female     F11.9 - Chronic, continuous use of opioids     Bilateral hearing loss, unspecified hearing loss type     Primary osteoarthritis of right knee       Surgical Hx:  has a past surgical history that includes tonsillectomy & adenoidectomy (as child); surgical history of -  (1973); surgical history of -  (2004); ENT surgery; surgical history of -  (10/15/2010); surgical history of -  (02/09/2006); Laparoscopic cholecystectomy (10/21/2010); Colonoscopy (10/22/2012); and Conization leep (1983).    Medications:   Current Outpatient Medications:      acetaminophen (TYLENOL) 650 MG CR tablet, Take 1,300 mg by mouth every 8 hours as needed , Disp: , Rfl:      acyclovir (ZOVIRAX) 5 % external cream, Apply  topically. Apply to affected area thinly five times daily at the first sign of symptoms., Disp: , Rfl:      CALCIUM + D OR, 1 TABLET DAILY, Disp: , Rfl:      FLUoxetine (PROZAC) 20 MG capsule, Take 1 capsule (20 mg) by mouth daily., Disp: 90  capsule, Rfl: 4     HYDROcodone-acetaminophen (NORCO)  MG per tablet, TAKE ONE TABLET BY MOUTH EVERY 6 HOURS AS NEEDED FOR SEVERE PAIN, Disp: 30 tablet, Rfl: 0     ibuprofen 200 MG capsule, Take 200 mg by mouth every 4 hours as needed for fever, Disp: , Rfl:      LORazepam (ATIVAN) 0.5 MG tablet, Take 1 tablet (0.5 mg) by mouth every 6 hours as needed for anxiety, Disp: 20 tablet, Rfl: 0     Magnesium 400 MG CAPS, Take by mouth., Disp: , Rfl:      meloxicam (MOBIC) 7.5 MG tablet, Take 1 tablet (7.5 mg) by mouth daily., Disp: 90 tablet, Rfl: 4     methocarbamol (ROBAXIN) 750 MG tablet, TAKE ONE TO ONE AND ONE-HALF TABLETS BY MOUTH THREE TIMES DAILY AS NEEDED FOR MUSCLE SPASMS, Disp: 45 tablet, Rfl: 1     MULTIVITAMINS OR, 1 daily , Disp: , Rfl:      omeprazole (PRILOSEC) 20 MG DR capsule, Take 1 capsule (20 mg) by mouth daily., Disp: 90 capsule, Rfl: 4     ondansetron (ZOFRAN ODT) 4 MG ODT tab, Take 1 tablet (4 mg) by mouth every 8 hours as needed for nausea, Disp: 20 tablet, Rfl: 0     Semaglutide-Weight Management (WEGOVY) 2.4 MG/0.75ML pen, Inject 2.4 mg subcutaneously once a week., Disp: 3 mL, Rfl: 3     tirzepatide-Weight Management (ZEPBOUND) 15 MG/0.5ML prefilled pen, Inject 0.5 mLs (15 mg) subcutaneously every 7 days., Disp: 6 mL, Rfl: 1     tiZANidine (ZANAFLEX) 2 MG tablet, Take 1 tablet (2 mg) by mouth 3 times daily., Disp: 90 tablet, Rfl: 1     vibegron (GEMTESA) 75 MG TABS tablet, Take 1 tablet (75 mg) by mouth daily., Disp: 90 tablet, Rfl: 4     vibegron (GEMTESA) 75 MG TABS tablet, Take 1 tablet (75 mg) by mouth daily., Disp: , Rfl:      Vitamin D3 (CHOLECALCIFEROL) 125 MCG (5000 UT) tablet, 6000 UNITs, Disp: , Rfl:     Allergies:   Allergies   Allergen Reactions     Nkda [No Known Drug Allergy]        Social Hx: retired.  reports that she quit smoking about 9 years ago. Her smoking use included cigarettes. She started smoking about 29 years ago. She has a 10 pack-year smoking history. She has  "never used smokeless tobacco. She reports that she does not drink alcohol and does not use drugs.    Family Hx: family history includes C.A.D. in her father; Cancer - colorectal in her paternal grandfather; Diabetes in her father and sister; Heart Disease in her father..    REVIEW OF SYSTEMS: 10 point ROS neg other than the symptoms noted above in the HPI and PMH. Notables include  CONSTITUTIONAL:NEGATIVE for fever, chills, change in weight  INTEGUMENTARY/SKIN: NEGATIVE for worrisome rashes, moles or lesions  MUSCULOSKELETAL:See HPI above  Neurology: see HPI above.      EXAM:  Ht 1.702 m (5' 7\")   Wt 88.5 kg (195 lb)   LMP 03/06/2011   BMI 30.54 kg/m    GENERAL APPEARANCE: healthy, alert and no distress   GAIT: NORMAL  SKIN: no suspicious lesions or rashes  RESPIRATORY: No increased work of breathing.  NEURO:     strength: normal,     Thenar atrophy:Mild.    Sensation intact in  all digits,    reflexes normal in upper extremities.   PSYCH:  mentation appears normal and affect normal, not anxious.    MUSCULOSKELETAL:    RIGHT HAND/FINGERS:    Skin intact. No abnormal skin discoloration, erythema or ecchymosis.   No nail pitting or clubbing.  Normal wear pattern, color and tone.  No observable or palpable masses of the fingers or palm or wrist.  No palpable triggering of fingers but there is crepitus at the right ring finger A1 pulley.  Moderate tender to palpation right ring finger A1 pulley  No observable or palpable cords or nodules of the fingers or palm.    There is no swelling in the wrist, hand, forearm.  There is mild tenderness in the wrist, thumb carpometacarpal, a1 pulley of the right ring finger  There is no ecchymosis.  There is no erythema of the surrounding skin.  There is no maceration of the skin.  There is no gross deformity in the area.  Intact extensors. No extensor lag.    Special tests wrist:    Tinel's negative,    Phalen's positive.    Flexion/compression test positive.   Finkelstein's " test: negative.      Special tests median nerve proximal forearm:    Tinel's positive.    1st carpometacarpal grind: positive     Intact sensation light touch median, radial, ulnar nerves of the hand  Intact sensation to the radial and ulnar digital nerves of the fingers, as well as the finger tips.  Intact epl fpl fdp edc wrist flexion/extension biceps/triceps deltoid  Brisk capillary refill to all fingers.   Palpable radial pulse, 2+.      LEFT HAND/FINGERS:    Skin intact. No abnormal skin discoloration, erythema or ecchymosis.   No nail pitting or clubbing.  Normal wear pattern, color and tone.  No observable or palpable masses of the fingers or palm or wrist.  No palpable triggering of fingers.   No observable or palpable cords or nodules of the fingers or palm.    There is no swelling in the wrist, hand, forearm.  There is mild tenderness in the wrist, moderate tender to palpation carpometacarpal joint of the thumb  There is no ecchymosis.  There is no erythema of the surrounding skin.  There is no maceration of the skin.  There is no deformity in the area.  Intact extensors. No extensor lag.    Special tests wrist:    Tinel's negative,    Phalen's equivocal.    Flexion/compression test equivocal.   Finkelstein's test: negative.       Special tests median nerve proximal forearm:    Tinel's negative.    1st carpometacarpal grind: positive     Intact sensation light touch median, radial, ulnar nerves of the hand  Intact sensation to the radial and ulnar digital nerves of the fingers, as well as the finger tips.  Intact epl fpl fdp edc wrist flexion/extension biceps/triceps deltoid  Brisk capillary refill to all fingers.   Palpable radial pulse, 2+.      XRAYS:     Left wrist 5/30/2025 -- Marked degenerative arthritis first CMC joint. Joint spaces and alignment are otherwise normal. No acute fracture.     Right wrist 9/30/2024 -- No obvious fracture or dislocation. No obvious bony abnormality or lesion.          SPECIAL STUDIES:  EMG: none      ASSESSMENT/PLAN: 63yo RHD female with:  1) right carpal tunnel syndrome  2) right ring trigger finger  3) left thumb carpometacarpal osteoarthritis.      CARPAL TUNNEL SYNDROME  * discussed with patient signs and symptoms consistent with carpal tunnel syndrome. Carpal tunnel syndrome is compression or pinching of the median nerve at the wrist, as it enters the hand. There are many different causes, and in most cases, multifactorial.    * An indepth discussion was had with her about the options for treatment, which included activity modification to avoid aggravating activities, taking breaks during activities that cause symptoms, stretching, NSAIDS to help decrease inflammation and swelling within the carpal tunnel, night splinting, corticosteroid injections, and carpal tunnel release.   * depending upon severity and duration of symptoms, nonoperative treatment is usually initiated, starting with least invasive modalities such as activity modification and a trial of night splints and NSAIDs.  * Cortisone injections are considered to decrease swelling and inflammation within the carpal tunnel and compression of the nerve.   * Lastly, carpal tunnel release should symptoms persist despite trial of nonoperative treatment, or in cases of severe carpal tunnel syndrome.  * risks of surgery, including, but not limited to: bleeding, infection, pain, scar, damage to adjacent structures (nerves, vessels, tendons), temporary versus permanent nerve injury, failure to relieve symptoms, recurrence of symptoms, incomplete release, stiffness, scar sensitivity and tenderness, need for further surgery, risks of anesthesia were discussed.  * in some cases, with severe, prolonged symptoms, or in situations of underlying peripheral neuropathy, or cervical radiculopahty, there may be permanent nerve changes not amenable to surgery, that even with surgery, may not resolve.  * in some cases, it may  take 6 months to a year or longer for symptoms to improve or resolve, but even then may not completely resolve.  At this time, she'd like to proceed with right carpal tunnel release surgery.      RIGHT RING TRIGGER FINGER  We talked about the options: taping the PIP, splinting the PIP joint, corticosteroid injections, hand therapy and surgery. I explained the risks and benefits of each, including recurrence. I have explained the nature of the surgical procedure, the risks and recovery time with the patient.  * At this time, patient would like to proceed with: surgical release         * plan: RIGHT open carpal tunnel release, right ring trigger finger release, outpatient procedure, MAC with local anesthesia.  * will schedule in future at a mutual convenience  * patient to obtain H+P prior to surgery  * return to clinic 2 weeks postoperative for wound check, suture removal, sooner if needed..  * all patient's questions addressed and answered today.     Will defer left thumb carpometacarpal osteoarthritis to Sports medicine.      * All questions were addressed and answered prior to discharge from clinic today. The patient acknowledges an understanding of and agreement with the plan set forth during today's visit. Patient was advised to call our office or MyChart us if any further questions arise upon leaving our office today.        Corey Ramirez M.D., M.S.  Dept. of Orthopaedic Surgery  [facility]      Again, thank you for allowing me to participate in the care of your patient.        Sincerely,        Corey Ramirez MD    Electronically signed

## 2025-08-06 DIAGNOSIS — F11.90 CHRONIC, CONTINUOUS USE OF OPIOIDS: ICD-10-CM

## 2025-08-06 DIAGNOSIS — M54.50 CHRONIC LOW BACK PAIN WITHOUT SCIATICA, UNSPECIFIED BACK PAIN LATERALITY: ICD-10-CM

## 2025-08-06 DIAGNOSIS — G89.29 CHRONIC LOW BACK PAIN WITHOUT SCIATICA, UNSPECIFIED BACK PAIN LATERALITY: ICD-10-CM

## 2025-08-06 RX ORDER — HYDROCODONE BITARTRATE AND ACETAMINOPHEN 10; 325 MG/1; MG/1
1 TABLET ORAL EVERY 6 HOURS PRN
Qty: 30 TABLET | Refills: 0 | Status: SHIPPED | OUTPATIENT
Start: 2025-08-06

## 2025-09-03 ENCOUNTER — OFFICE VISIT (OUTPATIENT)
Dept: FAMILY MEDICINE | Facility: CLINIC | Age: 65
End: 2025-09-03
Payer: COMMERCIAL

## 2025-09-03 VITALS
HEART RATE: 72 BPM | SYSTOLIC BLOOD PRESSURE: 104 MMHG | TEMPERATURE: 97.9 F | HEIGHT: 67 IN | BODY MASS INDEX: 31.55 KG/M2 | WEIGHT: 201 LBS | OXYGEN SATURATION: 100 % | DIASTOLIC BLOOD PRESSURE: 76 MMHG | RESPIRATION RATE: 16 BRPM

## 2025-09-03 DIAGNOSIS — Z01.818 PREOP GENERAL PHYSICAL EXAM: Primary | ICD-10-CM

## 2025-09-03 DIAGNOSIS — Z12.31 VISIT FOR SCREENING MAMMOGRAM: ICD-10-CM

## 2025-09-03 DIAGNOSIS — M65.30 TRIGGER FINGER, ACQUIRED: ICD-10-CM

## 2025-09-03 DIAGNOSIS — L30.9 DERMATITIS: ICD-10-CM

## 2025-09-03 DIAGNOSIS — G47.33 OSA (OBSTRUCTIVE SLEEP APNEA): ICD-10-CM

## 2025-09-03 PROBLEM — F11.20 UNCOMPLICATED OPIOID DEPENDENCE (H): Status: RESOLVED | Noted: 2022-04-28 | Resolved: 2025-09-03

## 2025-09-03 PROCEDURE — 99214 OFFICE O/P EST MOD 30 MIN: CPT | Performed by: FAMILY MEDICINE

## 2025-09-03 RX ORDER — MUPIROCIN 2 %
OINTMENT (GRAM) TOPICAL 2 TIMES DAILY
Qty: 15 G | Refills: 0 | Status: SHIPPED | OUTPATIENT
Start: 2025-09-03

## 2025-09-03 RX ORDER — TRIAMCINOLONE ACETONIDE 1 MG/G
OINTMENT TOPICAL 2 TIMES DAILY
Qty: 15 G | Refills: 0 | Status: SHIPPED | OUTPATIENT
Start: 2025-09-03

## 2025-09-03 ASSESSMENT — PATIENT HEALTH QUESTIONNAIRE - PHQ9
10. IF YOU CHECKED OFF ANY PROBLEMS, HOW DIFFICULT HAVE THESE PROBLEMS MADE IT FOR YOU TO DO YOUR WORK, TAKE CARE OF THINGS AT HOME, OR GET ALONG WITH OTHER PEOPLE: NOT DIFFICULT AT ALL
SUM OF ALL RESPONSES TO PHQ QUESTIONS 1-9: 4
SUM OF ALL RESPONSES TO PHQ QUESTIONS 1-9: 4

## 2025-09-03 ASSESSMENT — PAIN SCALES - GENERAL: PAINLEVEL_OUTOF10: NO PAIN (0)

## 2025-09-04 ENCOUNTER — PATIENT OUTREACH (OUTPATIENT)
Dept: CARE COORDINATION | Facility: CLINIC | Age: 65
End: 2025-09-04
Payer: COMMERCIAL